# Patient Record
Sex: MALE | Race: WHITE | NOT HISPANIC OR LATINO | Employment: OTHER | URBAN - NONMETROPOLITAN AREA
[De-identification: names, ages, dates, MRNs, and addresses within clinical notes are randomized per-mention and may not be internally consistent; named-entity substitution may affect disease eponyms.]

---

## 2017-01-03 ENCOUNTER — DOCUMENTATION (OUTPATIENT)
Dept: GASTROENTEROLOGY | Facility: CLINIC | Age: 52
End: 2017-01-03

## 2017-01-03 NOTE — PROGRESS NOTES
01/03/2017, 1136 - Prior Authorization request received via facsimile per patient's pharmacy of choice, [place], [place], regarding Humira Pen Injector Kit 40 mg/0.8 ml, Inject one 40 mg pen subcutaneously every 14 days.  Patient's health insurance, [facility], contacted [phone].  Spoke with Representative, Veena.  Prior Authorization obtained with ending date of 08/01/2018.  Reference Number [id number].  Pharmacy notified.  Spoke with PharmacistEllie.

## 2017-02-16 ENCOUNTER — TELEPHONE (OUTPATIENT)
Dept: GASTROENTEROLOGY | Facility: CLINIC | Age: 52
End: 2017-02-16

## 2017-02-16 NOTE — TELEPHONE ENCOUNTER
02/16/2017, 1551 - Patient telephoned per this staff member with notification of need to complete [employer] Patient Assistance Application for Humira.  Paper application received via facsimile submitted to patient via mail at current address on file, [address] [place] [id number], with physician/clinical portion of application completed.  Patient states he had submitted application online Monday, February 13, 2017.

## 2017-02-20 ENCOUNTER — DOCUMENTATION (OUTPATIENT)
Dept: GASTROENTEROLOGY | Facility: CLINIC | Age: 52
End: 2017-02-20

## 2017-02-20 NOTE — PROGRESS NOTES
02/20/2017, Josef Veras with [employer] [facility] [employer] ([phone], extension [phone]) telephoned requesting a verbal order for patient, Jose Trent, 1965, prescription medication therapy regarding patient's diagnosis of Crohn's Disease with Complications Unspecified Gastrointestinal Tract Location (K50.919).  Spoke with Pharmacist, Romelia.  Verbal Order - Humira 40 mg / 0.8 ml, Inject one pen subcutaneously every 14 days, Dispense 3 kits (6 pens - 3 month supply), 3 refills.  Eda and Romelia notified that this staff member spoke with patient 02/16/2017 regarding need to complete assistance application received via facsimile.  Physician/clinical portion of assistance application completed per this staff member and submitted to patient via mail at current address on file - [address] [place] [address], [phone], with patient verbalizing understanding, and stating he had previously submitted assistance application on line Monday, February 13, 2017.

## 2017-02-22 ENCOUNTER — TELEPHONE (OUTPATIENT)
Dept: GASTROENTEROLOGY | Facility: CLINIC | Age: 52
End: 2017-02-22

## 2017-02-22 NOTE — TELEPHONE ENCOUNTER
02/22/2017, 1551 - Patient telephoned  [phone] with notification of approval of patient assistance application through 12/31/2017 per [employer] regarding Humira.  Patient verbalized understanding.

## 2017-03-08 ENCOUNTER — LAB (OUTPATIENT)
Dept: LAB | Facility: HOSPITAL | Age: 52
End: 2017-03-08

## 2017-03-08 ENCOUNTER — OFFICE VISIT (OUTPATIENT)
Dept: GASTROENTEROLOGY | Facility: CLINIC | Age: 52
End: 2017-03-08

## 2017-03-08 VITALS
HEART RATE: 71 BPM | DIASTOLIC BLOOD PRESSURE: 86 MMHG | BODY MASS INDEX: 26.81 KG/M2 | SYSTOLIC BLOOD PRESSURE: 135 MMHG | HEIGHT: 70 IN | WEIGHT: 187.3 LBS

## 2017-03-08 DIAGNOSIS — K52.9 IBD (INFLAMMATORY BOWEL DISEASE): Primary | ICD-10-CM

## 2017-03-08 DIAGNOSIS — E53.8 VITAMIN B12 DEFICIENCY: ICD-10-CM

## 2017-03-08 DIAGNOSIS — K50.819 CROHN'S DISEASE OF SMALL AND LARGE INTESTINES WITH COMPLICATION (HCC): ICD-10-CM

## 2017-03-08 DIAGNOSIS — K21.9 GASTROESOPHAGEAL REFLUX DISEASE WITHOUT ESOPHAGITIS: ICD-10-CM

## 2017-03-08 LAB
BASOPHILS # BLD AUTO: 0.05 10*3/MM3 (ref 0–0.2)
BASOPHILS NFR BLD AUTO: 0.5 % (ref 0–2)
DEPRECATED RDW RBC AUTO: 49.5 FL (ref 35.1–43.9)
EOSINOPHIL # BLD AUTO: 0.52 10*3/MM3 (ref 0–0.7)
EOSINOPHIL NFR BLD AUTO: 5.4 % (ref 0–7)
ERYTHROCYTE [DISTWIDTH] IN BLOOD BY AUTOMATED COUNT: 14.5 % (ref 11.5–14.5)
HCT VFR BLD AUTO: 43.1 % (ref 39–49)
HGB BLD-MCNC: 15.3 G/DL (ref 13.7–17.3)
IMM GRANULOCYTES # BLD: 0.01 10*3/MM3 (ref 0–0.02)
IMM GRANULOCYTES NFR BLD: 0.1 % (ref 0–0.5)
LYMPHOCYTES # BLD AUTO: 4.13 10*3/MM3 (ref 0.6–4.2)
LYMPHOCYTES NFR BLD AUTO: 42.6 % (ref 10–50)
MCH RBC QN AUTO: 33 PG (ref 26.5–34)
MCHC RBC AUTO-ENTMCNC: 35.5 G/DL (ref 31.5–36.3)
MCV RBC AUTO: 93.1 FL (ref 80–98)
MONOCYTES # BLD AUTO: 0.84 10*3/MM3 (ref 0–0.9)
MONOCYTES NFR BLD AUTO: 8.7 % (ref 0–12)
NEUTROPHILS # BLD AUTO: 4.14 10*3/MM3 (ref 2–8.6)
NEUTROPHILS NFR BLD AUTO: 42.7 % (ref 37–80)
PLATELET # BLD AUTO: 391 10*3/MM3 (ref 150–450)
PMV BLD AUTO: 10.4 FL (ref 8–12)
RBC # BLD AUTO: 4.63 10*6/MM3 (ref 4.37–5.74)
VIT B12 BLD-MCNC: 250 PG/ML (ref 239–931)
WBC NRBC COR # BLD: 9.69 10*3/MM3 (ref 3.2–9.8)

## 2017-03-08 PROCEDURE — 36415 COLL VENOUS BLD VENIPUNCTURE: CPT

## 2017-03-08 PROCEDURE — 82607 VITAMIN B-12: CPT

## 2017-03-08 PROCEDURE — 99213 OFFICE O/P EST LOW 20 MIN: CPT | Performed by: INTERNAL MEDICINE

## 2017-03-08 PROCEDURE — 85025 COMPLETE CBC W/AUTO DIFF WBC: CPT

## 2017-03-08 RX ORDER — PREDNISONE 1 MG/1
TABLET ORAL
Refills: 1 | COMMUNITY
Start: 2017-01-26 | End: 2017-06-21

## 2017-03-11 NOTE — PROGRESS NOTES
Chief Complaint   Patient presents with   • Crohn's Disease        Jose Trent is a  51 y.o. male here for a follow up visit for Crohn's disease    HPI :  The patient returns for follow-up.  Currently doing well on Humira.  Bowel movements are 5-6 times daily.  Rare nocturnal diarrhea.  Denies rectal bleeding.  Weight is stable  .  Reflux symptoms are controlled with omeprazole 20 mg 1-2 daily.  He continues to smoke in spite of advice to discontinue this.  Lab work is reviewed and appears satisfactory         Past Medical History   Diagnosis Date   • Backache      of indeterminate etiology   • Cobalamin deficiency    • Crohn's disease      With ileocolic stricture now resected   • Crohn's disease, small intestine    • Drug therapy      long term   • GERD (gastroesophageal reflux disease)    • Partial obstruction of small intestine    • Pharyngitis    • Portal hypertension    • Portal vein thrombosis      And splenic vein   • Rhinitis    • Tobacco dependence syndrome      encouraged to stop   • Upper respiratory infection        Current Outpatient Prescriptions   Medication Sig Dispense Refill   • adalimumab (HUMIRA) 40 MG/0.8ML Prefilled Syringe Kit injection Inject 40 mg under the skin every 14 (fourteen) days.     • aspirin 81 MG EC tablet Take 81 mg by mouth daily.     • omeprazole (PriLOSEC) 20 MG capsule Take 20 mg by mouth 2 (two) times a day.     • predniSONE (DELTASONE) 5 MG tablet   1     Current Facility-Administered Medications   Medication Dose Route Frequency Provider Last Rate Last Dose   • cyanocobalamin injection 500 mcg  500 mcg Intramuscular Q28 Days VEENA Starkey   500 mcg at 12/07/16 1046       PRN Meds:.    No Known Allergies    Social History     Social History   • Marital status:      Spouse name: N/A   • Number of children: N/A   • Years of education: N/A     Occupational History   • Not on file.     Social History Main Topics   • Smoking status: Current Every Day  Smoker     Packs/day: 1.00     Types: Cigarettes   • Smokeless tobacco: Never Used   • Alcohol use No   • Drug use: No   • Sexual activity: Defer     Other Topics Concern   • Not on file     Social History Narrative       Family History   Problem Relation Age of Onset   • Cancer Other        Review of Systems   Constitutional: Negative for activity change, chills, diaphoresis and unexpected weight change.   Cardiovascular: Negative for chest pain.   Gastrointestinal: Positive for diarrhea. Negative for abdominal distention, abdominal pain, anal bleeding, blood in stool, constipation, nausea, rectal pain and vomiting.   Musculoskeletal: Negative for arthralgias and myalgias.       Vitals:    03/08/17 1059   BP: 135/86   Pulse: 71       Physical Exam   Constitutional: He appears well-developed and well-nourished.   Cardiovascular: Normal rate, regular rhythm and normal heart sounds.  Exam reveals no gallop and no friction rub.    No murmur heard.  Pulmonary/Chest: Effort normal and breath sounds normal. No respiratory distress. He has no rales.   Abdominal: Soft. Normal appearance and bowel sounds are normal. He exhibits no distension and no ascites. There is no hepatosplenomegaly, splenomegaly or hepatomegaly. There is no tenderness. No hernia.   Nursing note and vitals reviewed.      ASSESSMENT AND PLAN      ICD-10-CM ICD-9-CM   1. IBD (inflammatory bowel disease) K63.89 558.9   2. Crohn's disease of small and large intestines with complication K50.819 555.2   3. Gastroesophageal reflux disease without esophagitis K21.9 530.81       Plan:  Continue current medical regimen  Return to clinic in 3 months with CBC chem 13 at that time          This document has been electronically signed by Ross Mejias MD on March 11, 2017 3:38 PM

## 2017-04-05 ENCOUNTER — DOCUMENTATION (OUTPATIENT)
Dept: GASTROENTEROLOGY | Facility: CLINIC | Age: 52
End: 2017-04-05

## 2017-04-05 DIAGNOSIS — K21.9 GASTROESOPHAGEAL REFLUX DISEASE WITHOUT ESOPHAGITIS: Primary | ICD-10-CM

## 2017-04-05 RX ORDER — OMEPRAZOLE 20 MG/1
CAPSULE, DELAYED RELEASE ORAL
Qty: 60 CAPSULE | Refills: 5 | Status: SHIPPED | OUTPATIENT
Start: 2017-04-05 | End: 2018-03-26 | Stop reason: SDUPTHER

## 2017-06-21 ENCOUNTER — LAB (OUTPATIENT)
Dept: LAB | Facility: HOSPITAL | Age: 52
End: 2017-06-21

## 2017-06-21 ENCOUNTER — OFFICE VISIT (OUTPATIENT)
Dept: GASTROENTEROLOGY | Facility: CLINIC | Age: 52
End: 2017-06-21

## 2017-06-21 VITALS
HEIGHT: 70 IN | WEIGHT: 181.1 LBS | BODY MASS INDEX: 25.93 KG/M2 | DIASTOLIC BLOOD PRESSURE: 77 MMHG | HEART RATE: 74 BPM | SYSTOLIC BLOOD PRESSURE: 116 MMHG

## 2017-06-21 DIAGNOSIS — K52.9 IBD (INFLAMMATORY BOWEL DISEASE): ICD-10-CM

## 2017-06-21 DIAGNOSIS — E53.8 VITAMIN B12 DEFICIENCY: ICD-10-CM

## 2017-06-21 DIAGNOSIS — K21.9 GASTROESOPHAGEAL REFLUX DISEASE WITHOUT ESOPHAGITIS: ICD-10-CM

## 2017-06-21 DIAGNOSIS — K50.019 CROHN'S DISEASE OF SMALL INTESTINE WITH COMPLICATION (HCC): Primary | ICD-10-CM

## 2017-06-21 LAB
ALBUMIN SERPL-MCNC: 4.1 G/DL (ref 3.4–4.8)
ALBUMIN/GLOB SERPL: 1.2 G/DL (ref 1.1–1.8)
ALP SERPL-CCNC: 87 U/L (ref 38–126)
ALT SERPL W P-5'-P-CCNC: 37 U/L (ref 21–72)
ANION GAP SERPL CALCULATED.3IONS-SCNC: 10 MMOL/L (ref 5–15)
AST SERPL-CCNC: 56 U/L (ref 17–59)
BASOPHILS # BLD AUTO: 0.03 10*3/MM3 (ref 0–0.2)
BASOPHILS NFR BLD AUTO: 0.3 % (ref 0–2)
BILIRUB SERPL-MCNC: 0.9 MG/DL (ref 0.2–1.3)
BUN BLD-MCNC: 9 MG/DL (ref 7–21)
BUN/CREAT SERPL: 9.3 (ref 7–25)
CALCIUM SPEC-SCNC: 9.1 MG/DL (ref 8.4–10.2)
CHLORIDE SERPL-SCNC: 103 MMOL/L (ref 95–110)
CO2 SERPL-SCNC: 27 MMOL/L (ref 22–31)
CREAT BLD-MCNC: 0.97 MG/DL (ref 0.7–1.3)
DEPRECATED RDW RBC AUTO: 49.2 FL (ref 35.1–43.9)
EOSINOPHIL # BLD AUTO: 0.45 10*3/MM3 (ref 0–0.7)
EOSINOPHIL NFR BLD AUTO: 4.8 % (ref 0–7)
ERYTHROCYTE [DISTWIDTH] IN BLOOD BY AUTOMATED COUNT: 14.6 % (ref 11.5–14.5)
GFR SERPL CREATININE-BSD FRML MDRD: 82 ML/MIN/1.73 (ref 56–130)
GLOBULIN UR ELPH-MCNC: 3.5 GM/DL (ref 2.3–3.5)
GLUCOSE BLD-MCNC: 106 MG/DL (ref 60–100)
HCT VFR BLD AUTO: 43 % (ref 39–49)
HGB BLD-MCNC: 15 G/DL (ref 13.7–17.3)
IMM GRANULOCYTES # BLD: 0.01 10*3/MM3 (ref 0–0.02)
IMM GRANULOCYTES NFR BLD: 0.1 % (ref 0–0.5)
LYMPHOCYTES # BLD AUTO: 3.33 10*3/MM3 (ref 0.6–4.2)
LYMPHOCYTES NFR BLD AUTO: 35.2 % (ref 10–50)
MCH RBC QN AUTO: 32.4 PG (ref 26.5–34)
MCHC RBC AUTO-ENTMCNC: 34.9 G/DL (ref 31.5–36.3)
MCV RBC AUTO: 92.9 FL (ref 80–98)
MONOCYTES # BLD AUTO: 1.01 10*3/MM3 (ref 0–0.9)
MONOCYTES NFR BLD AUTO: 10.7 % (ref 0–12)
NEUTROPHILS # BLD AUTO: 4.64 10*3/MM3 (ref 2–8.6)
NEUTROPHILS NFR BLD AUTO: 48.9 % (ref 37–80)
PLATELET # BLD AUTO: 401 10*3/MM3 (ref 150–450)
PMV BLD AUTO: 11.5 FL (ref 8–12)
POTASSIUM BLD-SCNC: 3.5 MMOL/L (ref 3.5–5.1)
PROT SERPL-MCNC: 7.6 G/DL (ref 6.3–8.6)
RBC # BLD AUTO: 4.63 10*6/MM3 (ref 4.37–5.74)
SODIUM BLD-SCNC: 140 MMOL/L (ref 137–145)
WBC NRBC COR # BLD: 9.47 10*3/MM3 (ref 3.2–9.8)

## 2017-06-21 PROCEDURE — 96372 THER/PROPH/DIAG INJ SC/IM: CPT | Performed by: INTERNAL MEDICINE

## 2017-06-21 PROCEDURE — 36415 COLL VENOUS BLD VENIPUNCTURE: CPT

## 2017-06-21 PROCEDURE — 99212 OFFICE O/P EST SF 10 MIN: CPT | Performed by: INTERNAL MEDICINE

## 2017-06-21 PROCEDURE — 80053 COMPREHEN METABOLIC PANEL: CPT | Performed by: INTERNAL MEDICINE

## 2017-06-21 PROCEDURE — 85025 COMPLETE CBC W/AUTO DIFF WBC: CPT | Performed by: INTERNAL MEDICINE

## 2017-06-21 RX ADMIN — CYANOCOBALAMIN 500 MCG: 1000 INJECTION, SOLUTION INTRAMUSCULAR; SUBCUTANEOUS at 09:35

## 2017-06-24 NOTE — PROGRESS NOTES
Chief Complaint   Patient presents with   • 3 Month Clinical Appointment   • Gastroesophageal Reflux Disease Without Esophagitis   • Irritable Bowel Disease   • Crohn's Disease Of Small And Large Intestine With Complicati        Jose Trent is a  51 y.o. male here for a follow up visit for Crohn's disease    HPI :  The patient returns for follow-up.  The patient was done rather well since his surgery in April of last year.  Bowel movements currently are 5-6 times daily.  Denies rectal bleeding.  Denies abdominal pain.    His weight is stable.  He continues to smoke in spite of advised to discontinue this.  He takes Prilosec 20 mg on a when necessary basis.  Presently denies heartburn.  No dysphagia.      Past Medical History:   Diagnosis Date   • Backache     of indeterminate etiology   • Cobalamin deficiency    • Crohn's disease     With ileocolic stricture now resected   • Crohn's disease, small intestine    • Drug therapy     long term   • GERD (gastroesophageal reflux disease)    • Partial obstruction of small intestine    • Pharyngitis    • Portal hypertension    • Portal vein thrombosis     And splenic vein   • Rhinitis    • Tobacco dependence syndrome     encouraged to stop   • Upper respiratory infection        Current Outpatient Prescriptions   Medication Sig Dispense Refill   • adalimumab (HUMIRA) 40 MG/0.8ML Prefilled Syringe Kit injection Inject 40 mg under the skin every 14 (fourteen) days.     • aspirin 81 MG EC tablet Take 81 mg by mouth daily.     • omeprazole (priLOSEC) 20 MG capsule 1 capsule by mouth 2 times per day 60 capsule 5     Current Facility-Administered Medications   Medication Dose Route Frequency Provider Last Rate Last Dose   • cyanocobalamin injection 500 mcg  500 mcg Intramuscular Q28 Days VEENA Starkey   500 mcg at 06/21/17 0935       PRN Meds:.    No Known Allergies    Social History     Social History   • Marital status:      Spouse name: N/A   • Number  of children: N/A   • Years of education: N/A     Occupational History   • Not on file.     Social History Main Topics   • Smoking status: Current Every Day Smoker     Packs/day: 1.00     Types: Cigarettes   • Smokeless tobacco: Never Used   • Alcohol use No   • Drug use: No   • Sexual activity: Defer      Comment:      Other Topics Concern   • Not on file     Social History Narrative       Family History   Problem Relation Age of Onset   • Cancer Other        Review of Systems   Constitutional: Negative for activity change, chills, diaphoresis and unexpected weight change.   Cardiovascular: Negative for chest pain.   Gastrointestinal: Positive for diarrhea. Negative for abdominal distention, abdominal pain, anal bleeding, blood in stool, constipation, nausea, rectal pain and vomiting.   Musculoskeletal: Negative for arthralgias and myalgias.       Vitals:    06/21/17 0851   BP: 116/77   Pulse: 74       Physical Exam    ASSESSMENT AND PLAN      ICD-10-CM ICD-9-CM   1. Crohn's disease of small intestine with complication K50.019 555.0   2. Vitamin B12 deficiency E53.8 266.2   3. Gastroesophageal reflux disease without esophagitis K21.9 530.81      Plan :  Vitamin B12 500 µg IM   Continue current medical regimen   Follow-up by gastroenterology in 6 months by Dr. Beavers          This document has been electronically signed by Ross Mejias MD on June 24, 2017 10:53 AM                                   “EMR Dragon/Transcription disclaimer:   Much of this encounter note is an electronic transcription/translation of spoken language to printed text. The electronic translation of spoken language may permit erroneous, or at times, nonsensical words or phrases to be inadvertently transcribed; Although I have reviewed the note for such errors, some may still exist.”

## 2017-09-18 DIAGNOSIS — E53.8 VITAMIN B12 DEFICIENCY: Primary | ICD-10-CM

## 2017-09-18 RX ORDER — CYANOCOBALAMIN 1000 UG/ML
500 INJECTION, SOLUTION INTRAMUSCULAR; SUBCUTANEOUS
Status: DISCONTINUED | OUTPATIENT
Start: 2017-09-18 | End: 2021-05-04

## 2017-12-19 ENCOUNTER — APPOINTMENT (OUTPATIENT)
Dept: LAB | Facility: HOSPITAL | Age: 52
End: 2017-12-19

## 2017-12-19 ENCOUNTER — OFFICE VISIT (OUTPATIENT)
Dept: GASTROENTEROLOGY | Facility: CLINIC | Age: 52
End: 2017-12-19

## 2017-12-19 VITALS
HEART RATE: 68 BPM | DIASTOLIC BLOOD PRESSURE: 72 MMHG | HEIGHT: 70 IN | SYSTOLIC BLOOD PRESSURE: 128 MMHG | WEIGHT: 186 LBS | BODY MASS INDEX: 26.63 KG/M2

## 2017-12-19 DIAGNOSIS — K50.819 CROHN'S DISEASE OF SMALL AND LARGE INTESTINES WITH COMPLICATION (HCC): Primary | ICD-10-CM

## 2017-12-19 LAB
ALBUMIN SERPL-MCNC: 4.3 G/DL (ref 3.4–4.8)
ALBUMIN/GLOB SERPL: 1.2 G/DL (ref 1.1–1.8)
ALP SERPL-CCNC: 70 U/L (ref 38–126)
ALT SERPL W P-5'-P-CCNC: 40 U/L (ref 21–72)
ANION GAP SERPL CALCULATED.3IONS-SCNC: 10 MMOL/L (ref 5–15)
AST SERPL-CCNC: 37 U/L (ref 17–59)
BASOPHILS # BLD AUTO: 0.05 10*3/MM3 (ref 0–0.2)
BASOPHILS NFR BLD AUTO: 0.5 % (ref 0–2)
BILIRUB SERPL-MCNC: 0.8 MG/DL (ref 0.2–1.3)
BUN BLD-MCNC: 7 MG/DL (ref 7–21)
BUN/CREAT SERPL: 7.3 (ref 7–25)
CALCIUM SPEC-SCNC: 9.1 MG/DL (ref 8.4–10.2)
CHLORIDE SERPL-SCNC: 102 MMOL/L (ref 95–110)
CO2 SERPL-SCNC: 26 MMOL/L (ref 22–31)
CREAT BLD-MCNC: 0.96 MG/DL (ref 0.7–1.3)
DEPRECATED RDW RBC AUTO: 49.2 FL (ref 35.1–43.9)
EOSINOPHIL # BLD AUTO: 0.48 10*3/MM3 (ref 0–0.7)
EOSINOPHIL NFR BLD AUTO: 4.7 % (ref 0–7)
ERYTHROCYTE [DISTWIDTH] IN BLOOD BY AUTOMATED COUNT: 14.6 % (ref 11.5–14.5)
GFR SERPL CREATININE-BSD FRML MDRD: 83 ML/MIN/1.73 (ref 56–130)
GLOBULIN UR ELPH-MCNC: 3.7 GM/DL (ref 2.3–3.5)
GLUCOSE BLD-MCNC: 98 MG/DL (ref 60–100)
HCT VFR BLD AUTO: 42.8 % (ref 39–49)
HGB BLD-MCNC: 14.9 G/DL (ref 13.7–17.3)
IMM GRANULOCYTES # BLD: 0.02 10*3/MM3 (ref 0–0.02)
IMM GRANULOCYTES NFR BLD: 0.2 % (ref 0–0.5)
LYMPHOCYTES # BLD AUTO: 3.76 10*3/MM3 (ref 0.6–4.2)
LYMPHOCYTES NFR BLD AUTO: 36.6 % (ref 10–50)
MCH RBC QN AUTO: 32 PG (ref 26.5–34)
MCHC RBC AUTO-ENTMCNC: 34.8 G/DL (ref 31.5–36.3)
MCV RBC AUTO: 92 FL (ref 80–98)
MONOCYTES # BLD AUTO: 1.13 10*3/MM3 (ref 0–0.9)
MONOCYTES NFR BLD AUTO: 11 % (ref 0–12)
NEUTROPHILS # BLD AUTO: 4.83 10*3/MM3 (ref 2–8.6)
NEUTROPHILS NFR BLD AUTO: 47 % (ref 37–80)
PLATELET # BLD AUTO: 373 10*3/MM3 (ref 150–450)
PMV BLD AUTO: 10.4 FL (ref 8–12)
POTASSIUM BLD-SCNC: 3.6 MMOL/L (ref 3.5–5.1)
PROT SERPL-MCNC: 8 G/DL (ref 6.3–8.6)
RBC # BLD AUTO: 4.65 10*6/MM3 (ref 4.37–5.74)
SODIUM BLD-SCNC: 138 MMOL/L (ref 137–145)
WBC NRBC COR # BLD: 10.27 10*3/MM3 (ref 3.2–9.8)

## 2017-12-19 PROCEDURE — 86480 TB TEST CELL IMMUN MEASURE: CPT | Performed by: NURSE PRACTITIONER

## 2017-12-19 PROCEDURE — 36415 COLL VENOUS BLD VENIPUNCTURE: CPT | Performed by: NURSE PRACTITIONER

## 2017-12-19 PROCEDURE — 99213 OFFICE O/P EST LOW 20 MIN: CPT | Performed by: NURSE PRACTITIONER

## 2017-12-19 PROCEDURE — 80053 COMPREHEN METABOLIC PANEL: CPT | Performed by: NURSE PRACTITIONER

## 2017-12-19 PROCEDURE — 85025 COMPLETE CBC W/AUTO DIFF WBC: CPT | Performed by: NURSE PRACTITIONER

## 2017-12-19 NOTE — PROGRESS NOTES
Chief Complaint   Patient presents with   • Crohn's Disease       Subjective    Jose Trent is a 52 y.o. male. he is here today for follow-up.    History of Present Illness  52-year-old male presents for follow-up regarding Crohn's disease.  He has been followed by Dr. Mejias since 1984.  He was diagnosed with Crohn's disease in 1982.  He has had multiple abdominal surgeries outlined below with most recent surgery 2016 he reports was due to stricturing due to scar tissue.  He has been on Humira for the last 2 years and states flares have been much less frequent with this.  His weight is improved today at 186 pounds from last visit in June of 181 pounds.  He reports a good appetite.  Currently on B12 injections per primary care provider in [place].  States his bowel movements are about the same 5-6 times per day described as loose with no melena or hematochezia.  His last colonoscopy 7/2/15  Also gastric esophageal reflux disease well controlled with omeprazole 20 mg per day.  He is smoker not interested in quitting at this time smoking cessation is recommended.  Plan; we'll recheck CBC and CMP, QuantiFERON Gold test.  Follow-up in 6 months and he'll return to office sooner if needed.       The following portions of the patient's history were reviewed and updated as appropriate:   Past Medical History:   Diagnosis Date   • Backache     of indeterminate etiology   • Cobalamin deficiency    • Crohn's disease     With ileocolic stricture now resected   • Crohn's disease, small intestine    • Drug therapy     long term   • GERD (gastroesophageal reflux disease)    • Partial obstruction of small intestine    • Pharyngitis    • Portal hypertension    • Portal vein thrombosis     And splenic vein   • Rhinitis    • Tobacco dependence syndrome     encouraged to stop   • Upper respiratory infection      Past Surgical History:   Procedure Laterality Date   • COLECTOMY PARTIAL / TOTAL      Resection of  segment of sigmoid colon, resection of 45 cm of ileum, right colon, with ileotransverse colon anastomosis; resection of segment of bladder, suprapubic cystostomy. 03/28/1984       • COLONOSCOPY  07/02/2015   • ENDOSCOPY      Prior resect of cecum & ascend colon found.[place] beyond strict has a normal appear.Stenosis of anastomosis, will not allow pass of scope.Linear ulcerat of anastomosis.Narrow strict & not long strict as reported by xray. Dilatation performed. 6/26/2014       • ENDOSCOPY      Normal hypopharynx, esophagus, dudoenum, symmetrical & patent pylorus. Hiatus hernia in GE junction. Mild non-erosive gastritis in antrum. Biopsy taken. 01/31/2013       • EXPLORATORY LAPAROTOMY      Exploratory laparotomy with adhesiolysis.Takedown of previous anastomosis.Resection of terminal ileum and proximal transverse colon w/a stapled CODY anastomosis between ileum and transverse colon. 03/07/2016       • SPLENECTOMY       Splenomegaly and hypersplenism 11/02/2007    • UPPER GASTROINTESTINAL ENDOSCOPY  01/31/2013     Family History   Problem Relation Age of Onset   • Cancer Other        Current Outpatient Prescriptions   Medication Sig Dispense Refill   • adalimumab (HUMIRA) 40 MG/0.8ML Prefilled Syringe Kit injection Inject 40 mg under the skin every 14 (fourteen) days.     • aspirin 81 MG EC tablet Take 81 mg by mouth daily.     • omeprazole (priLOSEC) 20 MG capsule 1 capsule by mouth 2 times per day 60 capsule 5     Current Facility-Administered Medications   Medication Dose Route Frequency Provider Last Rate Last Dose   • cyanocobalamin injection 500 mcg  500 mcg Intramuscular Q30 Days VEENA Starkey         No Known Allergies  Social History     Social History   • Marital status:      Spouse name: N/A   • Number of children: N/A   • Years of education: N/A     Social History Main Topics   • Smoking status: Current Every Day Smoker     Packs/day: 1.00     Types: Cigarettes   • Smokeless tobacco: Never  "Used   • Alcohol use No   • Drug use: No   • Sexual activity: Defer      Comment:      Other Topics Concern   • None     Social History Narrative       Review of Systems  Review of Systems   Constitutional: Negative for activity change, appetite change, chills, diaphoresis, fatigue, fever and unexpected weight change.   HENT: Negative for sore throat and trouble swallowing.    Respiratory: Negative for shortness of breath.    Gastrointestinal: Positive for diarrhea (5-6times per day ) and nausea (rare ). Negative for abdominal distention, abdominal pain, anal bleeding, blood in stool, constipation, rectal pain and vomiting.   Musculoskeletal: Negative for arthralgias.   Skin: Negative for pallor.   Neurological: Negative for light-headedness.        /72 (BP Location: Right arm, Patient Position: Sitting, Cuff Size: Adult)  Pulse 68  Ht 177.8 cm (70\")  Wt 84.4 kg (186 lb)  BMI 26.69 kg/m2    Objective    Physical Exam   Constitutional: He is oriented to person, place, and time. He appears well-developed and well-nourished. He is cooperative. No distress.   HENT:   Head: Normocephalic and atraumatic.   Neck: Normal range of motion. Neck supple. No thyromegaly present.   Cardiovascular: Normal rate, regular rhythm and normal heart sounds.    Pulmonary/Chest: Effort normal and breath sounds normal. He has no wheezes. He has no rhonchi. He has no rales.   Abdominal: Soft. Normal appearance and bowel sounds are normal. He exhibits no shifting dullness and no distension. There is no hepatosplenomegaly. There is tenderness in the right lower quadrant. There is no rigidity and no guarding. No hernia.   Lymphadenopathy:     He has no cervical adenopathy.   Neurological: He is alert and oriented to person, place, and time.   Skin: Skin is warm, dry and intact. No rash noted. No pallor.   Psychiatric: He has a normal mood and affect. His speech is normal.     Lab on 06/21/2017   Component Date Value Ref Range " Status   • Glucose 06/21/2017 106* 60 - 100 mg/dL Final   • BUN 06/21/2017 9  7 - 21 mg/dL Final   • Creatinine 06/21/2017 0.97  0.70 - 1.30 mg/dL Final   • Sodium 06/21/2017 140  137 - 145 mmol/L Final   • Potassium 06/21/2017 3.5  3.5 - 5.1 mmol/L Final   • Chloride 06/21/2017 103  95 - 110 mmol/L Final   • CO2 06/21/2017 27.0  22.0 - 31.0 mmol/L Final   • Calcium 06/21/2017 9.1  8.4 - 10.2 mg/dL Final   • Total Protein 06/21/2017 7.6  6.3 - 8.6 g/dL Final   • Albumin 06/21/2017 4.10  3.40 - 4.80 g/dL Final   • ALT (SGPT) 06/21/2017 37  21 - 72 U/L Final   • AST (SGOT) 06/21/2017 56  17 - 59 U/L Final   • Alkaline Phosphatase 06/21/2017 87  38 - 126 U/L Final   • Total Bilirubin 06/21/2017 0.9  0.2 - 1.3 mg/dL Final   • eGFR Non African Amer 06/21/2017 82  56 - 130 mL/min/1.73 Final   • Globulin 06/21/2017 3.5  2.3 - 3.5 gm/dL Final   • A/G Ratio 06/21/2017 1.2  1.1 - 1.8 g/dL Final   • BUN/Creatinine Ratio 06/21/2017 9.3  7.0 - 25.0 Final   • Anion Gap 06/21/2017 10.0  5.0 - 15.0 mmol/L Final   • WBC 06/21/2017 9.47  3.20 - 9.80 10*3/mm3 Final   • RBC 06/21/2017 4.63  4.37 - 5.74 10*6/mm3 Final   • Hemoglobin 06/21/2017 15.0  13.7 - 17.3 g/dL Final   • Hematocrit 06/21/2017 43.0  39.0 - 49.0 % Final   • MCV 06/21/2017 92.9  80.0 - 98.0 fL Final   • MCH 06/21/2017 32.4  26.5 - 34.0 pg Final   • MCHC 06/21/2017 34.9  31.5 - 36.3 g/dL Final   • RDW 06/21/2017 14.6* 11.5 - 14.5 % Final   • RDW-SD 06/21/2017 49.2* 35.1 - 43.9 fl Final   • MPV 06/21/2017 11.5  8.0 - 12.0 fL Final   • Platelets 06/21/2017 401  150 - 450 10*3/mm3 Final   • Neutrophil % 06/21/2017 48.9  37.0 - 80.0 % Final   • Lymphocyte % 06/21/2017 35.2  10.0 - 50.0 % Final   • Monocyte % 06/21/2017 10.7  0.0 - 12.0 % Final   • Eosinophil % 06/21/2017 4.8  0.0 - 7.0 % Final   • Basophil % 06/21/2017 0.3  0.0 - 2.0 % Final   • Immature Grans % 06/21/2017 0.1  0.0 - 0.5 % Final   • Neutrophils, Absolute 06/21/2017 4.64  2.00 - 8.60 10*3/mm3 Final   •  Lymphocytes, Absolute 06/21/2017 3.33  0.60 - 4.20 10*3/mm3 Final   • Monocytes, Absolute 06/21/2017 1.01* 0.00 - 0.90 10*3/mm3 Final   • Eosinophils, Absolute 06/21/2017 0.45  0.00 - 0.70 10*3/mm3 Final   • Basophils, Absolute 06/21/2017 0.03  0.00 - 0.20 10*3/mm3 Final   • Immature Grans, Absolute 06/21/2017 0.01  0.00 - 0.02 10*3/mm3 Final     Assessment/Plan      1. Crohn's disease of small and large intestines with complication    .       Orders placed during this encounter include:  Orders Placed This Encounter   Procedures   • Comprehensive Metabolic Panel   • QuantiFERON TB Gold   • CBC & Differential     Order Specific Question:   Manual Differential     Answer:   No       * Surgery not found *    Review and/or summary of lab tests, radiology, procedures, medications. Review and summary of old records and obtaining of history. The risks and benefits of my recommendations, as well as other treatment options were discussed with the patient today. Questions were answered.    No orders of the defined types were placed in this encounter.      Follow-up: Return in about 6 months (around 6/19/2018).          This document has been electronically signed by VEENA Cantu on December 19, 2017 10:44 AM             Results for orders placed or performed in visit on 06/21/17   CBC Auto Differential   Result Value Ref Range    WBC 9.47 3.20 - 9.80 10*3/mm3    RBC 4.63 4.37 - 5.74 10*6/mm3    Hemoglobin 15.0 13.7 - 17.3 g/dL    Hematocrit 43.0 39.0 - 49.0 %    MCV 92.9 80.0 - 98.0 fL    MCH 32.4 26.5 - 34.0 pg    MCHC 34.9 31.5 - 36.3 g/dL    RDW 14.6 (H) 11.5 - 14.5 %    RDW-SD 49.2 (H) 35.1 - 43.9 fl    MPV 11.5 8.0 - 12.0 fL    Platelets 401 150 - 450 10*3/mm3    Neutrophil % 48.9 37.0 - 80.0 %    Lymphocyte % 35.2 10.0 - 50.0 %    Monocyte % 10.7 0.0 - 12.0 %    Eosinophil % 4.8 0.0 - 7.0 %    Basophil % 0.3 0.0 - 2.0 %    Immature Grans % 0.1 0.0 - 0.5 %    Neutrophils, Absolute 4.64 2.00 - 8.60 10*3/mm3     Lymphocytes, Absolute 3.33 0.60 - 4.20 10*3/mm3    Monocytes, Absolute 1.01 (H) 0.00 - 0.90 10*3/mm3    Eosinophils, Absolute 0.45 0.00 - 0.70 10*3/mm3    Basophils, Absolute 0.03 0.00 - 0.20 10*3/mm3    Immature Grans, Absolute 0.01 0.00 - 0.02 10*3/mm3   Comprehensive Metabolic Panel   Result Value Ref Range    Glucose 106 (H) 60 - 100 mg/dL    BUN 9 7 - 21 mg/dL    Creatinine 0.97 0.70 - 1.30 mg/dL    Sodium 140 137 - 145 mmol/L    Potassium 3.5 3.5 - 5.1 mmol/L    Chloride 103 95 - 110 mmol/L    CO2 27.0 22.0 - 31.0 mmol/L    Calcium 9.1 8.4 - 10.2 mg/dL    Total Protein 7.6 6.3 - 8.6 g/dL    Albumin 4.10 3.40 - 4.80 g/dL    ALT (SGPT) 37 21 - 72 U/L    AST (SGOT) 56 17 - 59 U/L    Alkaline Phosphatase 87 38 - 126 U/L    Total Bilirubin 0.9 0.2 - 1.3 mg/dL    eGFR Non  Amer 82 56 - 130 mL/min/1.73    Globulin 3.5 2.3 - 3.5 gm/dL    A/G Ratio 1.2 1.1 - 1.8 g/dL    BUN/Creatinine Ratio 9.3 7.0 - 25.0    Anion Gap 10.0 5.0 - 15.0 mmol/L   Results for orders placed or performed in visit on 03/08/17   Vitamin B12   Result Value Ref Range    Vitamin B-12 250 239 - 931 pg/mL   Results for orders placed or performed in visit on 03/08/17   CBC Auto Differential   Result Value Ref Range    WBC 9.69 3.20 - 9.80 10*3/mm3    RBC 4.63 4.37 - 5.74 10*6/mm3    Hemoglobin 15.3 13.7 - 17.3 g/dL    Hematocrit 43.1 39.0 - 49.0 %    MCV 93.1 80.0 - 98.0 fL    MCH 33.0 26.5 - 34.0 pg    MCHC 35.5 31.5 - 36.3 g/dL    RDW 14.5 11.5 - 14.5 %    RDW-SD 49.5 (H) 35.1 - 43.9 fl    MPV 10.4 8.0 - 12.0 fL    Platelets 391 150 - 450 10*3/mm3    Neutrophil % 42.7 37.0 - 80.0 %    Lymphocyte % 42.6 10.0 - 50.0 %    Monocyte % 8.7 0.0 - 12.0 %    Eosinophil % 5.4 0.0 - 7.0 %    Basophil % 0.5 0.0 - 2.0 %    Immature Grans % 0.1 0.0 - 0.5 %    Neutrophils, Absolute 4.14 2.00 - 8.60 10*3/mm3    Lymphocytes, Absolute 4.13 0.60 - 4.20 10*3/mm3    Monocytes, Absolute 0.84 0.00 - 0.90 10*3/mm3    Eosinophils, Absolute 0.52 0.00 - 0.70 10*3/mm3     Basophils, Absolute 0.05 0.00 - 0.20 10*3/mm3    Immature Grans, Absolute 0.01 0.00 - 0.02 10*3/mm3   Results for orders placed or performed during the hospital encounter of 12/07/16   CBC & Differential   Result Value Ref Range    WBC 11.1 (H) 3.2 - 9.8 x1000/uL    RBC 4.58 4.37 - 5.74 hermelinda/mm3    Hemoglobin 14.8 13.7 - 17.3 gm/dl    Hematocrit 42.8 39.0 - 49.0 %    MCV 93.4 80.0 - 98.0 fl    MCH 32.3 26.0 - 34.0 pg    MCHC 34.6 31.5 - 36.3 gm/dl    RDW 15.5 (H) 11.5 - 14.5 %    Platelets 445 150 - 450 x1000/mm3    MPV 9.8 8.0 - 12.0 fl    Neutrophil Rel % 40.1 37.0 - 80.0 %    Lymphocyte Rel % 43.9 10.0 - 50.0 %    Monocyte Rel % 10.3 0.0 - 12.0 %    Eosinophil Rel % 4.9 0.0 - 7.0 %    Basophil Rel % 0.6 0.0 - 2.0 %    Immature Granulocyte Rel % 0.20 0.00 - 0.50 %    Neutrophils Absolute 4.45 2.00 - 8.60 x1000/uL    Lymphocytes Absolute 4.89 (H) 0.60 - 4.20 x1000/uL    Monocytes Absolute 1.15 (H) 0.00 - 0.90 x1000/uL    Eosinophils Absolute 0.55 0.00 - 0.70 x1000/uL    Basophils Absolute 0.07 0.00 - 0.20 x1000/uL    Immature Granulocytes Absolute 0.020 0.005 - 0.022 x1000/uL    nRBC 0.0 0.0 - 0.2 %    nRBC 0.000 x1000/uL   Results for orders placed or performed during the hospital encounter of 08/24/16   CBC and Differential   Result Value Ref Range    WBC 16.5 (H) 3.2 - 9.8 x1000/uL    RBC 4.58 4.37 - 5.74 hermelinda/mm3    Hemoglobin 14.5 13.7 - 17.3 gm/dl    Hematocrit 41.2 39.0 - 49.0 %    MCV 90.0 80.0 - 98.0 fl    MCH 31.7 26.0 - 34.0 pg    MCHC 35.2 31.5 - 36.3 gm/dl    RDW 17.2 (H) 11.5 - 14.5 %    Platelets 397 150 - 450 x1000/mm3    MPV 10.0 8.0 - 12.0 fl    Neutrophil Rel % 60.5 37.0 - 80.0 %    Lymphocyte Rel % 28.7 10.0 - 50.0 %    Monocyte Rel % 6.8 0.0 - 12.0 %    Eosinophil Rel % 3.5 0.0 - 7.0 %    Basophil Rel % 0.2 0.0 - 2.0 %    Immature Granulocyte Rel % 0.30 0.00 - 0.50 %    Neutrophils Absolute 9.96 (H) 2.00 - 8.60 x1000/uL    Lymphocytes Absolute 4.73 (H) 0.60 - 4.20 x1000/uL    Monocytes  Absolute 1.12 (H) 0.00 - 0.90 x1000/uL    Eosinophils Absolute 0.58 0.00 - 0.70 x1000/uL    Basophils Absolute 0.04 0.00 - 0.20 x1000/uL    Immature Granulocytes Absolute 0.050 (H) 0.005 - 0.022 x1000/uL     *Note: Due to a large number of results and/or encounters for the requested time period, some results have not been displayed. A complete set of results can be found in Results Review.

## 2017-12-21 LAB
INTERPRETATION: NORMAL
M TB TUBERC IFN-G BLD QL: NEGATIVE
QFT TB AG MINUS NIL VALUE: 0 IU/ML
QUANTIFERON CRITERIA: NORMAL
QUANTIFERON INCUBATION: NORMAL
QUANTIFERON MITOGEN VALUE: >10 IU/ML
QUANTIFERON NIL VALUE: 0.03 IU/ML
QUANTIFERON TB AG VALUE: 0.03 IU/ML

## 2018-03-26 DIAGNOSIS — K21.9 GASTROESOPHAGEAL REFLUX DISEASE WITHOUT ESOPHAGITIS: ICD-10-CM

## 2018-03-26 RX ORDER — OMEPRAZOLE 20 MG/1
CAPSULE, DELAYED RELEASE ORAL
Qty: 60 CAPSULE | Refills: 5 | Status: SHIPPED | OUTPATIENT
Start: 2018-03-26 | End: 2019-01-18 | Stop reason: SDUPTHER

## 2018-04-03 ENCOUNTER — TELEPHONE (OUTPATIENT)
Dept: GASTROENTEROLOGY | Facility: CLINIC | Age: 53
End: 2018-04-03

## 2018-04-03 NOTE — TELEPHONE ENCOUNTER
----- Message from Nella Hyman sent at 4/2/2018 12:50 PM CDT -----      ----- Message -----  From: VEENA Starkey  Sent: 4/2/2018  12:27 PM  To: Nella Hyman    We sent it for Pens. Pharmacy should contact us if something like that incorrect? Will you ask Margy to call and get form for resubmission?  ----- Message -----  From: Nella Hyman  Sent: 4/2/2018  10:10 AM  To: VEENA Starkey    Stated script sent for humira was for wrong thing, suppose to be for pens. Stated a fax had been sent last Friday requesting the change

## 2018-04-03 NOTE — TELEPHONE ENCOUNTER
04/03/2018, [facility] telephoned per this staff member [phone], Option 4, regarding patient request for correction to Humira 40 MG/0.8 ML prescription medication therapy for Crohn's Disease of Small and Large Intestine with Complications.  Verbal order submitted for Humira 40 MG/0.8 ML Prefilled Pens (NOT Syringes), Inject 40 MG subcutaneously every 14 days, dispense 2, with 11 renewals.

## 2018-04-03 NOTE — TELEPHONE ENCOUNTER
04/03/2018, 1321 - Patient telephoned per this staff member [phone].  Zero answer.  Voice message submitted with date, time, office contact information, and request to contact office at earliest convenience for clarification regarding patient's previous telephone message submitted 04/02/2018 regarding Humira.

## 2018-08-15 ENCOUNTER — TELEPHONE (OUTPATIENT)
Dept: GASTROENTEROLOGY | Facility: CLINIC | Age: 53
End: 2018-08-15

## 2018-08-15 NOTE — TELEPHONE ENCOUNTER
"08/15/2018, 1353 - Patient telephoned per this staff member [phone] with notification facsimile received per [employer] Patient Assistance Program regarding prescription medication Humira stating \"patient continues to meet program eligibility with confirmed enrollment in program through December 31, 2018\".  Patient verbalized understanding.   "

## 2018-08-28 ENCOUNTER — OFFICE VISIT (OUTPATIENT)
Dept: GASTROENTEROLOGY | Facility: CLINIC | Age: 53
End: 2018-08-28

## 2018-08-28 VITALS
HEIGHT: 70 IN | BODY MASS INDEX: 26.83 KG/M2 | DIASTOLIC BLOOD PRESSURE: 82 MMHG | HEART RATE: 68 BPM | SYSTOLIC BLOOD PRESSURE: 118 MMHG | WEIGHT: 187.4 LBS | OXYGEN SATURATION: 98 %

## 2018-08-28 DIAGNOSIS — K50.819 CROHN'S DISEASE OF SMALL AND LARGE INTESTINES WITH COMPLICATION (HCC): ICD-10-CM

## 2018-08-28 DIAGNOSIS — Z12.11 ENCOUNTER FOR SCREENING COLONOSCOPY: Primary | ICD-10-CM

## 2018-08-28 PROCEDURE — 99213 OFFICE O/P EST LOW 20 MIN: CPT | Performed by: NURSE PRACTITIONER

## 2018-08-28 RX ORDER — SODIUM, POTASSIUM,MAG SULFATES 17.5-3.13G
1 SOLUTION, RECONSTITUTED, ORAL ORAL EVERY 12 HOURS
Qty: 2 BOTTLE | Refills: 0 | Status: SHIPPED | OUTPATIENT
Start: 2018-08-28 | End: 2019-02-18

## 2018-08-28 RX ORDER — DEXTROSE AND SODIUM CHLORIDE 5; .45 G/100ML; G/100ML
30 INJECTION, SOLUTION INTRAVENOUS CONTINUOUS PRN
Status: CANCELLED | OUTPATIENT
Start: 2019-02-22

## 2018-08-28 NOTE — PROGRESS NOTES
Chief Complaint   Patient presents with   • Crohn's Disease       Subjective    Jose Trent is a 52 y.o. male. he is here today for follow-up.    History of Present Illness  52-year-old male presents for follow-up regarding Crohn's disease.  He is been on Humira for the last 2 years and done very well with this denies any flares in that time.  Lab work was checked at last office visit which was unremarkable.  His last colonoscopy was 7/2/15. 's weight has remained stable.  Bowel movements are about the same 5-6 times per day described as loose however no melena or hematochezia.  His most recent colon surgery was in 2016 due to stricturing secondary to scar tissue.  History of esophageal reflux disease well controlled with omeprazole daily does not need refills at this time.  Generally avoids gastric irritants denies any breakthrough symptoms.  Plan; continue patient on current regimen of Humira for Crohn's disease and omeprazole for GERD.  We'll schedule patient for screening colonoscopy as it is been 3 years since colonoscopy.  Follow-up after test return to office sooner if needed.     The following portions of the patient's history were reviewed and updated as appropriate:   Past Medical History:   Diagnosis Date   • Backache     of indeterminate etiology   • Cobalamin deficiency    • Crohn's disease (CMS/HCC)     With ileocolic stricture now resected   • Crohn's disease, small intestine (CMS/HCC)    • Drug therapy     long term   • GERD (gastroesophageal reflux disease)    • Partial obstruction of small intestine    • Pharyngitis    • Portal hypertension (CMS/HCC)    • Portal vein thrombosis     And splenic vein   • Rhinitis    • Tobacco dependence syndrome     encouraged to stop   • Upper respiratory infection      Past Surgical History:   Procedure Laterality Date   • COLECTOMY PARTIAL / TOTAL      Resection of segment of sigmoid colon, resection of 45 cm of ileum, right colon, with  ileotransverse colon anastomosis; resection of segment of bladder, suprapubic cystostomy. 03/28/1984       • COLONOSCOPY  07/02/2015   • ENDOSCOPY      Prior resect of cecum & ascend colon found.[place] beyond strict has a normal appear.Stenosis of anastomosis, will not allow pass of scope.Linear ulcerat of anastomosis.Narrow strict & not long strict as reported by xray. Dilatation performed. 6/26/2014       • ENDOSCOPY      Normal hypopharynx, esophagus, dudoenum, symmetrical & patent pylorus. Hiatus hernia in GE junction. Mild non-erosive gastritis in antrum. Biopsy taken. 01/31/2013       • EXPLORATORY LAPAROTOMY      Exploratory laparotomy with adhesiolysis.Takedown of previous anastomosis.Resection of terminal ileum and proximal transverse colon w/a stapled CODY anastomosis between ileum and transverse colon. 03/07/2016       • SPLENECTOMY       Splenomegaly and hypersplenism 11/02/2007    • UPPER GASTROINTESTINAL ENDOSCOPY  01/31/2013     Family History   Problem Relation Age of Onset   • Cancer Other        Current Outpatient Prescriptions   Medication Sig Dispense Refill   • adalimumab (HUMIRA) 40 MG/0.8ML Prefilled Syringe Kit injection Inject 40 mg under the skin every 14 (fourteen) days.     • aspirin 81 MG EC tablet Take 81 mg by mouth daily.     • omeprazole (priLOSEC) 20 MG capsule 1 capsule by mouth 2 times per day 60 capsule 5   • sodium-potassium-magnesium sulfates (SUPREP BOWEL PREP KIT) 17.5-3.13-1.6 GM/180ML solution oral solution Take 1 bottle by mouth Every 12 (Twelve) Hours. 2 bottle 0     Current Facility-Administered Medications   Medication Dose Route Frequency Provider Last Rate Last Dose   • cyanocobalamin injection 500 mcg  500 mcg Intramuscular Q30 Days Torrie Paul APRN         No Known Allergies  Social History     Social History   • Marital status:      Social History Main Topics   • Smoking status: Current Every Day Smoker     Packs/day: 1.00     Types: Cigarettes   •  "Smokeless tobacco: Never Used   • Alcohol use No   • Drug use: No   • Sexual activity: Defer      Comment:      Other Topics Concern   • Not on file       Review of Systems  Review of Systems   Constitutional: Negative for activity change, appetite change, chills, diaphoresis, fatigue, fever and unexpected weight change.   HENT: Negative for sore throat and trouble swallowing.    Respiratory: Negative for shortness of breath.    Gastrointestinal: Positive for diarrhea (5-6 per day ). Negative for abdominal distention, abdominal pain, anal bleeding, blood in stool, constipation, nausea, rectal pain and vomiting.   Musculoskeletal: Negative for arthralgias.   Skin: Negative for pallor.   Neurological: Negative for light-headedness.        /82   Pulse 68   Ht 177.8 cm (70\")   Wt 85 kg (187 lb 6.4 oz)   SpO2 98%   BMI 26.89 kg/m²     Objective    Physical Exam   Constitutional: He is oriented to person, place, and time. He appears well-developed and well-nourished. He is cooperative. No distress.   HENT:   Head: Normocephalic and atraumatic.   Neck: Normal range of motion. Neck supple. No thyromegaly present.   Cardiovascular: Normal rate, regular rhythm and normal heart sounds.    Pulmonary/Chest: Effort normal and breath sounds normal. He has no wheezes. He has no rhonchi. He has no rales.   Abdominal: Soft. Normal appearance and bowel sounds are normal. He exhibits no distension. There is no hepatosplenomegaly. There is tenderness (states his normal ). There is no rigidity and no guarding. No hernia.   Lymphadenopathy:     He has no cervical adenopathy.   Neurological: He is alert and oriented to person, place, and time.   Skin: Skin is warm, dry and intact. No rash noted. No pallor.   Psychiatric: He has a normal mood and affect. His speech is normal.     Office Visit on 12/19/2017   Component Date Value Ref Range Status   • Glucose 12/19/2017 98  60 - 100 mg/dL Final   • BUN 12/19/2017 7  7 - 21 " mg/dL Final   • Creatinine 12/19/2017 0.96  0.70 - 1.30 mg/dL Final   • Sodium 12/19/2017 138  137 - 145 mmol/L Final   • Potassium 12/19/2017 3.6  3.5 - 5.1 mmol/L Final   • Chloride 12/19/2017 102  95 - 110 mmol/L Final   • CO2 12/19/2017 26.0  22.0 - 31.0 mmol/L Final   • Calcium 12/19/2017 9.1  8.4 - 10.2 mg/dL Final   • Total Protein 12/19/2017 8.0  6.3 - 8.6 g/dL Final   • Albumin 12/19/2017 4.30  3.40 - 4.80 g/dL Final   • ALT (SGPT) 12/19/2017 40  21 - 72 U/L Final   • AST (SGOT) 12/19/2017 37  17 - 59 U/L Final   • Alkaline Phosphatase 12/19/2017 70  38 - 126 U/L Final   • Total Bilirubin 12/19/2017 0.8  0.2 - 1.3 mg/dL Final   • eGFR Non African Amer 12/19/2017 83  56 - 130 mL/min/1.73 Final   • Globulin 12/19/2017 3.7* 2.3 - 3.5 gm/dL Final   • A/G Ratio 12/19/2017 1.2  1.1 - 1.8 g/dL Final   • BUN/Creatinine Ratio 12/19/2017 7.3  7.0 - 25.0 Final   • Anion Gap 12/19/2017 10.0  5.0 - 15.0 mmol/L Final   • QuantiFERON Incubation 12/19/2017 Comment   Final    Incubated, testing to follow.   • WBC 12/19/2017 10.27* 3.20 - 9.80 10*3/mm3 Final   • RBC 12/19/2017 4.65  4.37 - 5.74 10*6/mm3 Final   • Hemoglobin 12/19/2017 14.9  13.7 - 17.3 g/dL Final   • Hematocrit 12/19/2017 42.8  39.0 - 49.0 % Final   • MCV 12/19/2017 92.0  80.0 - 98.0 fL Final   • MCH 12/19/2017 32.0  26.5 - 34.0 pg Final   • MCHC 12/19/2017 34.8  31.5 - 36.3 g/dL Final   • RDW 12/19/2017 14.6* 11.5 - 14.5 % Final   • RDW-SD 12/19/2017 49.2* 35.1 - 43.9 fl Final   • MPV 12/19/2017 10.4  8.0 - 12.0 fL Final   • Platelets 12/19/2017 373  150 - 450 10*3/mm3 Final   • Neutrophil % 12/19/2017 47.0  37.0 - 80.0 % Final   • Lymphocyte % 12/19/2017 36.6  10.0 - 50.0 % Final   • Monocyte % 12/19/2017 11.0  0.0 - 12.0 % Final   • Eosinophil % 12/19/2017 4.7  0.0 - 7.0 % Final   • Basophil % 12/19/2017 0.5  0.0 - 2.0 % Final   • Immature Grans % 12/19/2017 0.2  0.0 - 0.5 % Final   • Neutrophils, Absolute 12/19/2017 4.83  2.00 - 8.60 10*3/mm3 Final   •  Lymphocytes, Absolute 12/19/2017 3.76  0.60 - 4.20 10*3/mm3 Final   • Monocytes, Absolute 12/19/2017 1.13* 0.00 - 0.90 10*3/mm3 Final   • Eosinophils, Absolute 12/19/2017 0.48  0.00 - 0.70 10*3/mm3 Final   • Basophils, Absolute 12/19/2017 0.05  0.00 - 0.20 10*3/mm3 Final   • Immature Grans, Absolute 12/19/2017 0.02  0.00 - 0.02 10*3/mm3 Final   • QuantiFERON-TB Gold In Tube 12/19/2017 Negative  Negative Final   • QuantiFERON Criteria 12/19/2017 Comment   Final    To be considered positive a specimen should have a TB Ag minus Nil  value greater than or equal to 0.35 IU/mL and in addition the TB Ag  minus Nil value must be greater than or equal to 25% of the Nil  value. There may be insufficient information in these values to  differentiate between some negative and some indeterminate test  values.   • QuantiFERON TB Ag Value 12/19/2017 0.03  IU/mL Final   • QuantiFERON Nil Value 12/19/2017 0.03  IU/mL Final   • QuantiFERON Mitogen Value 12/19/2017 >10.00  IU/mL Final   • QFT TB Ag minus Nil Value 12/19/2017 0.00  IU/mL Final   • Interpretation 12/19/2017 Comment   Final    The QuantiFERON TB Gold (in Tube) assay is intended for use as an aid  in the diagnosis of TB infection. Negative results suggest that there  is no TB infection. In patients with high suspicion of exposure, a  negative test should be repeated. A positive test indicates infection  with Mycobacterium tuberculosis. Among individuals without  tuberculosis infection, a positive test may be due to exposure to  M. kansasii, M. szulgai or M. marinum. On the Internet, go to  cdc.gov/tb for further details.     Assessment/Plan      1. Encounter for screening colonoscopy    2. Crohn's disease of small and large intestines with complication (CMS/HCC)    .       Orders placed during this encounter include:  Orders Placed This Encounter   Procedures   • Follow Anesthesia Guidelines / Standing Orders     Standing Status:   Future       COLONOSCOPY (N/A)    Review  and/or summary of lab tests, radiology, procedures, medications. Review and summary of old records and obtaining of history. The risks and benefits of my recommendations, as well as other treatment options were discussed with the patient today. Questions were answered.    New Medications Ordered This Visit   Medications   • sodium-potassium-magnesium sulfates (SUPREP BOWEL PREP KIT) 17.5-3.13-1.6 GM/180ML solution oral solution     Sig: Take 1 bottle by mouth Every 12 (Twelve) Hours.     Dispense:  2 bottle     Refill:  0       Follow-up: Return in about 3 months (around 11/28/2018).          This document has been electronically signed by VEENA Cantu on August 28, 2018 9:57 AM             Results for orders placed or performed in visit on 12/19/17   Reflexed QuantiFERON In   Result Value Ref Range    QuantiFERON-TB Gold In Tube Negative Negative    QuantiFERON Criteria Comment     QuantiFERON TB Ag Value 0.03 IU/mL    QuantiFERON Nil Value 0.03 IU/mL    QuantiFERON Mitogen Value >10.00 IU/mL    QFT TB Ag minus Nil Value 0.00 IU/mL    Interpretation Comment    QuantiFERON TB Gold   Result Value Ref Range    QuantiFERON Incubation Comment    CBC Auto Differential   Result Value Ref Range    WBC 10.27 (H) 3.20 - 9.80 10*3/mm3    RBC 4.65 4.37 - 5.74 10*6/mm3    Hemoglobin 14.9 13.7 - 17.3 g/dL    Hematocrit 42.8 39.0 - 49.0 %    MCV 92.0 80.0 - 98.0 fL    MCH 32.0 26.5 - 34.0 pg    MCHC 34.8 31.5 - 36.3 g/dL    RDW 14.6 (H) 11.5 - 14.5 %    RDW-SD 49.2 (H) 35.1 - 43.9 fl    MPV 10.4 8.0 - 12.0 fL    Platelets 373 150 - 450 10*3/mm3    Neutrophil % 47.0 37.0 - 80.0 %    Lymphocyte % 36.6 10.0 - 50.0 %    Monocyte % 11.0 0.0 - 12.0 %    Eosinophil % 4.7 0.0 - 7.0 %    Basophil % 0.5 0.0 - 2.0 %    Immature Grans % 0.2 0.0 - 0.5 %    Neutrophils, Absolute 4.83 2.00 - 8.60 10*3/mm3    Lymphocytes, Absolute 3.76 0.60 - 4.20 10*3/mm3    Monocytes, Absolute 1.13 (H) 0.00 - 0.90 10*3/mm3    Eosinophils, Absolute 0.48  0.00 - 0.70 10*3/mm3    Basophils, Absolute 0.05 0.00 - 0.20 10*3/mm3    Immature Grans, Absolute 0.02 0.00 - 0.02 10*3/mm3   Comprehensive Metabolic Panel   Result Value Ref Range    Glucose 98 60 - 100 mg/dL    BUN 7 7 - 21 mg/dL    Creatinine 0.96 0.70 - 1.30 mg/dL    Sodium 138 137 - 145 mmol/L    Potassium 3.6 3.5 - 5.1 mmol/L    Chloride 102 95 - 110 mmol/L    CO2 26.0 22.0 - 31.0 mmol/L    Calcium 9.1 8.4 - 10.2 mg/dL    Total Protein 8.0 6.3 - 8.6 g/dL    Albumin 4.30 3.40 - 4.80 g/dL    ALT (SGPT) 40 21 - 72 U/L    AST (SGOT) 37 17 - 59 U/L    Alkaline Phosphatase 70 38 - 126 U/L    Total Bilirubin 0.8 0.2 - 1.3 mg/dL    eGFR Non  Amer 83 56 - 130 mL/min/1.73    Globulin 3.7 (H) 2.3 - 3.5 gm/dL    A/G Ratio 1.2 1.1 - 1.8 g/dL    BUN/Creatinine Ratio 7.3 7.0 - 25.0    Anion Gap 10.0 5.0 - 15.0 mmol/L   Results for orders placed or performed in visit on 06/21/17   CBC Auto Differential   Result Value Ref Range    WBC 9.47 3.20 - 9.80 10*3/mm3    RBC 4.63 4.37 - 5.74 10*6/mm3    Hemoglobin 15.0 13.7 - 17.3 g/dL    Hematocrit 43.0 39.0 - 49.0 %    MCV 92.9 80.0 - 98.0 fL    MCH 32.4 26.5 - 34.0 pg    MCHC 34.9 31.5 - 36.3 g/dL    RDW 14.6 (H) 11.5 - 14.5 %    RDW-SD 49.2 (H) 35.1 - 43.9 fl    MPV 11.5 8.0 - 12.0 fL    Platelets 401 150 - 450 10*3/mm3    Neutrophil % 48.9 37.0 - 80.0 %    Lymphocyte % 35.2 10.0 - 50.0 %    Monocyte % 10.7 0.0 - 12.0 %    Eosinophil % 4.8 0.0 - 7.0 %    Basophil % 0.3 0.0 - 2.0 %    Immature Grans % 0.1 0.0 - 0.5 %    Neutrophils, Absolute 4.64 2.00 - 8.60 10*3/mm3    Lymphocytes, Absolute 3.33 0.60 - 4.20 10*3/mm3    Monocytes, Absolute 1.01 (H) 0.00 - 0.90 10*3/mm3    Eosinophils, Absolute 0.45 0.00 - 0.70 10*3/mm3    Basophils, Absolute 0.03 0.00 - 0.20 10*3/mm3    Immature Grans, Absolute 0.01 0.00 - 0.02 10*3/mm3   Comprehensive Metabolic Panel   Result Value Ref Range    Glucose 106 (H) 60 - 100 mg/dL    BUN 9 7 - 21 mg/dL    Creatinine 0.97 0.70 - 1.30 mg/dL     Sodium 140 137 - 145 mmol/L    Potassium 3.5 3.5 - 5.1 mmol/L    Chloride 103 95 - 110 mmol/L    CO2 27.0 22.0 - 31.0 mmol/L    Calcium 9.1 8.4 - 10.2 mg/dL    Total Protein 7.6 6.3 - 8.6 g/dL    Albumin 4.10 3.40 - 4.80 g/dL    ALT (SGPT) 37 21 - 72 U/L    AST (SGOT) 56 17 - 59 U/L    Alkaline Phosphatase 87 38 - 126 U/L    Total Bilirubin 0.9 0.2 - 1.3 mg/dL    eGFR Non  Amer 82 56 - 130 mL/min/1.73    Globulin 3.5 2.3 - 3.5 gm/dL    A/G Ratio 1.2 1.1 - 1.8 g/dL    BUN/Creatinine Ratio 9.3 7.0 - 25.0    Anion Gap 10.0 5.0 - 15.0 mmol/L   Results for orders placed or performed in visit on 03/08/17   Vitamin B12   Result Value Ref Range    Vitamin B-12 250 239 - 931 pg/mL   Results for orders placed or performed in visit on 03/08/17   CBC Auto Differential   Result Value Ref Range    WBC 9.69 3.20 - 9.80 10*3/mm3    RBC 4.63 4.37 - 5.74 10*6/mm3    Hemoglobin 15.3 13.7 - 17.3 g/dL    Hematocrit 43.1 39.0 - 49.0 %    MCV 93.1 80.0 - 98.0 fL    MCH 33.0 26.5 - 34.0 pg    MCHC 35.5 31.5 - 36.3 g/dL    RDW 14.5 11.5 - 14.5 %    RDW-SD 49.5 (H) 35.1 - 43.9 fl    MPV 10.4 8.0 - 12.0 fL    Platelets 391 150 - 450 10*3/mm3    Neutrophil % 42.7 37.0 - 80.0 %    Lymphocyte % 42.6 10.0 - 50.0 %    Monocyte % 8.7 0.0 - 12.0 %    Eosinophil % 5.4 0.0 - 7.0 %    Basophil % 0.5 0.0 - 2.0 %    Immature Grans % 0.1 0.0 - 0.5 %    Neutrophils, Absolute 4.14 2.00 - 8.60 10*3/mm3    Lymphocytes, Absolute 4.13 0.60 - 4.20 10*3/mm3    Monocytes, Absolute 0.84 0.00 - 0.90 10*3/mm3    Eosinophils, Absolute 0.52 0.00 - 0.70 10*3/mm3    Basophils, Absolute 0.05 0.00 - 0.20 10*3/mm3    Immature Grans, Absolute 0.01 0.00 - 0.02 10*3/mm3     *Note: Due to a large number of results and/or encounters for the requested time period, some results have not been displayed. A complete set of results can be found in Results Review.

## 2018-08-28 NOTE — PATIENT INSTRUCTIONS
Colonoscopy, Adult  A colonoscopy is an exam to look at the entire large intestine. During the exam, a lubricated, bendable tube is inserted into the anus and then passed into the rectum, colon, and other parts of the large intestine.  A colonoscopy is often done as a part of normal colorectal screening or in response to certain symptoms, such as anemia, persistent diarrhea, abdominal pain, and blood in the stool. The exam can help screen for and diagnose medical problems, including:  · Tumors.  · Polyps.  · Inflammation.  · Areas of bleeding.    Tell a health care provider about:  · Any allergies you have.  · All medicines you are taking, including vitamins, herbs, eye drops, creams, and over-the-counter medicines.  · Any problems you or family members have had with anesthetic medicines.  · Any blood disorders you have.  · Any surgeries you have had.  · Any medical conditions you have.  · Any problems you have had passing stool.  What are the risks?  Generally, this is a safe procedure. However, problems may occur, including:  · Bleeding.  · A tear in the intestine.  · A reaction to medicines given during the exam.  · Infection (rare).    What happens before the procedure?  Eating and drinking restrictions  Follow instructions from your health care provider about eating and drinking, which may include:  · A few days before the procedure - follow a low-fiber diet. Avoid nuts, seeds, dried fruit, raw fruits, and vegetables.  · 1-3 days before the procedure - follow a clear liquid diet. Drink only clear liquids, such as clear broth or bouillon, black coffee or tea, clear juice, clear soft drinks or sports drinks, gelatin dessert, and popsicles. Avoid any liquids that contain red or purple dye.  · On the day of the procedure - do not eat or drink anything during the 2 hours before the procedure, or within the time period that your health care provider recommends.    Bowel prep  If you were prescribed an oral bowel prep  to clean out your colon:  · Take it as told by your health care provider. Starting the day before your procedure, you will need to drink a large amount of medicated liquid. The liquid will cause you to have multiple loose stools until your stool is almost clear or light green.  · If your skin or anus gets irritated from diarrhea, you may use these to relieve the irritation:  ? Medicated wipes, such as adult wet wipes with aloe and vitamin E.  ? A skin soothing-product like petroleum jelly.  · If you vomit while drinking the bowel prep, take a break for up to 60 minutes and then begin the bowel prep again. If vomiting continues and you cannot take the bowel prep without vomiting, call your health care provider.    General instructions  · Ask your health care provider about changing or stopping your regular medicines. This is especially important if you are taking diabetes medicines or blood thinners.  · Plan to have someone take you home from the hospital or clinic.  What happens during the procedure?  · An IV tube may be inserted into one of your veins.  · You will be given medicine to help you relax (sedative).  · To reduce your risk of infection:  ? Your health care team will wash or sanitize their hands.  ? Your anal area will be washed with soap.  · You will be asked to lie on your side with your knees bent.  · Your health care provider will lubricate a long, thin, flexible tube. The tube will have a camera and a light on the end.  · The tube will be inserted into your anus.  · The tube will be gently eased through your rectum and colon.  · Air will be delivered into your colon to keep it open. You may feel some pressure or cramping.  · The camera will be used to take images during the procedure.  · A small tissue sample may be removed from your body to be examined under a microscope (biopsy). If any potential problems are found, the tissue will be sent to a lab for testing.  · If small polyps are found, your  health care provider may remove them and have them checked for cancer cells.  · The tube that was inserted into your anus will be slowly removed.  The procedure may vary among health care providers and hospitals.  What happens after the procedure?  · Your blood pressure, heart rate, breathing rate, and blood oxygen level will be monitored until the medicines you were given have worn off.  · Do not drive for 24 hours after the exam.  · You may have a small amount of blood in your stool.  · You may pass gas and have mild abdominal cramping or bloating due to the air that was used to inflate your colon during the exam.  · It is up to you to get the results of your procedure. Ask your health care provider, or the department performing the procedure, when your results will be ready.  This information is not intended to replace advice given to you by your health care provider. Make sure you discuss any questions you have with your health care provider.  Document Released: 12/15/2001 Document Revised: 10/18/2017 Document Reviewed: 02/28/2017  [email or web address] Interactive Patient Education © 2018 [employer]

## 2019-01-18 DIAGNOSIS — K21.9 GASTROESOPHAGEAL REFLUX DISEASE WITHOUT ESOPHAGITIS: ICD-10-CM

## 2019-01-18 RX ORDER — OMEPRAZOLE 20 MG/1
CAPSULE, DELAYED RELEASE ORAL
Qty: 60 CAPSULE | Refills: 5 | Status: SHIPPED | OUTPATIENT
Start: 2019-01-18 | End: 2020-01-27

## 2019-02-22 ENCOUNTER — ANESTHESIA EVENT (OUTPATIENT)
Dept: GASTROENTEROLOGY | Facility: HOSPITAL | Age: 54
End: 2019-02-22

## 2019-02-22 ENCOUNTER — HOSPITAL ENCOUNTER (OUTPATIENT)
Facility: HOSPITAL | Age: 54
Setting detail: HOSPITAL OUTPATIENT SURGERY
Discharge: HOME | End: 2019-02-22
Attending: INTERNAL MEDICINE | Admitting: INTERNAL MEDICINE

## 2019-02-22 ENCOUNTER — ANESTHESIA (OUTPATIENT)
Dept: GASTROENTEROLOGY | Facility: HOSPITAL | Age: 54
End: 2019-02-22

## 2019-02-22 VITALS
TEMPERATURE: 97.7 F | WEIGHT: 188.71 LBS | RESPIRATION RATE: 16 BRPM | HEART RATE: 71 BPM | DIASTOLIC BLOOD PRESSURE: 69 MMHG | OXYGEN SATURATION: 95 % | HEIGHT: 72 IN | BODY MASS INDEX: 25.56 KG/M2 | SYSTOLIC BLOOD PRESSURE: 105 MMHG

## 2019-02-22 DIAGNOSIS — Z12.11 ENCOUNTER FOR SCREENING COLONOSCOPY: ICD-10-CM

## 2019-02-22 PROCEDURE — 88305 TISSUE EXAM BY PATHOLOGIST: CPT | Performed by: PATHOLOGY

## 2019-02-22 PROCEDURE — 45385 COLONOSCOPY W/LESION REMOVAL: CPT | Performed by: INTERNAL MEDICINE

## 2019-02-22 PROCEDURE — 88305 TISSUE EXAM BY PATHOLOGIST: CPT | Performed by: INTERNAL MEDICINE

## 2019-02-22 PROCEDURE — 25010000002 PROPOFOL 10 MG/ML EMULSION: Performed by: NURSE ANESTHETIST, CERTIFIED REGISTERED

## 2019-02-22 PROCEDURE — 25010000002 FENTANYL CITRATE (PF) 100 MCG/2ML SOLUTION: Performed by: NURSE ANESTHETIST, CERTIFIED REGISTERED

## 2019-02-22 PROCEDURE — 45380 COLONOSCOPY AND BIOPSY: CPT | Performed by: INTERNAL MEDICINE

## 2019-02-22 RX ORDER — DEXTROSE AND SODIUM CHLORIDE 5; .45 G/100ML; G/100ML
30 INJECTION, SOLUTION INTRAVENOUS CONTINUOUS PRN
Status: DISCONTINUED | OUTPATIENT
Start: 2019-02-22 | End: 2019-02-22 | Stop reason: HOSPADM

## 2019-02-22 RX ORDER — PROPOFOL 10 MG/ML
VIAL (ML) INTRAVENOUS AS NEEDED
Status: DISCONTINUED | OUTPATIENT
Start: 2019-02-22 | End: 2019-02-22 | Stop reason: SURG

## 2019-02-22 RX ORDER — FENTANYL CITRATE 50 UG/ML
INJECTION, SOLUTION INTRAMUSCULAR; INTRAVENOUS AS NEEDED
Status: DISCONTINUED | OUTPATIENT
Start: 2019-02-22 | End: 2019-02-22 | Stop reason: SURG

## 2019-02-22 RX ADMIN — PROPOFOL 70 MG: 10 INJECTION, EMULSION INTRAVENOUS at 11:18

## 2019-02-22 RX ADMIN — PROPOFOL 30 MG: 10 INJECTION, EMULSION INTRAVENOUS at 11:27

## 2019-02-22 RX ADMIN — PROPOFOL 30 MG: 10 INJECTION, EMULSION INTRAVENOUS at 11:25

## 2019-02-22 RX ADMIN — FENTANYL CITRATE 50 MCG: 50 INJECTION, SOLUTION INTRAMUSCULAR; INTRAVENOUS at 11:16

## 2019-02-22 RX ADMIN — PROPOFOL 40 MG: 10 INJECTION, EMULSION INTRAVENOUS at 11:22

## 2019-02-22 RX ADMIN — PROPOFOL 40 MG: 10 INJECTION, EMULSION INTRAVENOUS at 11:30

## 2019-02-22 RX ADMIN — PROPOFOL 20 MG: 10 INJECTION, EMULSION INTRAVENOUS at 11:19

## 2019-02-22 RX ADMIN — FENTANYL CITRATE 50 MCG: 50 INJECTION, SOLUTION INTRAMUSCULAR; INTRAVENOUS at 11:22

## 2019-02-22 RX ADMIN — DEXTROSE AND SODIUM CHLORIDE 30 ML/HR: 5; 450 INJECTION, SOLUTION INTRAVENOUS at 10:39

## 2019-02-22 NOTE — ANESTHESIA POSTPROCEDURE EVALUATION
Patient: Jose Trent    Procedure Summary     Date:  02/22/19 Room / Location:  [facility] ENDOSCOPY 1 / [facility] ENDOSCOPY    Anesthesia Start:  1114 Anesthesia Stop:  1134    Procedure:  COLONOSCOPY (N/A ) Diagnosis:       Encounter for screening colonoscopy      (Encounter for screening colonoscopy [Z12.11])    Surgeon:  Dewayne Beavers MD Provider:  Ariel Amaya CRNA    Anesthesia Type:  MAC ASA Status:  3          Anesthesia Type: MAC  Last vitals  BP   129/85 (02/22/19 1028)   Temp   97.8 °F (36.6 °C) (02/22/19 1028)   Pulse   69 (02/22/19 1028)   Resp   18 (02/22/19 1028)     SpO2   95 % (02/22/19 1028)     Post Anesthesia Care and Evaluation    Patient location during evaluation: bedside  Patient participation: complete - patient participated  Level of consciousness: awake and awake and alert  Pain score: 0  Pain management: satisfactory to patient  Airway patency: patent  Anesthetic complications: No anesthetic complications  PONV Status: none  Cardiovascular status: acceptable and stable  Respiratory status: acceptable, room air and spontaneous ventilation  Hydration status: acceptable

## 2019-02-22 NOTE — H&P
No chief complaint on file.      Subjective    Jose Trent is a 53 y.o. male. he is here today for follow-up.    History of Present Illness  52-year-old male presents for follow-up regarding Crohn's disease.  He is been on Humira for the last 2 years and done very well with this denies any flares in that time.  Lab work was checked at last office visit which was unremarkable.  His last colonoscopy was 7/2/15. 's weight has remained stable.  Bowel movements are about the same 5-6 times per day described as loose however no melena or hematochezia.  His most recent colon surgery was in 2016 due to stricturing secondary to scar tissue.  History of esophageal reflux disease well controlled with omeprazole daily does not need refills at this time.  Generally avoids gastric irritants denies any breakthrough symptoms.  Plan; continue patient on current regimen of Humira for Crohn's disease and omeprazole for GERD.  We'll schedule patient for screening colonoscopy as it is been 3 years since colonoscopy.  Follow-up after test return to office sooner if needed.     The following portions of the patient's history were reviewed and updated as appropriate:   Past Medical History:   Diagnosis Date   • Backache     of indeterminate etiology   • Cobalamin deficiency    • Crohn's disease (CMS/HCC)     With ileocolic stricture now resected   • Crohn's disease, small intestine (CMS/HCC)    • Drug therapy     long term   • GERD (gastroesophageal reflux disease)    • Partial obstruction of small intestine (CMS/HCC)    • Pharyngitis    • Portal hypertension (CMS/HCC)    • Portal vein thrombosis     And splenic vein   • Rhinitis    • Tobacco dependence syndrome     encouraged to stop   • Upper respiratory infection      Past Surgical History:   Procedure Laterality Date   • COLECTOMY PARTIAL / TOTAL      Resection of segment of sigmoid colon, resection of 45 cm of ileum, right colon, with ileotransverse colon anastomosis;  resection of segment of bladder, suprapubic cystostomy. 03/28/1984       • COLONOSCOPY  07/02/2015   • ENDOSCOPY      Prior resect of cecum & ascend colon found.[place] beyond strict has a normal appear.Stenosis of anastomosis, will not allow pass of scope.Linear ulcerat of anastomosis.Narrow strict & not long strict as reported by xray. Dilatation performed. 6/26/2014       • ENDOSCOPY      Normal hypopharynx, esophagus, dudoenum, symmetrical & patent pylorus. Hiatus hernia in GE junction. Mild non-erosive gastritis in antrum. Biopsy taken. 01/31/2013       • EXPLORATORY LAPAROTOMY      Exploratory laparotomy with adhesiolysis.Takedown of previous anastomosis.Resection of terminal ileum and proximal transverse colon w/a stapled CODY anastomosis between ileum and transverse colon. 03/07/2016       • SPLENECTOMY       Splenomegaly and hypersplenism 11/02/2007    • UPPER GASTROINTESTINAL ENDOSCOPY  01/31/2013     Family History   Problem Relation Age of Onset   • Cancer Other        Current Facility-Administered Medications   Medication Dose Route Frequency Provider Last Rate Last Dose   • dextrose 5 % and sodium chloride 0.45 % infusion  30 mL/hr Intravenous Continuous PRN Torrie Paul APRN         No Known Allergies  Social History     Socioeconomic History   • Marital status:      Spouse name: Not on file   • Number of children: Not on file   • Years of education: Not on file   • Highest education level: Not on file   Tobacco Use   • Smoking status: Current Every Day Smoker     Packs/day: 1.00     Types: Cigarettes   • Smokeless tobacco: Never Used   Substance and Sexual Activity   • Alcohol use: No   • Drug use: No   • Sexual activity: Defer     Comment:        Review of Systems  Review of Systems   Constitutional: Negative for activity change, appetite change, chills, diaphoresis, fatigue, fever and unexpected weight change.   HENT: Negative for sore throat and trouble swallowing.    Respiratory:  "Negative for shortness of breath.    Gastrointestinal: Positive for diarrhea (5-6 per day ). Negative for abdominal distention, abdominal pain, anal bleeding, blood in stool, constipation, nausea, rectal pain and vomiting.   Musculoskeletal: Negative for arthralgias.   Skin: Negative for pallor.   Neurological: Negative for light-headedness.        Ht 182.9 cm (72\")   Wt 83.9 kg (185 lb)   BMI 25.09 kg/m²     Objective    Physical Exam   Constitutional: He is oriented to person, place, and time. He appears well-developed and well-nourished. He is cooperative. No distress.   HENT:   Head: Normocephalic and atraumatic.   Neck: Normal range of motion. Neck supple. No thyromegaly present.   Cardiovascular: Normal rate, regular rhythm and normal heart sounds.   Pulmonary/Chest: Effort normal and breath sounds normal. He has no wheezes. He has no rhonchi. He has no rales.   Abdominal: Soft. Normal appearance and bowel sounds are normal. He exhibits no distension. There is no hepatosplenomegaly. There is tenderness (states his normal ). There is no rigidity and no guarding. No hernia.   Lymphadenopathy:     He has no cervical adenopathy.   Neurological: He is alert and oriented to person, place, and time.   Skin: Skin is warm, dry and intact. No rash noted. No pallor.   Psychiatric: He has a normal mood and affect. His speech is normal.     Office Visit on 12/19/2017   Component Date Value Ref Range Status   • Glucose 12/19/2017 98  60 - 100 mg/dL Final   • BUN 12/19/2017 7  7 - 21 mg/dL Final   • Creatinine 12/19/2017 0.96  0.70 - 1.30 mg/dL Final   • Sodium 12/19/2017 138  137 - 145 mmol/L Final   • Potassium 12/19/2017 3.6  3.5 - 5.1 mmol/L Final   • Chloride 12/19/2017 102  95 - 110 mmol/L Final   • CO2 12/19/2017 26.0  22.0 - 31.0 mmol/L Final   • Calcium 12/19/2017 9.1  8.4 - 10.2 mg/dL Final   • Total Protein 12/19/2017 8.0  6.3 - 8.6 g/dL Final   • Albumin 12/19/2017 4.30  3.40 - 4.80 g/dL Final   • ALT (SGPT) " 12/19/2017 40  21 - 72 U/L Final   • AST (SGOT) 12/19/2017 37  17 - 59 U/L Final   • Alkaline Phosphatase 12/19/2017 70  38 - 126 U/L Final   • Total Bilirubin 12/19/2017 0.8  0.2 - 1.3 mg/dL Final   • eGFR Non African Amer 12/19/2017 83  56 - 130 mL/min/1.73 Final   • Globulin 12/19/2017 3.7* 2.3 - 3.5 gm/dL Final   • A/G Ratio 12/19/2017 1.2  1.1 - 1.8 g/dL Final   • BUN/Creatinine Ratio 12/19/2017 7.3  7.0 - 25.0 Final   • Anion Gap 12/19/2017 10.0  5.0 - 15.0 mmol/L Final   • QuantiFERON Incubation 12/19/2017 Comment   Final    Incubated, testing to follow.   • WBC 12/19/2017 10.27* 3.20 - 9.80 10*3/mm3 Final   • RBC 12/19/2017 4.65  4.37 - 5.74 10*6/mm3 Final   • Hemoglobin 12/19/2017 14.9  13.7 - 17.3 g/dL Final   • Hematocrit 12/19/2017 42.8  39.0 - 49.0 % Final   • MCV 12/19/2017 92.0  80.0 - 98.0 fL Final   • MCH 12/19/2017 32.0  26.5 - 34.0 pg Final   • MCHC 12/19/2017 34.8  31.5 - 36.3 g/dL Final   • RDW 12/19/2017 14.6* 11.5 - 14.5 % Final   • RDW-SD 12/19/2017 49.2* 35.1 - 43.9 fl Final   • MPV 12/19/2017 10.4  8.0 - 12.0 fL Final   • Platelets 12/19/2017 373  150 - 450 10*3/mm3 Final   • Neutrophil % 12/19/2017 47.0  37.0 - 80.0 % Final   • Lymphocyte % 12/19/2017 36.6  10.0 - 50.0 % Final   • Monocyte % 12/19/2017 11.0  0.0 - 12.0 % Final   • Eosinophil % 12/19/2017 4.7  0.0 - 7.0 % Final   • Basophil % 12/19/2017 0.5  0.0 - 2.0 % Final   • Immature Grans % 12/19/2017 0.2  0.0 - 0.5 % Final   • Neutrophils, Absolute 12/19/2017 4.83  2.00 - 8.60 10*3/mm3 Final   • Lymphocytes, Absolute 12/19/2017 3.76  0.60 - 4.20 10*3/mm3 Final   • Monocytes, Absolute 12/19/2017 1.13* 0.00 - 0.90 10*3/mm3 Final   • Eosinophils, Absolute 12/19/2017 0.48  0.00 - 0.70 10*3/mm3 Final   • Basophils, Absolute 12/19/2017 0.05  0.00 - 0.20 10*3/mm3 Final   • Immature Grans, Absolute 12/19/2017 0.02  0.00 - 0.02 10*3/mm3 Final   • QuantiFERON-TB Gold In Tube 12/19/2017 Negative  Negative Final   • QuantiFERON Criteria  12/19/2017 Comment   Final    To be considered positive a specimen should have a TB Ag minus Nil  value greater than or equal to 0.35 IU/mL and in addition the TB Ag  minus Nil value must be greater than or equal to 25% of the Nil  value. There may be insufficient information in these values to  differentiate between some negative and some indeterminate test  values.   • QuantiFERON TB Ag Value 12/19/2017 0.03  IU/mL Final   • QuantiFERON Nil Value 12/19/2017 0.03  IU/mL Final   • QuantiFERON Mitogen Value 12/19/2017 >10.00  IU/mL Final   • QFT TB Ag minus Nil Value 12/19/2017 0.00  IU/mL Final   • Interpretation 12/19/2017 Comment   Final    The QuantiFERON TB Gold (in Tube) assay is intended for use as an aid  in the diagnosis of TB infection. Negative results suggest that there  is no TB infection. In patients with high suspicion of exposure, a  negative test should be repeated. A positive test indicates infection  with Mycobacterium tuberculosis. Among individuals without  tuberculosis infection, a positive test may be due to exposure to  M. kansasii, M. szulgai or M. marinum. On the Internet, go to  cdc.gov/tb for further details.     Assessment/Plan      1. Encounter for screening colonoscopy    .       Orders placed during this encounter include:  Orders Placed This Encounter   Procedures   • Obtain Informed Consent     Standing Status:   Standing     Number of Occurrences:   1     Order Specific Question:   Informed Consent Given For     Answer:   Colonoscopy   • Insert Peripheral IV     Standing Status:   Standing     Number of Occurrences:   1       COLONOSCOPY (N/A)    Review and/or summary of lab tests, radiology, procedures, medications. Review and summary of old records and obtaining of history. The risks and benefits of my recommendations, as well as other treatment options were discussed with the patient today. Questions were answered.    New Medications Ordered This Visit   Medications   • dextrose  5 % and sodium chloride 0.45 % infusion       Follow-up: No Follow-up on file.          This document has been electronically signed by Dewayne Beavers MD on February 22, 2019 10:29 AM             Results for orders placed or performed in visit on 12/19/17   Reflexed QuantiFERON In   Result Value Ref Range    QuantiFERON-TB Gold In Tube Negative Negative    QuantiFERON Criteria Comment     QuantiFERON TB Ag Value 0.03 IU/mL    QuantiFERON Nil Value 0.03 IU/mL    QuantiFERON Mitogen Value >10.00 IU/mL    QFT TB Ag minus Nil Value 0.00 IU/mL    Interpretation Comment    QuantiFERON TB Gold   Result Value Ref Range    QuantiFERON Incubation Comment    CBC Auto Differential   Result Value Ref Range    WBC 10.27 (H) 3.20 - 9.80 10*3/mm3    RBC 4.65 4.37 - 5.74 10*6/mm3    Hemoglobin 14.9 13.7 - 17.3 g/dL    Hematocrit 42.8 39.0 - 49.0 %    MCV 92.0 80.0 - 98.0 fL    MCH 32.0 26.5 - 34.0 pg    MCHC 34.8 31.5 - 36.3 g/dL    RDW 14.6 (H) 11.5 - 14.5 %    RDW-SD 49.2 (H) 35.1 - 43.9 fl    MPV 10.4 8.0 - 12.0 fL    Platelets 373 150 - 450 10*3/mm3    Neutrophil % 47.0 37.0 - 80.0 %    Lymphocyte % 36.6 10.0 - 50.0 %    Monocyte % 11.0 0.0 - 12.0 %    Eosinophil % 4.7 0.0 - 7.0 %    Basophil % 0.5 0.0 - 2.0 %    Immature Grans % 0.2 0.0 - 0.5 %    Neutrophils, Absolute 4.83 2.00 - 8.60 10*3/mm3    Lymphocytes, Absolute 3.76 0.60 - 4.20 10*3/mm3    Monocytes, Absolute 1.13 (H) 0.00 - 0.90 10*3/mm3    Eosinophils, Absolute 0.48 0.00 - 0.70 10*3/mm3    Basophils, Absolute 0.05 0.00 - 0.20 10*3/mm3    Immature Grans, Absolute 0.02 0.00 - 0.02 10*3/mm3   Comprehensive Metabolic Panel   Result Value Ref Range    Glucose 98 60 - 100 mg/dL    BUN 7 7 - 21 mg/dL    Creatinine 0.96 0.70 - 1.30 mg/dL    Sodium 138 137 - 145 mmol/L    Potassium 3.6 3.5 - 5.1 mmol/L    Chloride 102 95 - 110 mmol/L    CO2 26.0 22.0 - 31.0 mmol/L    Calcium 9.1 8.4 - 10.2 mg/dL    Total Protein 8.0 6.3 - 8.6 g/dL    Albumin 4.30 3.40 - 4.80 g/dL    ALT  (SGPT) 40 21 - 72 U/L    AST (SGOT) 37 17 - 59 U/L    Alkaline Phosphatase 70 38 - 126 U/L    Total Bilirubin 0.8 0.2 - 1.3 mg/dL    eGFR Non  Amer 83 56 - 130 mL/min/1.73    Globulin 3.7 (H) 2.3 - 3.5 gm/dL    A/G Ratio 1.2 1.1 - 1.8 g/dL    BUN/Creatinine Ratio 7.3 7.0 - 25.0    Anion Gap 10.0 5.0 - 15.0 mmol/L   Results for orders placed or performed in visit on 06/21/17   CBC Auto Differential   Result Value Ref Range    WBC 9.47 3.20 - 9.80 10*3/mm3    RBC 4.63 4.37 - 5.74 10*6/mm3    Hemoglobin 15.0 13.7 - 17.3 g/dL    Hematocrit 43.0 39.0 - 49.0 %    MCV 92.9 80.0 - 98.0 fL    MCH 32.4 26.5 - 34.0 pg    MCHC 34.9 31.5 - 36.3 g/dL    RDW 14.6 (H) 11.5 - 14.5 %    RDW-SD 49.2 (H) 35.1 - 43.9 fl    MPV 11.5 8.0 - 12.0 fL    Platelets 401 150 - 450 10*3/mm3    Neutrophil % 48.9 37.0 - 80.0 %    Lymphocyte % 35.2 10.0 - 50.0 %    Monocyte % 10.7 0.0 - 12.0 %    Eosinophil % 4.8 0.0 - 7.0 %    Basophil % 0.3 0.0 - 2.0 %    Immature Grans % 0.1 0.0 - 0.5 %    Neutrophils, Absolute 4.64 2.00 - 8.60 10*3/mm3    Lymphocytes, Absolute 3.33 0.60 - 4.20 10*3/mm3    Monocytes, Absolute 1.01 (H) 0.00 - 0.90 10*3/mm3    Eosinophils, Absolute 0.45 0.00 - 0.70 10*3/mm3    Basophils, Absolute 0.03 0.00 - 0.20 10*3/mm3    Immature Grans, Absolute 0.01 0.00 - 0.02 10*3/mm3   Comprehensive Metabolic Panel   Result Value Ref Range    Glucose 106 (H) 60 - 100 mg/dL    BUN 9 7 - 21 mg/dL    Creatinine 0.97 0.70 - 1.30 mg/dL    Sodium 140 137 - 145 mmol/L    Potassium 3.5 3.5 - 5.1 mmol/L    Chloride 103 95 - 110 mmol/L    CO2 27.0 22.0 - 31.0 mmol/L    Calcium 9.1 8.4 - 10.2 mg/dL    Total Protein 7.6 6.3 - 8.6 g/dL    Albumin 4.10 3.40 - 4.80 g/dL    ALT (SGPT) 37 21 - 72 U/L    AST (SGOT) 56 17 - 59 U/L    Alkaline Phosphatase 87 38 - 126 U/L    Total Bilirubin 0.9 0.2 - 1.3 mg/dL    eGFR Non  Amer 82 56 - 130 mL/min/1.73    Globulin 3.5 2.3 - 3.5 gm/dL    A/G Ratio 1.2 1.1 - 1.8 g/dL    BUN/Creatinine Ratio 9.3 7.0 -  25.0    Anion Gap 10.0 5.0 - 15.0 mmol/L   Results for orders placed or performed in visit on 03/08/17   Vitamin B12   Result Value Ref Range    Vitamin B-12 250 239 - 931 pg/mL   Results for orders placed or performed in visit on 03/08/17   CBC Auto Differential   Result Value Ref Range    WBC 9.69 3.20 - 9.80 10*3/mm3    RBC 4.63 4.37 - 5.74 10*6/mm3    Hemoglobin 15.3 13.7 - 17.3 g/dL    Hematocrit 43.1 39.0 - 49.0 %    MCV 93.1 80.0 - 98.0 fL    MCH 33.0 26.5 - 34.0 pg    MCHC 35.5 31.5 - 36.3 g/dL    RDW 14.5 11.5 - 14.5 %    RDW-SD 49.5 (H) 35.1 - 43.9 fl    MPV 10.4 8.0 - 12.0 fL    Platelets 391 150 - 450 10*3/mm3    Neutrophil % 42.7 37.0 - 80.0 %    Lymphocyte % 42.6 10.0 - 50.0 %    Monocyte % 8.7 0.0 - 12.0 %    Eosinophil % 5.4 0.0 - 7.0 %    Basophil % 0.5 0.0 - 2.0 %    Immature Grans % 0.1 0.0 - 0.5 %    Neutrophils, Absolute 4.14 2.00 - 8.60 10*3/mm3    Lymphocytes, Absolute 4.13 0.60 - 4.20 10*3/mm3    Monocytes, Absolute 0.84 0.00 - 0.90 10*3/mm3    Eosinophils, Absolute 0.52 0.00 - 0.70 10*3/mm3    Basophils, Absolute 0.05 0.00 - 0.20 10*3/mm3    Immature Grans, Absolute 0.01 0.00 - 0.02 10*3/mm3     *Note: Due to a large number of results and/or encounters for the requested time period, some results have not been displayed. A complete set of results can be found in Results Review.

## 2019-02-22 NOTE — ANESTHESIA PREPROCEDURE EVALUATION
Anesthesia Evaluation     Patient summary reviewed and Nursing notes reviewed   NPO Solid Status: > 8 hours  NPO Liquid Status: > 2 hours           Airway   Mallampati: II  TM distance: >3 FB  Neck ROM: full  No difficulty expected  Dental - normal exam     Pulmonary - normal exam   (+) a smoker Current Smoked day of surgery, recent URI,   Cardiovascular - normal exam    (+) DVT,       Neuro/Psych- negative ROS  GI/Hepatic/Renal/Endo    (+)  GERD,  liver disease,     ROS Comment: Crohn's disease    Musculoskeletal     (+) back pain,   Abdominal  - normal exam   Substance History - negative use     OB/GYN negative ob/gyn ROS         Other - negative ROS                       Anesthesia Plan    ASA 3     MAC     intravenous induction   Anesthetic plan, all risks, benefits, and alternatives have been provided, discussed and informed consent has been obtained with: patient.

## 2019-02-25 LAB
LAB AP CASE REPORT: NORMAL
PATH REPORT.FINAL DX SPEC: NORMAL
PATH REPORT.GROSS SPEC: NORMAL

## 2019-03-05 ENCOUNTER — LAB (OUTPATIENT)
Dept: LAB | Facility: HOSPITAL | Age: 54
End: 2019-03-05

## 2019-03-05 ENCOUNTER — OFFICE VISIT (OUTPATIENT)
Dept: GASTROENTEROLOGY | Facility: CLINIC | Age: 54
End: 2019-03-05

## 2019-03-05 VITALS
DIASTOLIC BLOOD PRESSURE: 88 MMHG | HEIGHT: 72 IN | SYSTOLIC BLOOD PRESSURE: 132 MMHG | BODY MASS INDEX: 26.28 KG/M2 | HEART RATE: 70 BPM | WEIGHT: 194 LBS

## 2019-03-05 DIAGNOSIS — K50.819 CROHN'S DISEASE OF SMALL AND LARGE INTESTINES WITH COMPLICATION (HCC): Primary | ICD-10-CM

## 2019-03-05 DIAGNOSIS — K50.819 CROHN'S DISEASE OF SMALL AND LARGE INTESTINES WITH COMPLICATION (HCC): ICD-10-CM

## 2019-03-05 LAB
ALBUMIN SERPL-MCNC: 4.1 G/DL (ref 3.4–4.8)
ALBUMIN/GLOB SERPL: 1.2 G/DL (ref 1.1–1.8)
ALP SERPL-CCNC: 78 U/L (ref 38–126)
ALT SERPL W P-5'-P-CCNC: 29 U/L (ref 21–72)
ANION GAP SERPL CALCULATED.3IONS-SCNC: 5 MMOL/L (ref 5–15)
AST SERPL-CCNC: 31 U/L (ref 17–59)
BASOPHILS # BLD AUTO: 0.11 10*3/MM3 (ref 0–0.2)
BASOPHILS NFR BLD AUTO: 0.8 % (ref 0–1.5)
BILIRUB SERPL-MCNC: 0.9 MG/DL (ref 0.2–1.3)
BUN BLD-MCNC: 7 MG/DL (ref 7–21)
BUN/CREAT SERPL: 8.6 (ref 7–25)
CALCIUM SPEC-SCNC: 9.1 MG/DL (ref 8.4–10.2)
CHLORIDE SERPL-SCNC: 103 MMOL/L (ref 95–110)
CO2 SERPL-SCNC: 29 MMOL/L (ref 22–31)
CREAT BLD-MCNC: 0.81 MG/DL (ref 0.7–1.3)
DEPRECATED RDW RBC AUTO: 50.3 FL (ref 37–54)
EOSINOPHIL # BLD AUTO: 0.64 10*3/MM3 (ref 0–0.4)
EOSINOPHIL NFR BLD AUTO: 4.7 % (ref 0.3–6.2)
ERYTHROCYTE [DISTWIDTH] IN BLOOD BY AUTOMATED COUNT: 14.9 % (ref 12.3–15.4)
GFR SERPL CREATININE-BSD FRML MDRD: 100 ML/MIN/1.73 (ref 56–130)
GLOBULIN UR ELPH-MCNC: 3.4 GM/DL (ref 2.3–3.5)
GLUCOSE BLD-MCNC: 92 MG/DL (ref 60–100)
HCT VFR BLD AUTO: 45 % (ref 37.5–51)
HGB BLD-MCNC: 15.2 G/DL (ref 13–17.7)
IMM GRANULOCYTES # BLD AUTO: 0.05 10*3/MM3 (ref 0–0.05)
IMM GRANULOCYTES NFR BLD AUTO: 0.4 % (ref 0–0.5)
IRON 24H UR-MRATE: 121 MCG/DL (ref 49–181)
IRON SATN MFR SERPL: 34 % (ref 20–55)
LYMPHOCYTES # BLD AUTO: 4.62 10*3/MM3 (ref 0.7–3.1)
LYMPHOCYTES NFR BLD AUTO: 33.6 % (ref 19.6–45.3)
MCH RBC QN AUTO: 30.9 PG (ref 26.6–33)
MCHC RBC AUTO-ENTMCNC: 33.8 G/DL (ref 31.5–35.7)
MCV RBC AUTO: 91.5 FL (ref 79–97)
MONOCYTES # BLD AUTO: 0.84 10*3/MM3 (ref 0.1–0.9)
MONOCYTES NFR BLD AUTO: 6.1 % (ref 5–12)
NEUTROPHILS # BLD AUTO: 7.49 10*3/MM3 (ref 1.4–7)
NEUTROPHILS NFR BLD AUTO: 54.4 % (ref 42.7–76)
NRBC BLD AUTO-RTO: 0 /100 WBC (ref 0–0)
PLATELET # BLD AUTO: 416 10*3/MM3 (ref 140–450)
PMV BLD AUTO: 10.9 FL (ref 6–12)
POTASSIUM BLD-SCNC: 3.7 MMOL/L (ref 3.5–5.1)
PROT SERPL-MCNC: 7.5 G/DL (ref 6.3–8.6)
RBC # BLD AUTO: 4.92 10*6/MM3 (ref 4.14–5.8)
SODIUM BLD-SCNC: 137 MMOL/L (ref 137–145)
TIBC SERPL-MCNC: 358 MCG/DL (ref 261–462)
VIT B12 BLD-MCNC: 169 PG/ML (ref 239–931)
WBC NRBC COR # BLD: 13.75 10*3/MM3 (ref 3.4–10.8)

## 2019-03-05 PROCEDURE — 83540 ASSAY OF IRON: CPT | Performed by: NURSE PRACTITIONER

## 2019-03-05 PROCEDURE — 86480 TB TEST CELL IMMUN MEASURE: CPT | Performed by: NURSE PRACTITIONER

## 2019-03-05 PROCEDURE — 99213 OFFICE O/P EST LOW 20 MIN: CPT | Performed by: NURSE PRACTITIONER

## 2019-03-05 PROCEDURE — 36415 COLL VENOUS BLD VENIPUNCTURE: CPT

## 2019-03-05 PROCEDURE — 80053 COMPREHEN METABOLIC PANEL: CPT

## 2019-03-05 PROCEDURE — 82607 VITAMIN B-12: CPT | Performed by: NURSE PRACTITIONER

## 2019-03-05 PROCEDURE — 83550 IRON BINDING TEST: CPT | Performed by: NURSE PRACTITIONER

## 2019-03-05 PROCEDURE — 85025 COMPLETE CBC W/AUTO DIFF WBC: CPT

## 2019-03-05 NOTE — PATIENT INSTRUCTIONS
Crohn Disease  Crohn disease is a long-lasting (chronic) disease that affects your gastrointestinal (GI) tract. It often causes irritation and swelling (inflammation) in your small intestine and the beginning of your large intestine. However, it can affect any part of your GI tract. Crohn disease is part of a group of illnesses that are known as inflammatory bowel disease (IBD).  Crohn disease may start slowly and get worse over time. Symptoms may come and go. They may also disappear for months or even years at a time (remission).  What are the causes?  The exact cause of Crohn disease is not known. It may be a response that causes your body's defense system (immune system) to mistakenly attack healthy cells and tissues (autoimmune response). Your genes and your environment may also play a role.  What increases the risk?  You may be at greater risk for Crohn disease if you:  · Have other family members with Crohn disease or another IBD.  · Use any tobacco products, including cigarettes, chewing tobacco, or electronic cigarettes.  · Are in your 20s.  · Have Eastern  ancestry.    What are the signs or symptoms?  The main signs and symptoms of Crohn disease involve your GI tract. These include:  · Diarrhea.  · Rectal bleeding.  · An urgent need to move your bowels.  · The feeling that you are not finished having a bowel movement.  · Abdominal pain or cramping.  · Constipation.    General signs and symptoms of Crohn disease may also include:  · Unexplained weight loss.  · Fatigue.  · Fever.  · Nausea.  · Loss of appetite.  · Joint pain  · Changes in vision.  · Red bumps on your skin.    How is this diagnosed?  Your health care provider may suspect Crohn disease based on your symptoms and your medical history. Your health care provider will do a physical exam. You may need to see a health care provider who specializes in diseases of the digestive tract (gastroenterologist). You may also have tests to help your  health care providers make a diagnosis. These may include:  · Blood tests.  · Stool sample tests.  · Imaging tests, such as X-rays and CT scans.  · Tests to examine the inside of your intestines using a long, flexible tube that has a light and a camera on the end (endoscopy or colonoscopy).  · A procedure to take tissue samples from inside your bowel (biopsy) to be examined under a microscope.    How is this treated?  There is no cure for Crohn disease. Treatment will focus on managing your symptoms. Crohn disease affects each person differently. Your treatment may include:  · Resting your bowels. Drinking only clear liquids or getting nutrition through an IV for a period of time gives your bowels a chance to heal because they are not passing stools.  · Medicines. These may be used alone or in combination (combination therapy). These may include antibiotic medicines. You may be given medicines that help to:  ? Reduce inflammation.  ? Control your immune system activity.  ? Fight infections.  ? Relieve cramps and prevent diarrhea.  ? Control your pain.  · Surgery. You may need surgery if:  ? Medicines and other treatments are no longer working.  ? You develop complications from severe Crohn disease.  ? A section of your intestine becomes so damaged that it needs to be removed.    Follow these instructions at home:  · Take medicines only as directed by your health care provider.  · If you were prescribed an antibiotic medicine, finish it all even if you start to feel better.  · Keep all follow-up visits as directed by your health care provider. This is important.  · Talk with your health care provider about changing your diet. This may help your symptoms. Your health care provide may recommend changes, such as:  ? Drinking more fluids.  ? Avoiding milk and other foods that contain lactose.  ? Eating a low-fat diet.  ? Avoiding high-fiber foods, such as popcorn and nuts.  ? Avoiding carbonated beverages, such as  soda.  ? Eating smaller meals more often rather than eating large meals.  ? Keeping a food diary to identify foods that make your symptoms better or worse.  · Do not use any tobacco products, including cigarettes, chewing tobacco, or electronic cigarettes. If you need help quitting, ask your health care provider.  · Limit alcohol intake to no more than 1 drink per day for nonpregnant women and 2 drinks per day for men. One drink equals 12 ounces of beer, 5 ounces of wine, or 1½ ounces of hard liquor.  · Exercise daily or as directed by your health care provider.  Contact a health care provider if:  · You have diarrhea, abdominal cramps, and other gastrointestinal problems that are present almost all of the time.  · Your symptoms do not improve with treatment.  · You continue to lose weight.  · You develop a rash or sores on your skin.  · You develop eye problems.  · You have a fever.  · Your symptoms get worse.  · You develop new symptoms.  Get help right away if:  · You have bloody diarrhea.  · You develop severe abdominal pain.  · You cannot pass stools.  This information is not intended to replace advice given to you by your health care provider. Make sure you discuss any questions you have with your health care provider.  Document Released: 09/27/2006 Document Revised: 04/27/2017 Document Reviewed: 08/05/2015  [employer] Interactive Patient Education © 2018 [employer]

## 2019-03-05 NOTE — PROGRESS NOTES
Chief Complaint   Patient presents with   • Crohn's Disease       Subjective    Jose Trent is a 53 y.o. male. he is here today for follow-up.    History of Present Illness  53-year-old male presents for follow-up regarding Crohn's disease.  Has been on Humira and symptoms have been stable denies any flares or problems recently.  He underwent colonoscopy 2/22/19 and is here to discuss results.  He denies any appetite changes or changes in weight without trying.  Reports his bowel movements are about 5 times per day with no melena or hematochezia.  In 2016 he had exploratory laparotomy with lysis of adhesions takedown of a previous anastomosis and partial colon resection.  Splenectomy in 2007.  Colonoscopy noted normal perianal digital rectal exam.  Evidence of prior end-to-end ileocolonic anastomosis in the ascending colon patent and characterized by healthy-appearing mucosa.  Localized area in the terminal ileum was mildly erythematous.  Biopsies were obtained.  A small polyp was found and removed from descending colon.  Several biopsies were obtained throughout the colon.  Terminal ileum biopsy no no significant diagnosis.  Transverse colon biopsy no no significant pathological diagnosis.  Descending colon polyp was adenomatous.  And random colonic biopsy in the sigmoid colon showed no significant pathologic diagnosis.  Plan; continue patient on Humira, cornerstones of IBD health maintenance checklist.  lab work is ordered for recheck recommend he continue vitamin B12 and multivitamin daily.  Follow-up in 6 months for recheck return office sooner if needed.     The following portions of the patient's history were reviewed and updated as appropriate:   Past Medical History:   Diagnosis Date   • Backache     of indeterminate etiology   • Cobalamin deficiency    • Crohn's disease (CMS/HCC)     With ileocolic stricture now resected   • Crohn's disease, small intestine (CMS/HCC)    • Drug therapy      long term   • GERD (gastroesophageal reflux disease)    • Partial obstruction of small intestine (CMS/HCC)    • Pharyngitis    • Portal hypertension (CMS/HCC)    • Portal vein thrombosis     And splenic vein   • Rhinitis    • Tobacco dependence syndrome     encouraged to stop   • Upper respiratory infection      Past Surgical History:   Procedure Laterality Date   • COLECTOMY PARTIAL / TOTAL      Resection of segment of sigmoid colon, resection of 45 cm of ileum, right colon, with ileotransverse colon anastomosis; resection of segment of bladder, suprapubic cystostomy. 03/28/1984       • COLONOSCOPY  07/02/2015   • COLONOSCOPY N/A 2/22/2019    Procedure: COLONOSCOPY;  Surgeon: Dewayne Baevers MD;  Location: [facility] ENDOSCOPY;  Service: Gastroenterology   • ENDOSCOPY      Prior resect of cecum & ascend colon found.[place] beyond strict has a normal appear.Stenosis of anastomosis, will not allow pass of scope.Linear ulcerat of anastomosis.Narrow strict & not long strict as reported by xray. Dilatation performed. 6/26/2014       • ENDOSCOPY      Normal hypopharynx, esophagus, dudoenum, symmetrical & patent pylorus. Hiatus hernia in GE junction. Mild non-erosive gastritis in antrum. Biopsy taken. 01/31/2013       • EXPLORATORY LAPAROTOMY      Exploratory laparotomy with adhesiolysis.Takedown of previous anastomosis.Resection of terminal ileum and proximal transverse colon w/a stapled CODY anastomosis between ileum and transverse colon. 03/07/2016       • SPLENECTOMY       Splenomegaly and hypersplenism 11/02/2007    • UPPER GASTROINTESTINAL ENDOSCOPY  01/31/2013     Family History   Problem Relation Age of Onset   • Cancer Other        Prior to Admission medications    Medication Sig Start Date End Date Taking? Authorizing Provider   adalimumab (HUMIRA) 40 MG/0.8ML Prefilled Syringe Kit injection Inject 40 mg under the skin every 14 (fourteen) days.   Yes Provider, MD Chandrika   aspirin 81 MG EC tablet Take 81 mg by  "mouth daily.   Yes Provider, MD Chandrika   omeprazole (priLOSEC) 20 MG capsule TAKE ONE CAPSULE BY MOUTH TWICE DAILY  Patient taking differently: Take 20 mg by mouth Daily. TAKE ONE CAPSULE BY MOUTH TWICE DAILY 1/18/19  Yes Torrie Paul APRN     No Known Allergies  Social History     Socioeconomic History   • Marital status:      Spouse name: Not on file   • Number of children: Not on file   • Years of education: Not on file   • Highest education level: Not on file   Tobacco Use   • Smoking status: Current Every Day Smoker     Packs/day: 1.00     Types: Cigarettes   • Smokeless tobacco: Never Used   Substance and Sexual Activity   • Alcohol use: No   • Drug use: No   • Sexual activity: Defer     Comment:        Review of Systems  Review of Systems   Constitutional: Negative for activity change, appetite change, chills, diaphoresis, fatigue, fever and unexpected weight change.   HENT: Negative for sore throat and trouble swallowing.    Respiratory: Negative for shortness of breath.    Gastrointestinal: Positive for abdominal pain (occasional cramping ) and diarrhea (5-6 per day ). Negative for abdominal distention, anal bleeding, blood in stool, constipation, nausea, rectal pain and vomiting.   Musculoskeletal: Negative for arthralgias.   Skin: Negative for pallor.   Neurological: Negative for light-headedness.        /88 (BP Location: Left arm)   Pulse 70   Ht 182.9 cm (72\")   Wt 88 kg (194 lb)   BMI 26.31 kg/m²     Objective    Physical Exam   Constitutional: He is oriented to person, place, and time. He appears well-developed and well-nourished. He is cooperative. No distress.   HENT:   Head: Normocephalic and atraumatic.   Neck: Normal range of motion. Neck supple. No thyromegaly present.   Cardiovascular: Normal rate, regular rhythm and normal heart sounds.   Pulmonary/Chest: Effort normal and breath sounds normal. He has no wheezes. He has no rhonchi. He has no rales.   Abdominal: " Soft. Normal appearance and bowel sounds are normal. He exhibits no distension, no fluid wave and no ascites. There is no hepatosplenomegaly. There is tenderness (reports about his normal tenderness ) in the right lower quadrant. There is no rigidity and no guarding. No hernia.   Lymphadenopathy:     He has no cervical adenopathy.   Neurological: He is alert and oriented to person, place, and time.   Skin: Skin is warm, dry and intact. No rash noted. No pallor.   Psychiatric: He has a normal mood and affect. His speech is normal.     Admission on 02/22/2019, Discharged on 02/22/2019   Component Date Value Ref Range Status   • Case Report 02/22/2019    Final                    Value:Surgical Pathology Report                         Case: [id number]                                  Authorizing Provider:  Dewayne Beavers MD        Collected:           02/22/2019 11:35 AM          Ordering Location:     [facility]             Received:            02/22/2019 12:01 PM                                 [facility]                                                     Pathologist:           Adarsh Shi MD                                                        Specimens:   1) - Small Intestine, Ileum, TI                                                                     2) - Large Intestine, Transverse Colon                                                              3) - Large Intestine, Left / Descending Colon, POLYP                                                4) - Large Intestine, Sigmoid Colon                                                       • Final Diagnosis 02/22/2019    Final                    Value:This result contains rich text formatting which cannot be displayed here.   • Gross Description 02/22/2019    Final                    Value:This result contains rich text formatting which cannot be displayed here.     Assessment/Plan      1. Crohn's disease of small and large intestines  with complication (CMS/HCC)    .       Orders placed during this encounter include:  Orders Placed This Encounter   Procedures   • Comprehensive Metabolic Panel     Standing Status:   Standing     Number of Occurrences:   1     Standing Expiration Date:   3/5/2020   • Vitamin B12   • QuantiFERON TB Gold   • Iron Profile   • CBC & Differential     Standing Status:   Standing     Number of Occurrences:   1     Standing Expiration Date:   3/5/2020     Order Specific Question:   Manual Differential     Answer:   No       * Surgery not found *    Review and/or summary of lab tests, radiology, procedures, medications. Review and summary of old records and obtaining of history. The risks and benefits of my recommendations, as well as other treatment options were discussed with the patient today. Questions were answered.    No orders of the defined types were placed in this encounter.      Follow-up: Return in about 6 months (around 9/5/2019).          This document has been electronically signed by VEENA Cantu on March 8, 2019 1:50 PM             Results for orders placed or performed in visit on 03/05/19   CBC Auto Differential   Result Value Ref Range    WBC 13.75 (H) 3.40 - 10.80 10*3/mm3    RBC 4.92 4.14 - 5.80 10*6/mm3    Hemoglobin 15.2 13.0 - 17.7 g/dL    Hematocrit 45.0 37.5 - 51.0 %    MCV 91.5 79.0 - 97.0 fL    MCH 30.9 26.6 - 33.0 pg    MCHC 33.8 31.5 - 35.7 g/dL    RDW 14.9 12.3 - 15.4 %    RDW-SD 50.3 37.0 - 54.0 fl    MPV 10.9 6.0 - 12.0 fL    Platelets 416 140 - 450 10*3/mm3    Neutrophil % 54.4 42.7 - 76.0 %    Lymphocyte % 33.6 19.6 - 45.3 %    Monocyte % 6.1 5.0 - 12.0 %    Eosinophil % 4.7 0.3 - 6.2 %    Basophil % 0.8 0.0 - 1.5 %    Immature Grans % 0.4 0.0 - 0.5 %    Neutrophils, Absolute 7.49 (H) 1.40 - 7.00 10*3/mm3    Lymphocytes, Absolute 4.62 (H) 0.70 - 3.10 10*3/mm3    Monocytes, Absolute 0.84 0.10 - 0.90 10*3/mm3    Eosinophils, Absolute 0.64 (H) 0.00 - 0.40 10*3/mm3    Basophils,  Absolute 0.11 0.00 - 0.20 10*3/mm3    Immature Grans, Absolute 0.05 0.00 - 0.05 10*3/mm3    nRBC 0.0 0.0 - 0.0 /100 WBC   Comprehensive Metabolic Panel   Result Value Ref Range    Glucose 92 60 - 100 mg/dL    BUN 7 7 - 21 mg/dL    Creatinine 0.81 0.70 - 1.30 mg/dL    Sodium 137 137 - 145 mmol/L    Potassium 3.7 3.5 - 5.1 mmol/L    Chloride 103 95 - 110 mmol/L    CO2 29.0 22.0 - 31.0 mmol/L    Calcium 9.1 8.4 - 10.2 mg/dL    Total Protein 7.5 6.3 - 8.6 g/dL    Albumin 4.10 3.40 - 4.80 g/dL    ALT (SGPT) 29 21 - 72 U/L    AST (SGOT) 31 17 - 59 U/L    Alkaline Phosphatase 78 38 - 126 U/L    Total Bilirubin 0.9 0.2 - 1.3 mg/dL    eGFR Non  Amer 100 56 - 130 mL/min/1.73    Globulin 3.4 2.3 - 3.5 gm/dL    A/G Ratio 1.2 1.1 - 1.8 g/dL    BUN/Creatinine Ratio 8.6 7.0 - 25.0    Anion Gap 5.0 5.0 - 15.0 mmol/L   Results for orders placed or performed in visit on 03/05/19   QuantiFERON TB Gold   Result Value Ref Range    QuantiFERON Incubation Incubation performed.     QuantiFERON Criteria Comment     QUANTIFERON TB1 AG VALUE 0.04 IU/mL    QUANTIFERON TB2 AG VALUE 0.03 IU/mL    QuantiFERON Nil Value 0.04 IU/mL    QuantiFERON Mitogen Value >10.00 IU/mL    QUANTIFERON-TB GOLD PLUS Negative Negative   Iron Profile   Result Value Ref Range    Iron 121 49 - 181 mcg/dL    TIBC 358 261 - 462 mcg/dL    Iron Saturation 34 20 - 55 %   Vitamin B12   Result Value Ref Range    Vitamin B-12 169 (L) 239 - 931 pg/mL   Results for orders placed or performed during the hospital encounter of 02/22/19   Tissue Pathology Exam   Result Value Ref Range    Case Report       Surgical Pathology Report                         Case: [id number]                                  Authorizing Provider:  Dewayne Beavers MD        Collected:           02/22/2019 11:35 AM          Ordering Location:     [facility]:            02/22/2019 12:01 PM                                 [facility]                                                      Pathologist:           Adarsh Shi MD                                                        Specimens:   1) - Small Intestine, Ileum, TI                                                                     2) - Large Intestine, Transverse Colon                                                              3) - Large Intestine, Left / Descending Colon, POLYP                                                4) - Large Intestine, Sigmoid Colon                                                        Final Diagnosis       1.  TERMINAL ILEUM, MUCOSAL BIOPSY:  NO SIGNIFICANT PATHOLOGIC DIAGNOSIS.    2.  TRANSVERSE COLON, MUCOSAL BIOPSY:  NO SIGNIFICANT PATHOLOGIC DIAGNOSIS.    3.  TUBULAR ADENOMA, DESCENDING COLON.    4.  SIGMOID COLON, MUCOSAL BIOPSY:  NO SIGNIFICANT PATHOLOGIC DIAGNOSIS.      Gross Description       In 4 containers, each of these show mucosal biopsies measuring up to 0.3 cm in greatest dimension.  Embedded accordingly.  1A terminal ileum; 2A transverse colon; 3A polyp, descending colon; 4A sigmoid colon.     Results for orders placed or performed in visit on 12/19/17   Reflexed QuantiFERON In   Result Value Ref Range    QuantiFERON-TB Gold In Tube Negative Negative    QuantiFERON Criteria Comment     QuantiFERON TB Ag Value 0.03 IU/mL    QuantiFERON Nil Value 0.03 IU/mL    QuantiFERON Mitogen Value >10.00 IU/mL    QFT TB Ag minus Nil Value 0.00 IU/mL    Interpretation Comment    QuantiFERON TB Gold   Result Value Ref Range    QuantiFERON Incubation Comment    CBC Auto Differential   Result Value Ref Range    WBC 10.27 (H) 3.20 - 9.80 10*3/mm3    RBC 4.65 4.37 - 5.74 10*6/mm3    Hemoglobin 14.9 13.7 - 17.3 g/dL    Hematocrit 42.8 39.0 - 49.0 %    MCV 92.0 80.0 - 98.0 fL    MCH 32.0 26.5 - 34.0 pg    MCHC 34.8 31.5 - 36.3 g/dL    RDW 14.6 (H) 11.5 - 14.5 %    RDW-SD 49.2 (H) 35.1 - 43.9 fl    MPV 10.4 8.0 - 12.0 fL    Platelets 373 150 - 450 10*3/mm3    Neutrophil % 47.0 37.0 -  80.0 %    Lymphocyte % 36.6 10.0 - 50.0 %    Monocyte % 11.0 0.0 - 12.0 %    Eosinophil % 4.7 0.0 - 7.0 %    Basophil % 0.5 0.0 - 2.0 %    Immature Grans % 0.2 0.0 - 0.5 %    Neutrophils, Absolute 4.83 2.00 - 8.60 10*3/mm3    Lymphocytes, Absolute 3.76 0.60 - 4.20 10*3/mm3    Monocytes, Absolute 1.13 (H) 0.00 - 0.90 10*3/mm3    Eosinophils, Absolute 0.48 0.00 - 0.70 10*3/mm3    Basophils, Absolute 0.05 0.00 - 0.20 10*3/mm3    Immature Grans, Absolute 0.02 0.00 - 0.02 10*3/mm3   Comprehensive Metabolic Panel   Result Value Ref Range    Glucose 98 60 - 100 mg/dL    BUN 7 7 - 21 mg/dL    Creatinine 0.96 0.70 - 1.30 mg/dL    Sodium 138 137 - 145 mmol/L    Potassium 3.6 3.5 - 5.1 mmol/L    Chloride 102 95 - 110 mmol/L    CO2 26.0 22.0 - 31.0 mmol/L    Calcium 9.1 8.4 - 10.2 mg/dL    Total Protein 8.0 6.3 - 8.6 g/dL    Albumin 4.30 3.40 - 4.80 g/dL    ALT (SGPT) 40 21 - 72 U/L    AST (SGOT) 37 17 - 59 U/L    Alkaline Phosphatase 70 38 - 126 U/L    Total Bilirubin 0.8 0.2 - 1.3 mg/dL    eGFR Non  Amer 83 56 - 130 mL/min/1.73    Globulin 3.7 (H) 2.3 - 3.5 gm/dL    A/G Ratio 1.2 1.1 - 1.8 g/dL    BUN/Creatinine Ratio 7.3 7.0 - 25.0    Anion Gap 10.0 5.0 - 15.0 mmol/L     *Note: Due to a large number of results and/or encounters for the requested time period, some results have not been displayed. A complete set of results can be found in Results Review.

## 2019-03-06 ENCOUNTER — TELEPHONE (OUTPATIENT)
Dept: GASTROENTEROLOGY | Facility: CLINIC | Age: 54
End: 2019-03-06

## 2019-03-08 LAB
QUANTIFERON CRITERIA: NORMAL
QUANTIFERON INCUBATION: NORMAL
QUANTIFERON MITOGEN VALUE: >10 IU/ML
QUANTIFERON NIL VALUE: 0.04 IU/ML
QUANTIFERON TB1 AG VALUE: 0.04 IU/ML
QUANTIFERON TB2 AG VALUE: 0.03 IU/ML
QUANTIFERON-TB GOLD PLUS: NEGATIVE

## 2019-09-05 ENCOUNTER — OFFICE VISIT (OUTPATIENT)
Dept: GASTROENTEROLOGY | Facility: CLINIC | Age: 54
End: 2019-09-05

## 2019-09-05 VITALS
SYSTOLIC BLOOD PRESSURE: 142 MMHG | BODY MASS INDEX: 25.95 KG/M2 | HEART RATE: 72 BPM | DIASTOLIC BLOOD PRESSURE: 82 MMHG | HEIGHT: 72 IN | WEIGHT: 191.6 LBS

## 2019-09-05 DIAGNOSIS — K50.819 CROHN'S DISEASE OF SMALL AND LARGE INTESTINES WITH COMPLICATION (HCC): Primary | ICD-10-CM

## 2019-09-05 PROCEDURE — 99213 OFFICE O/P EST LOW 20 MIN: CPT | Performed by: NURSE PRACTITIONER

## 2019-09-05 NOTE — PATIENT INSTRUCTIONS

## 2019-09-05 NOTE — PROGRESS NOTES
Chief Complaint   Patient presents with   • Crohn's Disease       Subjective    Jose Trent is a 53 y.o. male. he is here today for follow-up.    Crohn's Disease   This is a chronic problem. The current episode started more than 1 year ago. The problem occurs daily (stool about 5x per day ). The problem has been waxing and waning. Associated symptoms include abdominal pain and fatigue. Pertinent negatives include no anorexia, arthralgias, change in bowel habit, chest pain, chills, congestion, coughing, diaphoresis, fever, headaches, joint swelling, myalgias, nausea, neck pain or numbness.   Reports no flares since starting Humira overall has done very well Humira is provided per [place] support  Previous history copied forward- In 2016 he had exploratory laparotomy with lysis of adhesions takedown of a previous anastomosis and partial colon resection.  Splenectomy in 2007.  Colonoscopy Feb 2/2019 noted normal perianal digital rectal exam.  Evidence of prior end-to-end ileocolonic anastomosis in the ascending colon patent and characterized by healthy-appearing mucosa.  Localized area in the terminal ileum was mildly erythematous.   A small polyp was found and removed from descending colon.   Terminal ileum biopsy- no significant diagnosis.  Transverse colon biopsy- no significant pathological diagnosis.  Descending colon polyp was adenomatous. Random colonic biopsy in the sigmoid colon showed no significant pathologic diagnosis.  Continue with current regimen Humira every other week, Prilosec for gastritis and B12 once a month.  Follow-up in 6 months for recheck we will repeat labs at that time return office sooner if needed     The following portions of the patient's history were reviewed and updated as appropriate:   Past Medical History:   Diagnosis Date   • Backache     of indeterminate etiology   • Cobalamin deficiency    • Crohn's disease (CMS/HCC)     With ileocolic stricture now resected   •  Crohn's disease, small intestine (CMS/HCC)    • Drug therapy     long term   • GERD (gastroesophageal reflux disease)    • Partial obstruction of small intestine (CMS/HCC)    • Pharyngitis    • Portal hypertension (CMS/HCC)    • Portal vein thrombosis     And splenic vein   • Rhinitis    • Tobacco dependence syndrome     encouraged to stop   • Upper respiratory infection      Past Surgical History:   Procedure Laterality Date   • COLECTOMY PARTIAL / TOTAL      Resection of segment of sigmoid colon, resection of 45 cm of ileum, right colon, with ileotransverse colon anastomosis; resection of segment of bladder, suprapubic cystostomy. 03/28/1984       • COLONOSCOPY  07/02/2015   • COLONOSCOPY N/A 2/22/2019    Procedure: COLONOSCOPY;  Surgeon: Dewayne Beavers MD;  Location: [facility] ENDOSCOPY;  Service: Gastroenterology   • ENDOSCOPY      Prior resect of cecum & ascend colon found.[place] beyond strict has a normal appear.Stenosis of anastomosis, will not allow pass of scope.Linear ulcerat of anastomosis.Narrow strict & not long strict as reported by xray. Dilatation performed. 6/26/2014       • ENDOSCOPY      Normal hypopharynx, esophagus, dudoenum, symmetrical & patent pylorus. Hiatus hernia in GE junction. Mild non-erosive gastritis in antrum. Biopsy taken. 01/31/2013       • EXPLORATORY LAPAROTOMY      Exploratory laparotomy with adhesiolysis.Takedown of previous anastomosis.Resection of terminal ileum and proximal transverse colon w/a stapled CODY anastomosis between ileum and transverse colon. 03/07/2016       • SPLENECTOMY       Splenomegaly and hypersplenism 11/02/2007    • UPPER GASTROINTESTINAL ENDOSCOPY  01/31/2013     Family History   Problem Relation Age of Onset   • Cancer Other        Prior to Admission medications    Medication Sig Start Date End Date Taking? Authorizing Provider   adalimumab (HUMIRA) 40 MG/0.8ML Prefilled Syringe Kit injection Inject 40 mg under the skin every 14 (fourteen) days.   Yes  "Provider, MD Chandrika   aspirin 81 MG EC tablet Take 81 mg by mouth daily.   Yes ProviderChandrika MD   omeprazole (priLOSEC) 20 MG capsule TAKE ONE CAPSULE BY MOUTH TWICE DAILY  Patient taking differently: Take 20 mg by mouth Daily. TAKE ONE CAPSULE BY MOUTH TWICE DAILY 1/18/19  Yes Torrie Paul APRN     No Known Allergies  Social History     Socioeconomic History   • Marital status:      Spouse name: Not on file   • Number of children: Not on file   • Years of education: Not on file   • Highest education level: Not on file   Tobacco Use   • Smoking status: Current Every Day Smoker     Packs/day: 1.00     Types: Cigarettes   • Smokeless tobacco: Never Used   Substance and Sexual Activity   • Alcohol use: No   • Drug use: No   • Sexual activity: Defer     Comment:        Review of Systems  Review of Systems   Constitutional: Positive for fatigue. Negative for chills, diaphoresis and fever.   HENT: Negative for congestion.    Respiratory: Negative for cough.    Cardiovascular: Negative for chest pain.   Gastrointestinal: Positive for abdominal pain. Negative for anorexia, change in bowel habit and nausea.   Musculoskeletal: Negative for arthralgias, joint swelling, myalgias and neck pain.   Neurological: Negative for numbness and headaches.        /82 (BP Location: Left arm)   Pulse 72   Ht 182.9 cm (72\")   Wt 86.9 kg (191 lb 9.6 oz)   BMI 25.99 kg/m²     Objective    Physical Exam   Constitutional: He is oriented to person, place, and time. He appears well-developed and well-nourished. He is cooperative. No distress.   HENT:   Head: Normocephalic and atraumatic.   Neck: Normal range of motion. Neck supple. No thyromegaly present.   Cardiovascular: Normal rate, regular rhythm and normal heart sounds.   Pulmonary/Chest: Effort normal and breath sounds normal. He has no wheezes. He has no rhonchi. He has no rales.   Abdominal: Soft. Normal appearance and bowel sounds are normal. He " exhibits no distension. There is no hepatosplenomegaly. There is tenderness in the right lower quadrant, left upper quadrant and left lower quadrant. There is no rigidity and no guarding. No hernia.   Lymphadenopathy:     He has no cervical adenopathy.   Neurological: He is alert and oriented to person, place, and time.   Skin: Skin is warm, dry and intact. No rash noted. No pallor.   Psychiatric: He has a normal mood and affect. His speech is normal.     Lab on 03/05/2019   Component Date Value Ref Range Status   • Glucose 03/05/2019 92  60 - 100 mg/dL Final   • BUN 03/05/2019 7  7 - 21 mg/dL Final   • Creatinine 03/05/2019 0.81  0.70 - 1.30 mg/dL Final   • Sodium 03/05/2019 137  137 - 145 mmol/L Final   • Potassium 03/05/2019 3.7  3.5 - 5.1 mmol/L Final   • Chloride 03/05/2019 103  95 - 110 mmol/L Final   • CO2 03/05/2019 29.0  22.0 - 31.0 mmol/L Final   • Calcium 03/05/2019 9.1  8.4 - 10.2 mg/dL Final   • Total Protein 03/05/2019 7.5  6.3 - 8.6 g/dL Final   • Albumin 03/05/2019 4.10  3.40 - 4.80 g/dL Final   • ALT (SGPT) 03/05/2019 29  21 - 72 U/L Final   • AST (SGOT) 03/05/2019 31  17 - 59 U/L Final   • Alkaline Phosphatase 03/05/2019 78  38 - 126 U/L Final   • Total Bilirubin 03/05/2019 0.9  0.2 - 1.3 mg/dL Final   • eGFR Non African Amer 03/05/2019 100  56 - 130 mL/min/1.73 Final   • Globulin 03/05/2019 3.4  2.3 - 3.5 gm/dL Final   • A/G Ratio 03/05/2019 1.2  1.1 - 1.8 g/dL Final   • BUN/Creatinine Ratio 03/05/2019 8.6  7.0 - 25.0 Final   • Anion Gap 03/05/2019 5.0  5.0 - 15.0 mmol/L Final   • WBC 03/05/2019 13.75* 3.40 - 10.80 10*3/mm3 Final   • RBC 03/05/2019 4.92  4.14 - 5.80 10*6/mm3 Final   • Hemoglobin 03/05/2019 15.2  13.0 - 17.7 g/dL Final   • Hematocrit 03/05/2019 45.0  37.5 - 51.0 % Final   • MCV 03/05/2019 91.5  79.0 - 97.0 fL Final   • MCH 03/05/2019 30.9  26.6 - 33.0 pg Final   • MCHC 03/05/2019 33.8  31.5 - 35.7 g/dL Final   • RDW 03/05/2019 14.9  12.3 - 15.4 % Final   • RDW-SD 03/05/2019 50.3   37.0 - 54.0 fl Final   • MPV 03/05/2019 10.9  6.0 - 12.0 fL Final   • Platelets 03/05/2019 416  140 - 450 10*3/mm3 Final   • Neutrophil % 03/05/2019 54.4  42.7 - 76.0 % Final   • Lymphocyte % 03/05/2019 33.6  19.6 - 45.3 % Final   • Monocyte % 03/05/2019 6.1  5.0 - 12.0 % Final   • Eosinophil % 03/05/2019 4.7  0.3 - 6.2 % Final   • Basophil % 03/05/2019 0.8  0.0 - 1.5 % Final   • Immature Grans % 03/05/2019 0.4  0.0 - 0.5 % Final   • Neutrophils, Absolute 03/05/2019 7.49* 1.40 - 7.00 10*3/mm3 Final   • Lymphocytes, Absolute 03/05/2019 4.62* 0.70 - 3.10 10*3/mm3 Final   • Monocytes, Absolute 03/05/2019 0.84  0.10 - 0.90 10*3/mm3 Final   • Eosinophils, Absolute 03/05/2019 0.64* 0.00 - 0.40 10*3/mm3 Final   • Basophils, Absolute 03/05/2019 0.11  0.00 - 0.20 10*3/mm3 Final   • Immature Grans, Absolute 03/05/2019 0.05  0.00 - 0.05 10*3/mm3 Final   • nRBC 03/05/2019 0.0  0.0 - 0.0 /100 WBC Final     Assessment/Plan      1. Crohn's disease of small and large intestines with complication (CMS/HCC)    .       Orders placed during this encounter include:  No orders of the defined types were placed in this encounter.      * Surgery not found *    Review and/or summary of lab tests, radiology, procedures, medications. Review and summary of old records and obtaining of history. The risks and benefits of my recommendations, as well as other treatment options were discussed with the patient today. Questions were answered.    No orders of the defined types were placed in this encounter.      Follow-up: Return in about 6 months (around 3/5/2020).          This document has been electronically signed by VEENA Cantu on September 5, 2019 9:48 AM             Results for orders placed or performed in visit on 03/05/19   CBC Auto Differential   Result Value Ref Range    WBC 13.75 (H) 3.40 - 10.80 10*3/mm3    RBC 4.92 4.14 - 5.80 10*6/mm3    Hemoglobin 15.2 13.0 - 17.7 g/dL    Hematocrit 45.0 37.5 - 51.0 %    MCV 91.5 79.0 - 97.0 fL     MCH 30.9 26.6 - 33.0 pg    MCHC 33.8 31.5 - 35.7 g/dL    RDW 14.9 12.3 - 15.4 %    RDW-SD 50.3 37.0 - 54.0 fl    MPV 10.9 6.0 - 12.0 fL    Platelets 416 140 - 450 10*3/mm3    Neutrophil % 54.4 42.7 - 76.0 %    Lymphocyte % 33.6 19.6 - 45.3 %    Monocyte % 6.1 5.0 - 12.0 %    Eosinophil % 4.7 0.3 - 6.2 %    Basophil % 0.8 0.0 - 1.5 %    Immature Grans % 0.4 0.0 - 0.5 %    Neutrophils, Absolute 7.49 (H) 1.40 - 7.00 10*3/mm3    Lymphocytes, Absolute 4.62 (H) 0.70 - 3.10 10*3/mm3    Monocytes, Absolute 0.84 0.10 - 0.90 10*3/mm3    Eosinophils, Absolute 0.64 (H) 0.00 - 0.40 10*3/mm3    Basophils, Absolute 0.11 0.00 - 0.20 10*3/mm3    Immature Grans, Absolute 0.05 0.00 - 0.05 10*3/mm3    nRBC 0.0 0.0 - 0.0 /100 WBC   Comprehensive Metabolic Panel   Result Value Ref Range    Glucose 92 60 - 100 mg/dL    BUN 7 7 - 21 mg/dL    Creatinine 0.81 0.70 - 1.30 mg/dL    Sodium 137 137 - 145 mmol/L    Potassium 3.7 3.5 - 5.1 mmol/L    Chloride 103 95 - 110 mmol/L    CO2 29.0 22.0 - 31.0 mmol/L    Calcium 9.1 8.4 - 10.2 mg/dL    Total Protein 7.5 6.3 - 8.6 g/dL    Albumin 4.10 3.40 - 4.80 g/dL    ALT (SGPT) 29 21 - 72 U/L    AST (SGOT) 31 17 - 59 U/L    Alkaline Phosphatase 78 38 - 126 U/L    Total Bilirubin 0.9 0.2 - 1.3 mg/dL    eGFR Non  Amer 100 56 - 130 mL/min/1.73    Globulin 3.4 2.3 - 3.5 gm/dL    A/G Ratio 1.2 1.1 - 1.8 g/dL    BUN/Creatinine Ratio 8.6 7.0 - 25.0    Anion Gap 5.0 5.0 - 15.0 mmol/L   Results for orders placed or performed in visit on 03/05/19   QuantiFERON TB Gold   Result Value Ref Range    QuantiFERON Incubation Incubation performed.     QuantiFERON Criteria Comment     QUANTIFERON TB1 AG VALUE 0.04 IU/mL    QUANTIFERON TB2 AG VALUE 0.03 IU/mL    QuantiFERON Nil Value 0.04 IU/mL    QuantiFERON Mitogen Value >10.00 IU/mL    QUANTIFERON-TB GOLD PLUS Negative Negative   Iron Profile   Result Value Ref Range    Iron 121 49 - 181 mcg/dL    TIBC 358 261 - 462 mcg/dL    Iron Saturation 34 20 - 55 %    Vitamin B12   Result Value Ref Range    Vitamin B-12 169 (L) 239 - 931 pg/mL   Results for orders placed or performed during the hospital encounter of 02/22/19   Tissue Pathology Exam   Result Value Ref Range    Case Report       Surgical Pathology Report                         Case: [id number]                                  Authorizing Provider:  Dewayne Beavers MD        Collected:           02/22/2019 11:35 AM          Ordering Location:     [facility]             Received:            02/22/2019 12:01 PM                                 [facility]                                                     Pathologist:           Adarsh Shi MD                                                        Specimens:   1) - Small Intestine, Ileum, TI                                                                     2) - Large Intestine, Transverse Colon                                                              3) - Large Intestine, Left / Descending Colon, POLYP                                                4) - Large Intestine, Sigmoid Colon                                                        Final Diagnosis       1.  TERMINAL ILEUM, MUCOSAL BIOPSY:  NO SIGNIFICANT PATHOLOGIC DIAGNOSIS.    2.  TRANSVERSE COLON, MUCOSAL BIOPSY:  NO SIGNIFICANT PATHOLOGIC DIAGNOSIS.    3.  TUBULAR ADENOMA, DESCENDING COLON.    4.  SIGMOID COLON, MUCOSAL BIOPSY:  NO SIGNIFICANT PATHOLOGIC DIAGNOSIS.      Gross Description       In 4 containers, each of these show mucosal biopsies measuring up to 0.3 cm in greatest dimension.  Embedded accordingly.  1A terminal ileum; 2A transverse colon; 3A polyp, descending colon; 4A sigmoid colon.     Results for orders placed or performed in visit on 12/19/17   Reflexed QuantiFERON In   Result Value Ref Range    QuantiFERON-TB Gold In Tube Negative Negative    QuantiFERON Criteria Comment     QuantiFERON TB Ag Value 0.03 IU/mL    QuantiFERON Nil Value 0.03 IU/mL     QuantiFERON Mitogen Value >10.00 IU/mL    QFT TB Ag minus Nil Value 0.00 IU/mL    Interpretation Comment    QuantiFERON TB Gold   Result Value Ref Range    QuantiFERON Incubation Comment    CBC Auto Differential   Result Value Ref Range    WBC 10.27 (H) 3.20 - 9.80 10*3/mm3    RBC 4.65 4.37 - 5.74 10*6/mm3    Hemoglobin 14.9 13.7 - 17.3 g/dL    Hematocrit 42.8 39.0 - 49.0 %    MCV 92.0 80.0 - 98.0 fL    MCH 32.0 26.5 - 34.0 pg    MCHC 34.8 31.5 - 36.3 g/dL    RDW 14.6 (H) 11.5 - 14.5 %    RDW-SD 49.2 (H) 35.1 - 43.9 fl    MPV 10.4 8.0 - 12.0 fL    Platelets 373 150 - 450 10*3/mm3    Neutrophil % 47.0 37.0 - 80.0 %    Lymphocyte % 36.6 10.0 - 50.0 %    Monocyte % 11.0 0.0 - 12.0 %    Eosinophil % 4.7 0.0 - 7.0 %    Basophil % 0.5 0.0 - 2.0 %    Immature Grans % 0.2 0.0 - 0.5 %    Neutrophils, Absolute 4.83 2.00 - 8.60 10*3/mm3    Lymphocytes, Absolute 3.76 0.60 - 4.20 10*3/mm3    Monocytes, Absolute 1.13 (H) 0.00 - 0.90 10*3/mm3    Eosinophils, Absolute 0.48 0.00 - 0.70 10*3/mm3    Basophils, Absolute 0.05 0.00 - 0.20 10*3/mm3    Immature Grans, Absolute 0.02 0.00 - 0.02 10*3/mm3   Comprehensive Metabolic Panel   Result Value Ref Range    Glucose 98 60 - 100 mg/dL    BUN 7 7 - 21 mg/dL    Creatinine 0.96 0.70 - 1.30 mg/dL    Sodium 138 137 - 145 mmol/L    Potassium 3.6 3.5 - 5.1 mmol/L    Chloride 102 95 - 110 mmol/L    CO2 26.0 22.0 - 31.0 mmol/L    Calcium 9.1 8.4 - 10.2 mg/dL    Total Protein 8.0 6.3 - 8.6 g/dL    Albumin 4.30 3.40 - 4.80 g/dL    ALT (SGPT) 40 21 - 72 U/L    AST (SGOT) 37 17 - 59 U/L    Alkaline Phosphatase 70 38 - 126 U/L    Total Bilirubin 0.8 0.2 - 1.3 mg/dL    eGFR Non  Amer 83 56 - 130 mL/min/1.73    Globulin 3.7 (H) 2.3 - 3.5 gm/dL    A/G Ratio 1.2 1.1 - 1.8 g/dL    BUN/Creatinine Ratio 7.3 7.0 - 25.0    Anion Gap 10.0 5.0 - 15.0 mmol/L     *Note: Due to a large number of results and/or encounters for the requested time period, some results have not been displayed. A complete set of  results can be found in Results Review.

## 2020-01-22 DIAGNOSIS — K21.9 GASTROESOPHAGEAL REFLUX DISEASE WITHOUT ESOPHAGITIS: ICD-10-CM

## 2020-01-27 RX ORDER — OMEPRAZOLE 20 MG/1
CAPSULE, DELAYED RELEASE ORAL
Qty: 60 CAPSULE | Refills: 5 | Status: SHIPPED | OUTPATIENT
Start: 2020-01-27 | End: 2020-10-16 | Stop reason: SDUPTHER

## 2020-03-05 ENCOUNTER — OFFICE VISIT (OUTPATIENT)
Dept: GASTROENTEROLOGY | Facility: CLINIC | Age: 55
End: 2020-03-05

## 2020-03-05 ENCOUNTER — APPOINTMENT (OUTPATIENT)
Dept: LAB | Facility: HOSPITAL | Age: 55
End: 2020-03-05

## 2020-03-05 VITALS
HEIGHT: 72 IN | WEIGHT: 187.2 LBS | BODY MASS INDEX: 25.35 KG/M2 | DIASTOLIC BLOOD PRESSURE: 85 MMHG | HEART RATE: 82 BPM | SYSTOLIC BLOOD PRESSURE: 134 MMHG

## 2020-03-05 DIAGNOSIS — K21.9 GASTROESOPHAGEAL REFLUX DISEASE, ESOPHAGITIS PRESENCE NOT SPECIFIED: ICD-10-CM

## 2020-03-05 DIAGNOSIS — K50.819 CROHN'S DISEASE OF SMALL AND LARGE INTESTINES WITH COMPLICATION (HCC): Primary | ICD-10-CM

## 2020-03-05 PROCEDURE — 36415 COLL VENOUS BLD VENIPUNCTURE: CPT | Performed by: NURSE PRACTITIONER

## 2020-03-05 PROCEDURE — 83540 ASSAY OF IRON: CPT | Performed by: NURSE PRACTITIONER

## 2020-03-05 PROCEDURE — 99213 OFFICE O/P EST LOW 20 MIN: CPT | Performed by: NURSE PRACTITIONER

## 2020-03-05 PROCEDURE — 82607 VITAMIN B-12: CPT | Performed by: NURSE PRACTITIONER

## 2020-03-05 PROCEDURE — 85027 COMPLETE CBC AUTOMATED: CPT | Performed by: NURSE PRACTITIONER

## 2020-03-05 PROCEDURE — 84466 ASSAY OF TRANSFERRIN: CPT | Performed by: NURSE PRACTITIONER

## 2020-03-05 PROCEDURE — 86480 TB TEST CELL IMMUN MEASURE: CPT

## 2020-03-05 RX ORDER — SODIUM, POTASSIUM,MAG SULFATES 17.5-3.13G
1 SOLUTION, RECONSTITUTED, ORAL ORAL EVERY 12 HOURS
Qty: 2 BOTTLE | Refills: 0 | Status: SHIPPED | OUTPATIENT
Start: 2020-03-05 | End: 2020-09-08

## 2020-03-05 RX ORDER — DEXTROSE AND SODIUM CHLORIDE 5; .45 G/100ML; G/100ML
30 INJECTION, SOLUTION INTRAVENOUS CONTINUOUS PRN
Status: CANCELLED | OUTPATIENT
Start: 2020-09-23

## 2020-03-05 RX ORDER — SODIUM CHLORIDE 0.9 % (FLUSH) 0.9 %
10 SYRINGE (ML) INJECTION AS NEEDED
Status: CANCELLED | OUTPATIENT
Start: 2020-09-23

## 2020-03-05 RX ORDER — SODIUM CHLORIDE 0.9 % (FLUSH) 0.9 %
3 SYRINGE (ML) INJECTION EVERY 12 HOURS SCHEDULED
Status: CANCELLED | OUTPATIENT
Start: 2020-09-23

## 2020-03-05 NOTE — PROGRESS NOTES
Chief Complaint   Patient presents with   • Crohn's Disease       Subjective    Jose Trent is a 54 y.o. male. he is here today for follow-up.    History of Present Illness  54-year-old male with Crohn disease presents for follow-up.  He has done well in the last 6 months denies any flare of symptoms denies any abdominal pain, changes in bowel habits or blood within his stool.  States he still has diarrhea about 5-10 times per day and denies any nocturnal symptoms.  Denies any mucus within the stool most recent colonoscopy 2/22/2019 noted no active disease with negative biopsies.  There was localized area in the terminal ileum that was mildly erythematous.  A small polyp was found and removed from the descending colon which was adenomatous.  He has chronic reflux controlled with omeprazole daily.  He remains on B12 due to vitamin B12 deficiency he gets it once a month at the clinic agreeable.  Schedule patient for colonoscopy for surveillance of Crohn disease and history of colonic polyp of descending colon.     The following portions of the patient's history were reviewed and updated as appropriate:   Past Medical History:   Diagnosis Date   • Backache     of indeterminate etiology   • Cobalamin deficiency    • Crohn's disease (CMS/HCC)     With ileocolic stricture now resected   • Crohn's disease, small intestine (CMS/HCC)    • Drug therapy     long term   • GERD (gastroesophageal reflux disease)    • Partial obstruction of small intestine (CMS/HCC)    • Pharyngitis    • Portal hypertension (CMS/HCC)    • Portal vein thrombosis     And splenic vein   • Rhinitis    • Tobacco dependence syndrome     encouraged to stop   • Upper respiratory infection      Past Surgical History:   Procedure Laterality Date   • COLECTOMY PARTIAL / TOTAL      Resection of segment of sigmoid colon, resection of 45 cm of ileum, right colon, with ileotransverse colon anastomosis; resection of segment of bladder, suprapubic  cystostomy. 03/28/1984       • COLONOSCOPY  07/02/2015   • COLONOSCOPY N/A 2/22/2019    Procedure: COLONOSCOPY;  Surgeon: Dewayne Beavers MD;  Location: [facility] ENDOSCOPY;  Service: Gastroenterology   • ENDOSCOPY      Prior resect of cecum & ascend colon found.[place] beyond strict has a normal appear.Stenosis of anastomosis, will not allow pass of scope.Linear ulcerat of anastomosis.Narrow strict & not long strict as reported by xray. Dilatation performed. 6/26/2014       • ENDOSCOPY      Normal hypopharynx, esophagus, dudoenum, symmetrical & patent pylorus. Hiatus hernia in GE junction. Mild non-erosive gastritis in antrum. Biopsy taken. 01/31/2013       • EXPLORATORY LAPAROTOMY      Exploratory laparotomy with adhesiolysis.Takedown of previous anastomosis.Resection of terminal ileum and proximal transverse colon w/a stapled CODY anastomosis between ileum and transverse colon. 03/07/2016       • SPLENECTOMY       Splenomegaly and hypersplenism 11/02/2007    • UPPER GASTROINTESTINAL ENDOSCOPY  01/31/2013     Family History   Problem Relation Age of Onset   • Cancer Other        Prior to Admission medications    Medication Sig Start Date End Date Taking? Authorizing Provider   adalimumab (HUMIRA) 40 MG/0.8ML Prefilled Syringe Kit injection Inject 40 mg under the skin every 14 (fourteen) days.   Yes ProviderChandrika MD   aspirin 81 MG EC tablet Take 81 mg by mouth daily.   Yes ProviderChandrika MD   omeprazole (priLOSEC) 20 MG capsule TAKE ONE CAPSULE BY MOUTH TWICE DAILY 1/27/20  Yes Torrie Paul APRN     No Known Allergies  Social History     Socioeconomic History   • Marital status:      Spouse name: Not on file   • Number of children: Not on file   • Years of education: Not on file   • Highest education level: Not on file   Tobacco Use   • Smoking status: Current Every Day Smoker     Packs/day: 1.00     Types: Cigarettes   • Smokeless tobacco: Never Used   Substance and Sexual Activity   • Alcohol  "use: No   • Drug use: No   • Sexual activity: Defer     Comment:        Review of Systems  Review of Systems   Constitutional: Positive for fatigue. Negative for activity change, appetite change, chills, diaphoresis, fever and unexpected weight change.   HENT: Negative for sore throat and trouble swallowing.    Respiratory: Negative for shortness of breath.    Gastrointestinal: Positive for diarrhea (5-10per day no nocturnal ). Negative for abdominal distention, abdominal pain, anal bleeding, blood in stool, constipation, nausea, rectal pain and vomiting.   Musculoskeletal: Negative for arthralgias.   Skin: Negative for pallor.   Neurological: Negative for light-headedness.        /85 (BP Location: Left arm)   Pulse 82   Ht 182.9 cm (72\")   Wt 84.9 kg (187 lb 3.2 oz)   BMI 25.39 kg/m²     Objective    Physical Exam   Abdominal: There is tenderness in the right lower quadrant and left lower quadrant.     Lab on 03/05/2019   Component Date Value Ref Range Status   • Glucose 03/05/2019 92  60 - 100 mg/dL Final   • BUN 03/05/2019 7  7 - 21 mg/dL Final   • Creatinine 03/05/2019 0.81  0.70 - 1.30 mg/dL Final   • Sodium 03/05/2019 137  137 - 145 mmol/L Final   • Potassium 03/05/2019 3.7  3.5 - 5.1 mmol/L Final   • Chloride 03/05/2019 103  95 - 110 mmol/L Final   • CO2 03/05/2019 29.0  22.0 - 31.0 mmol/L Final   • Calcium 03/05/2019 9.1  8.4 - 10.2 mg/dL Final   • Total Protein 03/05/2019 7.5  6.3 - 8.6 g/dL Final   • Albumin 03/05/2019 4.10  3.40 - 4.80 g/dL Final   • ALT (SGPT) 03/05/2019 29  21 - 72 U/L Final   • AST (SGOT) 03/05/2019 31  17 - 59 U/L Final   • Alkaline Phosphatase 03/05/2019 78  38 - 126 U/L Final   • Total Bilirubin 03/05/2019 0.9  0.2 - 1.3 mg/dL Final   • eGFR Non African Amer 03/05/2019 100  56 - 130 mL/min/1.73 Final   • Globulin 03/05/2019 3.4  2.3 - 3.5 gm/dL Final   • A/G Ratio 03/05/2019 1.2  1.1 - 1.8 g/dL Final   • BUN/Creatinine Ratio 03/05/2019 8.6  7.0 - 25.0 Final   • " Anion Gap 03/05/2019 5.0  5.0 - 15.0 mmol/L Final   • WBC 03/05/2019 13.75* 3.40 - 10.80 10*3/mm3 Final   • RBC 03/05/2019 4.92  4.14 - 5.80 10*6/mm3 Final   • Hemoglobin 03/05/2019 15.2  13.0 - 17.7 g/dL Final   • Hematocrit 03/05/2019 45.0  37.5 - 51.0 % Final   • MCV 03/05/2019 91.5  79.0 - 97.0 fL Final   • MCH 03/05/2019 30.9  26.6 - 33.0 pg Final   • MCHC 03/05/2019 33.8  31.5 - 35.7 g/dL Final   • RDW 03/05/2019 14.9  12.3 - 15.4 % Final   • RDW-SD 03/05/2019 50.3  37.0 - 54.0 fl Final   • MPV 03/05/2019 10.9  6.0 - 12.0 fL Final   • Platelets 03/05/2019 416  140 - 450 10*3/mm3 Final   • Neutrophil % 03/05/2019 54.4  42.7 - 76.0 % Final   • Lymphocyte % 03/05/2019 33.6  19.6 - 45.3 % Final   • Monocyte % 03/05/2019 6.1  5.0 - 12.0 % Final   • Eosinophil % 03/05/2019 4.7  0.3 - 6.2 % Final   • Basophil % 03/05/2019 0.8  0.0 - 1.5 % Final   • Immature Grans % 03/05/2019 0.4  0.0 - 0.5 % Final   • Neutrophils, Absolute 03/05/2019 7.49* 1.40 - 7.00 10*3/mm3 Final   • Lymphocytes, Absolute 03/05/2019 4.62* 0.70 - 3.10 10*3/mm3 Final   • Monocytes, Absolute 03/05/2019 0.84  0.10 - 0.90 10*3/mm3 Final   • Eosinophils, Absolute 03/05/2019 0.64* 0.00 - 0.40 10*3/mm3 Final   • Basophils, Absolute 03/05/2019 0.11  0.00 - 0.20 10*3/mm3 Final   • Immature Grans, Absolute 03/05/2019 0.05  0.00 - 0.05 10*3/mm3 Final   • nRBC 03/05/2019 0.0  0.0 - 0.0 /100 WBC Final     Assessment/Plan      1. Crohn's disease of small and large intestines with complication (CMS/HCC)    2. Gastroesophageal reflux disease, esophagitis presence not specified    .   Colonoscopy for surveillance of Crohn disease will check lab work today continue on current therapy Humira for Crohn's disease omeprazole for reflux vitamin B12 injection monthly for B12 deficiency.    Orders placed during this encounter include:  Orders Placed This Encounter   Procedures   • CBC (No Diff)   • Iron Profile   • Vitamin B12   • Follow Anesthesia Guidelines / Standing  Orders     Standing Status:   Future   • Obtain Informed Consent     Standing Status:   Future     Order Specific Question:   Informed Consent Given For     Answer:   COLONOSCOPY       COLONOSCOPY WED (N/A)    Review and/or summary of lab tests, radiology, procedures, medications. Review and summary of old records and obtaining of history. The risks and benefits of my recommendations, as well as other treatment options were discussed with the patient today. Questions were answered.    New Medications Ordered This Visit   Medications   • sodium-potassium-magnesium sulfates (SUPREP BOWEL PREP KIT) 17.5-3.13-1.6 GM/177ML solution oral solution     Sig: Take 1 bottle by mouth Every 12 (Twelve) Hours.     Dispense:  2 bottle     Refill:  0       Follow-up: Return in about 6 months (around 9/5/2020).          This document has been electronically signed by VEENA Cantu on March 5, 2020 4:46 PM             Results for orders placed or performed in visit on 03/05/19   CBC Auto Differential   Result Value Ref Range    WBC 13.75 (H) 3.40 - 10.80 10*3/mm3    RBC 4.92 4.14 - 5.80 10*6/mm3    Hemoglobin 15.2 13.0 - 17.7 g/dL    Hematocrit 45.0 37.5 - 51.0 %    MCV 91.5 79.0 - 97.0 fL    MCH 30.9 26.6 - 33.0 pg    MCHC 33.8 31.5 - 35.7 g/dL    RDW 14.9 12.3 - 15.4 %    RDW-SD 50.3 37.0 - 54.0 fl    MPV 10.9 6.0 - 12.0 fL    Platelets 416 140 - 450 10*3/mm3    Neutrophil % 54.4 42.7 - 76.0 %    Lymphocyte % 33.6 19.6 - 45.3 %    Monocyte % 6.1 5.0 - 12.0 %    Eosinophil % 4.7 0.3 - 6.2 %    Basophil % 0.8 0.0 - 1.5 %    Immature Grans % 0.4 0.0 - 0.5 %    Neutrophils, Absolute 7.49 (H) 1.40 - 7.00 10*3/mm3    Lymphocytes, Absolute 4.62 (H) 0.70 - 3.10 10*3/mm3    Monocytes, Absolute 0.84 0.10 - 0.90 10*3/mm3    Eosinophils, Absolute 0.64 (H) 0.00 - 0.40 10*3/mm3    Basophils, Absolute 0.11 0.00 - 0.20 10*3/mm3    Immature Grans, Absolute 0.05 0.00 - 0.05 10*3/mm3    nRBC 0.0 0.0 - 0.0 /100 WBC   Comprehensive Metabolic Panel    Result Value Ref Range    Glucose 92 60 - 100 mg/dL    BUN 7 7 - 21 mg/dL    Creatinine 0.81 0.70 - 1.30 mg/dL    Sodium 137 137 - 145 mmol/L    Potassium 3.7 3.5 - 5.1 mmol/L    Chloride 103 95 - 110 mmol/L    CO2 29.0 22.0 - 31.0 mmol/L    Calcium 9.1 8.4 - 10.2 mg/dL    Total Protein 7.5 6.3 - 8.6 g/dL    Albumin 4.10 3.40 - 4.80 g/dL    ALT (SGPT) 29 21 - 72 U/L    AST (SGOT) 31 17 - 59 U/L    Alkaline Phosphatase 78 38 - 126 U/L    Total Bilirubin 0.9 0.2 - 1.3 mg/dL    eGFR Non  Amer 100 56 - 130 mL/min/1.73    Globulin 3.4 2.3 - 3.5 gm/dL    A/G Ratio 1.2 1.1 - 1.8 g/dL    BUN/Creatinine Ratio 8.6 7.0 - 25.0    Anion Gap 5.0 5.0 - 15.0 mmol/L   Results for orders placed or performed in visit on 03/05/19   QuantiFERON TB Gold   Result Value Ref Range    QuantiFERON Incubation Incubation performed.     QuantiFERON Criteria Comment     QUANTIFERON TB1 AG VALUE 0.04 IU/mL    QUANTIFERON TB2 AG VALUE 0.03 IU/mL    QuantiFERON Nil Value 0.04 IU/mL    QuantiFERON Mitogen Value >10.00 IU/mL    QUANTIFERON-TB GOLD PLUS Negative Negative   Iron Profile   Result Value Ref Range    Iron 121 49 - 181 mcg/dL    TIBC 358 261 - 462 mcg/dL    Iron Saturation 34 20 - 55 %   Vitamin B12   Result Value Ref Range    Vitamin B-12 169 (L) 239 - 931 pg/mL   Results for orders placed or performed during the hospital encounter of 02/22/19   Tissue Pathology Exam   Result Value Ref Range    Case Report       Surgical Pathology Report                         Case: [id number]                                  Authorizing Provider:  Dewayne Beavers MD        Collected:           02/22/2019 11:35 AM          Ordering Location:     [facility]             Received:            02/22/2019 12:01 PM                                 [facility]                                                     Pathologist:           Adarsh Shi MD                                                        Specimens:   1) - Small  Intestine, Ileum, TI                                                                     2) - Large Intestine, Transverse Colon                                                              3) - Large Intestine, Left / Descending Colon, POLYP                                                4) - Large Intestine, Sigmoid Colon                                                        Final Diagnosis       1.  TERMINAL ILEUM, MUCOSAL BIOPSY:  NO SIGNIFICANT PATHOLOGIC DIAGNOSIS.    2.  TRANSVERSE COLON, MUCOSAL BIOPSY:  NO SIGNIFICANT PATHOLOGIC DIAGNOSIS.    3.  TUBULAR ADENOMA, DESCENDING COLON.    4.  SIGMOID COLON, MUCOSAL BIOPSY:  NO SIGNIFICANT PATHOLOGIC DIAGNOSIS.      Gross Description       In 4 containers, each of these show mucosal biopsies measuring up to 0.3 cm in greatest dimension.  Embedded accordingly.  1A terminal ileum; 2A transverse colon; 3A polyp, descending colon; 4A sigmoid colon.     Results for orders placed or performed in visit on 12/19/17   Reflexed QuantiFERON In   Result Value Ref Range    QuantiFERON-TB Gold In Tube Negative Negative    QuantiFERON Criteria Comment     QuantiFERON TB Ag Value 0.03 IU/mL    QuantiFERON Nil Value 0.03 IU/mL    QuantiFERON Mitogen Value >10.00 IU/mL    QFT TB Ag minus Nil Value 0.00 IU/mL    Interpretation Comment    QuantiFERON TB Gold   Result Value Ref Range    QuantiFERON Incubation Comment    CBC Auto Differential   Result Value Ref Range    WBC 10.27 (H) 3.20 - 9.80 10*3/mm3    RBC 4.65 4.37 - 5.74 10*6/mm3    Hemoglobin 14.9 13.7 - 17.3 g/dL    Hematocrit 42.8 39.0 - 49.0 %    MCV 92.0 80.0 - 98.0 fL    MCH 32.0 26.5 - 34.0 pg    MCHC 34.8 31.5 - 36.3 g/dL    RDW 14.6 (H) 11.5 - 14.5 %    RDW-SD 49.2 (H) 35.1 - 43.9 fl    MPV 10.4 8.0 - 12.0 fL    Platelets 373 150 - 450 10*3/mm3    Neutrophil % 47.0 37.0 - 80.0 %    Lymphocyte % 36.6 10.0 - 50.0 %    Monocyte % 11.0 0.0 - 12.0 %    Eosinophil % 4.7 0.0 - 7.0 %    Basophil % 0.5 0.0 - 2.0 %    Immature  Grans % 0.2 0.0 - 0.5 %    Neutrophils, Absolute 4.83 2.00 - 8.60 10*3/mm3    Lymphocytes, Absolute 3.76 0.60 - 4.20 10*3/mm3    Monocytes, Absolute 1.13 (H) 0.00 - 0.90 10*3/mm3    Eosinophils, Absolute 0.48 0.00 - 0.70 10*3/mm3    Basophils, Absolute 0.05 0.00 - 0.20 10*3/mm3    Immature Grans, Absolute 0.02 0.00 - 0.02 10*3/mm3   Comprehensive Metabolic Panel   Result Value Ref Range    Glucose 98 60 - 100 mg/dL    BUN 7 7 - 21 mg/dL    Creatinine 0.96 0.70 - 1.30 mg/dL    Sodium 138 137 - 145 mmol/L    Potassium 3.6 3.5 - 5.1 mmol/L    Chloride 102 95 - 110 mmol/L    CO2 26.0 22.0 - 31.0 mmol/L    Calcium 9.1 8.4 - 10.2 mg/dL    Total Protein 8.0 6.3 - 8.6 g/dL    Albumin 4.30 3.40 - 4.80 g/dL    ALT (SGPT) 40 21 - 72 U/L    AST (SGOT) 37 17 - 59 U/L    Alkaline Phosphatase 70 38 - 126 U/L    Total Bilirubin 0.8 0.2 - 1.3 mg/dL    eGFR Non  Amer 83 56 - 130 mL/min/1.73    Globulin 3.7 (H) 2.3 - 3.5 gm/dL    A/G Ratio 1.2 1.1 - 1.8 g/dL    BUN/Creatinine Ratio 7.3 7.0 - 25.0    Anion Gap 10.0 5.0 - 15.0 mmol/L     *Note: Due to a large number of results and/or encounters for the requested time period, some results have not been displayed. A complete set of results can be found in Results Review.

## 2020-03-05 NOTE — PATIENT INSTRUCTIONS

## 2020-03-06 LAB
DEPRECATED RDW RBC AUTO: 43.9 FL (ref 37–54)
ERYTHROCYTE [DISTWIDTH] IN BLOOD BY AUTOMATED COUNT: 13.1 % (ref 12.3–15.4)
HCT VFR BLD AUTO: 42.3 % (ref 37.5–51)
HGB BLD-MCNC: 14.7 G/DL (ref 13–17.7)
IRON 24H UR-MRATE: 76 MCG/DL (ref 59–158)
IRON SATN MFR SERPL: 24 % (ref 20–50)
MCH RBC QN AUTO: 32 PG (ref 26.6–33)
MCHC RBC AUTO-ENTMCNC: 34.8 G/DL (ref 31.5–35.7)
MCV RBC AUTO: 92.2 FL (ref 79–97)
PLATELET # BLD AUTO: 341 10*3/MM3 (ref 140–450)
PMV BLD AUTO: 11.7 FL (ref 6–12)
RBC # BLD AUTO: 4.59 10*6/MM3 (ref 4.14–5.8)
TIBC SERPL-MCNC: 311 MCG/DL (ref 298–536)
TRANSFERRIN SERPL-MCNC: 209 MG/DL (ref 200–360)
VIT B12 BLD-MCNC: 242 PG/ML (ref 211–946)
WBC NRBC COR # BLD: 11.81 10*3/MM3 (ref 3.4–10.8)

## 2020-03-08 LAB
QUANTIFERON CRITERIA: NORMAL
QUANTIFERON MITOGEN VALUE: >10 IU/ML
QUANTIFERON NIL VALUE: 0.02 IU/ML
QUANTIFERON TB1 AG VALUE: 0.02 IU/ML
QUANTIFERON TB2 AG VALUE: 0.02 IU/ML
QUANTIFERON-TB GOLD PLUS: NEGATIVE

## 2020-09-08 ENCOUNTER — OFFICE VISIT (OUTPATIENT)
Dept: GASTROENTEROLOGY | Facility: CLINIC | Age: 55
End: 2020-09-08

## 2020-09-08 VITALS
WEIGHT: 190.6 LBS | HEART RATE: 62 BPM | DIASTOLIC BLOOD PRESSURE: 90 MMHG | BODY MASS INDEX: 25.81 KG/M2 | SYSTOLIC BLOOD PRESSURE: 129 MMHG | HEIGHT: 72 IN

## 2020-09-08 DIAGNOSIS — K50.00 CROHN'S DISEASE OF SMALL INTESTINE WITHOUT COMPLICATION (HCC): Primary | ICD-10-CM

## 2020-09-08 PROCEDURE — 99214 OFFICE O/P EST MOD 30 MIN: CPT | Performed by: NURSE PRACTITIONER

## 2020-09-08 NOTE — PATIENT INSTRUCTIONS

## 2020-09-08 NOTE — H&P (VIEW-ONLY)
Chief Complaint   Patient presents with   • Crohn's Disease       Subjective    Jose Trent is a 54 y.o. male. he is here today for follow-up.    History of Present Illness  54-year-old male with Crohn disease presents for 6-month recheck.  At last visit we schedule colonoscopy however he canceled due to COVID pandemic and is rescheduled for September 23.  States he is done very well with Humira he still has diarrhea 5-10 times per day depending on intake but denies any nocturnal stooling.  Denies any mucus or blood within the stool recently.  Colonoscopy February 2019 noted no active disease but there was localized area of mild erythema in the terminal ileum.  He also reports reflux is been controlled with omeprazole daily would like to continue that.       The following portions of the patient's history were reviewed and updated as appropriate:   Past Medical History:   Diagnosis Date   • Backache     of indeterminate etiology   • Cobalamin deficiency    • Crohn's disease (CMS/HCC)     With ileocolic stricture now resected   • Crohn's disease, small intestine (CMS/HCC)    • Drug therapy     long term   • GERD (gastroesophageal reflux disease)    • Partial obstruction of small intestine (CMS/HCC)    • Pharyngitis    • Portal hypertension (CMS/HCC)    • Portal vein thrombosis     And splenic vein   • Rhinitis    • Tobacco dependence syndrome     encouraged to stop   • Upper respiratory infection      Past Surgical History:   Procedure Laterality Date   • COLECTOMY PARTIAL / TOTAL      Resection of segment of sigmoid colon, resection of 45 cm of ileum, right colon, with ileotransverse colon anastomosis; resection of segment of bladder, suprapubic cystostomy. 03/28/1984       • COLONOSCOPY  07/02/2015   • COLONOSCOPY N/A 2/22/2019    Procedure: COLONOSCOPY;  Surgeon: Dewayne Beavers MD;  Location: [facility] ENDOSCOPY;  Service: Gastroenterology   • ENDOSCOPY      Prior resect of cecum & ascend colon  found.[place] beyond strict has a normal appear.Stenosis of anastomosis, will not allow pass of scope.Linear ulcerat of anastomosis.Narrow strict & not long strict as reported by xray. Dilatation performed. 6/26/2014       • ENDOSCOPY      Normal hypopharynx, esophagus, dudoenum, symmetrical & patent pylorus. Hiatus hernia in GE junction. Mild non-erosive gastritis in antrum. Biopsy taken. 01/31/2013       • EXPLORATORY LAPAROTOMY      Exploratory laparotomy with adhesiolysis.Takedown of previous anastomosis.Resection of terminal ileum and proximal transverse colon w/a stapled CODY anastomosis between ileum and transverse colon. 03/07/2016       • SPLENECTOMY       Splenomegaly and hypersplenism 11/02/2007    • UPPER GASTROINTESTINAL ENDOSCOPY  01/31/2013     Family History   Problem Relation Age of Onset   • Cancer Other        Prior to Admission medications    Medication Sig Start Date End Date Taking? Authorizing Provider   adalimumab (HUMIRA) 40 MG/0.8ML Prefilled Syringe Kit injection Inject 40 mg under the skin every 14 (fourteen) days.   Yes Provider, MD Chandrika   aspirin 81 MG EC tablet Take 81 mg by mouth daily.   Yes Provider, MD Chandrika   omeprazole (priLOSEC) 20 MG capsule TAKE ONE CAPSULE BY MOUTH TWICE DAILY 1/27/20  Yes Torrie Paul APRN   sodium-potassium-magnesium sulfates (SUPREP BOWEL PREP KIT) 17.5-3.13-1.6 GM/177ML solution oral solution Take 1 bottle by mouth Every 12 (Twelve) Hours. 3/5/20 9/8/20  Torrie Paul APRN     No Known Allergies  Social History     Socioeconomic History   • Marital status:      Spouse name: Not on file   • Number of children: Not on file   • Years of education: Not on file   • Highest education level: Not on file   Tobacco Use   • Smoking status: Current Every Day Smoker     Packs/day: 1.00     Types: Cigarettes   • Smokeless tobacco: Never Used   Substance and Sexual Activity   • Alcohol use: No   • Drug use: No   • Sexual activity: Defer     Comment:  "       Review of Systems  Review of Systems   Constitutional: Positive for fatigue. Negative for activity change, appetite change, chills, diaphoresis, fever and unexpected weight change.   HENT: Negative for sore throat and trouble swallowing.    Respiratory: Negative for shortness of breath.    Gastrointestinal: Positive for abdominal pain (rare ) and diarrhea. Negative for abdominal distention, anal bleeding, blood in stool, constipation, nausea, rectal pain and vomiting.   Musculoskeletal: Negative for arthralgias.   Skin: Negative for pallor.   Neurological: Negative for light-headedness.        /90 (BP Location: Left arm)   Pulse 62   Ht 182.9 cm (72\")   Wt 86.5 kg (190 lb 9.6 oz)   BMI 25.85 kg/m²     Objective    Physical Exam   Constitutional: He is oriented to person, place, and time. He appears well-developed and well-nourished. He is cooperative. No distress.   HENT:   Head: Normocephalic and atraumatic.   Neck: Normal range of motion. Neck supple. No thyromegaly present.   Cardiovascular: Normal rate, regular rhythm and normal heart sounds.   Pulmonary/Chest: Effort normal and breath sounds normal. He has no wheezes. He has no rhonchi. He has no rales.   Abdominal: Soft. Normal appearance and bowel sounds are normal. He exhibits no distension. There is no hepatosplenomegaly. There is generalized tenderness. There is no rigidity and no guarding. No hernia.   Lymphadenopathy:     He has no cervical adenopathy.   Neurological: He is alert and oriented to person, place, and time.   Skin: Skin is warm, dry and intact. No rash noted. No pallor.   Psychiatric: He has a normal mood and affect. His speech is normal.     Office Visit on 03/05/2020   Component Date Value Ref Range Status   • WBC 03/05/2020 11.81* 3.40 - 10.80 10*3/mm3 Final   • RBC 03/05/2020 4.59  4.14 - 5.80 10*6/mm3 Final   • Hemoglobin 03/05/2020 14.7  13.0 - 17.7 g/dL Final   • Hematocrit 03/05/2020 42.3  37.5 - 51.0 % Final  "   • MCV 03/05/2020 92.2  79.0 - 97.0 fL Final   • MCH 03/05/2020 32.0  26.6 - 33.0 pg Final   • MCHC 03/05/2020 34.8  31.5 - 35.7 g/dL Final   • RDW 03/05/2020 13.1  12.3 - 15.4 % Final   • RDW-SD 03/05/2020 43.9  37.0 - 54.0 fl Final   • MPV 03/05/2020 11.7  6.0 - 12.0 fL Final   • Platelets 03/05/2020 341  140 - 450 10*3/mm3 Final   • Iron 03/05/2020 76  59 - 158 mcg/dL Final   • Iron Saturation 03/05/2020 24  20 - 50 % Final   • Transferrin 03/05/2020 209  200 - 360 mg/dL Final   • TIBC 03/05/2020 311  298 - 536 mcg/dL Final   • Vitamin B-12 03/05/2020 242  211 - 946 pg/mL Final     Assessment/Plan      1. Crohn's disease of small intestine without complication (CMS/HCC)    .   Continue current regimen of Humira for Crohn disease and omeprazole for reflux.  Will call patient with colonoscopy results when available follow-up in 6 months with lab work.  Return office sooner if needed    Orders placed during this encounter include:  No orders of the defined types were placed in this encounter.      * Surgery not found *    Review and/or summary of lab tests, radiology, procedures, medications. Review and summary of old records and obtaining of history. The risks and benefits of my recommendations, as well as other treatment options were discussed with the patient today. Questions were answered.    No orders of the defined types were placed in this encounter.      Follow-up: Return in about 6 months (around 3/8/2021) for Recheck.          This document has been electronically signed by VEENA Cantu on September 8, 2020 15:58             Results for orders placed or performed in visit on 03/05/20   Iron Profile   Result Value Ref Range    Iron 76 59 - 158 mcg/dL    Iron Saturation 24 20 - 50 %    Transferrin 209 200 - 360 mg/dL    TIBC 311 298 - 536 mcg/dL   CBC (No Diff)   Result Value Ref Range    WBC 11.81 (H) 3.40 - 10.80 10*3/mm3    RBC 4.59 4.14 - 5.80 10*6/mm3    Hemoglobin 14.7 13.0 - 17.7 g/dL     Hematocrit 42.3 37.5 - 51.0 %    MCV 92.2 79.0 - 97.0 fL    MCH 32.0 26.6 - 33.0 pg    MCHC 34.8 31.5 - 35.7 g/dL    RDW 13.1 12.3 - 15.4 %    RDW-SD 43.9 37.0 - 54.0 fl    MPV 11.7 6.0 - 12.0 fL    Platelets 341 140 - 450 10*3/mm3   Vitamin B12   Result Value Ref Range    Vitamin B-12 242 211 - 946 pg/mL   Results for orders placed or performed in visit on 03/05/20   QuantiFERON TB Plus Client Incubated   Result Value Ref Range    QuantiFERON Criteria Comment     QUANTIFERON TB1 AG VALUE 0.02 IU/mL    QUANTIFERON TB2 AG VALUE 0.02 IU/mL    QuantiFERON Nil Value 0.02 IU/mL    QuantiFERON Mitogen Value >10.00 IU/mL    QUANTIFERON-TB GOLD PLUS Negative Negative   Results for orders placed or performed in visit on 03/05/19   CBC Auto Differential   Result Value Ref Range    WBC 13.75 (H) 3.40 - 10.80 10*3/mm3    RBC 4.92 4.14 - 5.80 10*6/mm3    Hemoglobin 15.2 13.0 - 17.7 g/dL    Hematocrit 45.0 37.5 - 51.0 %    MCV 91.5 79.0 - 97.0 fL    MCH 30.9 26.6 - 33.0 pg    MCHC 33.8 31.5 - 35.7 g/dL    RDW 14.9 12.3 - 15.4 %    RDW-SD 50.3 37.0 - 54.0 fl    MPV 10.9 6.0 - 12.0 fL    Platelets 416 140 - 450 10*3/mm3    Neutrophil % 54.4 42.7 - 76.0 %    Lymphocyte % 33.6 19.6 - 45.3 %    Monocyte % 6.1 5.0 - 12.0 %    Eosinophil % 4.7 0.3 - 6.2 %    Basophil % 0.8 0.0 - 1.5 %    Immature Grans % 0.4 0.0 - 0.5 %    Neutrophils, Absolute 7.49 (H) 1.40 - 7.00 10*3/mm3    Lymphocytes, Absolute 4.62 (H) 0.70 - 3.10 10*3/mm3    Monocytes, Absolute 0.84 0.10 - 0.90 10*3/mm3    Eosinophils, Absolute 0.64 (H) 0.00 - 0.40 10*3/mm3    Basophils, Absolute 0.11 0.00 - 0.20 10*3/mm3    Immature Grans, Absolute 0.05 0.00 - 0.05 10*3/mm3    nRBC 0.0 0.0 - 0.0 /100 WBC   Comprehensive Metabolic Panel   Result Value Ref Range    Glucose 92 60 - 100 mg/dL    BUN 7 7 - 21 mg/dL    Creatinine 0.81 0.70 - 1.30 mg/dL    Sodium 137 137 - 145 mmol/L    Potassium 3.7 3.5 - 5.1 mmol/L    Chloride 103 95 - 110 mmol/L    CO2 29.0 22.0 - 31.0 mmol/L     Calcium 9.1 8.4 - 10.2 mg/dL    Total Protein 7.5 6.3 - 8.6 g/dL    Albumin 4.10 3.40 - 4.80 g/dL    ALT (SGPT) 29 21 - 72 U/L    AST (SGOT) 31 17 - 59 U/L    Alkaline Phosphatase 78 38 - 126 U/L    Total Bilirubin 0.9 0.2 - 1.3 mg/dL    eGFR Non  Amer 100 56 - 130 mL/min/1.73    Globulin 3.4 2.3 - 3.5 gm/dL    A/G Ratio 1.2 1.1 - 1.8 g/dL    BUN/Creatinine Ratio 8.6 7.0 - 25.0    Anion Gap 5.0 5.0 - 15.0 mmol/L   Results for orders placed or performed in visit on 03/05/19   QuantiFERON TB Gold   Result Value Ref Range    QuantiFERON Incubation Incubation performed.     QuantiFERON Criteria Comment     QUANTIFERON TB1 AG VALUE 0.04 IU/mL    QUANTIFERON TB2 AG VALUE 0.03 IU/mL    QuantiFERON Nil Value 0.04 IU/mL    QuantiFERON Mitogen Value >10.00 IU/mL    QUANTIFERON-TB GOLD PLUS Negative Negative   Iron Profile   Result Value Ref Range    Iron 121 49 - 181 mcg/dL    TIBC 358 261 - 462 mcg/dL    Iron Saturation 34 20 - 55 %   Vitamin B12   Result Value Ref Range    Vitamin B-12 169 (L) 239 - 931 pg/mL   Results for orders placed or performed during the hospital encounter of 02/22/19   Tissue Pathology Exam   Result Value Ref Range    Case Report       Surgical Pathology Report                         Case: [id number]                                  Authorizing Provider:  Dewayne Beavers MD        Collected:           02/22/2019 11:35 AM          Ordering Location:     [facility]             Received:            02/22/2019 12:01 PM                                 [place] [facility]                                                     Pathologist:           Adarsh Shi MD                                                        Specimens:   1) - Small Intestine, Ileum, TI                                                                     2) - Large Intestine, Transverse Colon                                                              3) - Large Intestine, Left / Descending Colon, POLYP                                                 4) - Large Intestine, Sigmoid Colon                                                        Final Diagnosis       1.  TERMINAL ILEUM, MUCOSAL BIOPSY:  NO SIGNIFICANT PATHOLOGIC DIAGNOSIS.    2.  TRANSVERSE COLON, MUCOSAL BIOPSY:  NO SIGNIFICANT PATHOLOGIC DIAGNOSIS.    3.  TUBULAR ADENOMA, DESCENDING COLON.    4.  SIGMOID COLON, MUCOSAL BIOPSY:  NO SIGNIFICANT PATHOLOGIC DIAGNOSIS.      Gross Description       In 4 containers, each of these show mucosal biopsies measuring up to 0.3 cm in greatest dimension.  Embedded accordingly.  1A terminal ileum; 2A transverse colon; 3A polyp, descending colon; 4A sigmoid colon.     Results for orders placed or performed in visit on 12/19/17   Reflexed QuantiFERON In   Result Value Ref Range    QuantiFERON-TB Gold In Tube Negative Negative    QuantiFERON Criteria Comment     QuantiFERON TB Ag Value 0.03 IU/mL    QuantiFERON Nil Value 0.03 IU/mL    QuantiFERON Mitogen Value >10.00 IU/mL    QFT TB Ag minus Nil Value 0.00 IU/mL    Interpretation Comment    QuantiFERON TB Gold   Result Value Ref Range    QuantiFERON Incubation Comment    CBC Auto Differential   Result Value Ref Range    WBC 10.27 (H) 3.20 - 9.80 10*3/mm3    RBC 4.65 4.37 - 5.74 10*6/mm3    Hemoglobin 14.9 13.7 - 17.3 g/dL    Hematocrit 42.8 39.0 - 49.0 %    MCV 92.0 80.0 - 98.0 fL    MCH 32.0 26.5 - 34.0 pg    MCHC 34.8 31.5 - 36.3 g/dL    RDW 14.6 (H) 11.5 - 14.5 %    RDW-SD 49.2 (H) 35.1 - 43.9 fl    MPV 10.4 8.0 - 12.0 fL    Platelets 373 150 - 450 10*3/mm3    Neutrophil % 47.0 37.0 - 80.0 %    Lymphocyte % 36.6 10.0 - 50.0 %    Monocyte % 11.0 0.0 - 12.0 %    Eosinophil % 4.7 0.0 - 7.0 %    Basophil % 0.5 0.0 - 2.0 %    Immature Grans % 0.2 0.0 - 0.5 %    Neutrophils, Absolute 4.83 2.00 - 8.60 10*3/mm3    Lymphocytes, Absolute 3.76 0.60 - 4.20 10*3/mm3    Monocytes, Absolute 1.13 (H) 0.00 - 0.90 10*3/mm3    Eosinophils, Absolute 0.48 0.00 - 0.70 10*3/mm3    Basophils,  Absolute 0.05 0.00 - 0.20 10*3/mm3    Immature Grans, Absolute 0.02 0.00 - 0.02 10*3/mm3     *Note: Due to a large number of results and/or encounters for the requested time period, some results have not been displayed. A complete set of results can be found in Results Review.

## 2020-09-08 NOTE — PROGRESS NOTES
Chief Complaint   Patient presents with   • Crohn's Disease       Subjective    Jose Trent is a 54 y.o. male. he is here today for follow-up.    History of Present Illness  54-year-old male with Crohn disease presents for 6-month recheck.  At last visit we schedule colonoscopy however he canceled due to COVID pandemic and is rescheduled for September 23.  States he is done very well with Humira he still has diarrhea 5-10 times per day depending on intake but denies any nocturnal stooling.  Denies any mucus or blood within the stool recently.  Colonoscopy February 2019 noted no active disease but there was localized area of mild erythema in the terminal ileum.  He also reports reflux is been controlled with omeprazole daily would like to continue that.       The following portions of the patient's history were reviewed and updated as appropriate:   Past Medical History:   Diagnosis Date   • Backache     of indeterminate etiology   • Cobalamin deficiency    • Crohn's disease (CMS/HCC)     With ileocolic stricture now resected   • Crohn's disease, small intestine (CMS/HCC)    • Drug therapy     long term   • GERD (gastroesophageal reflux disease)    • Partial obstruction of small intestine (CMS/HCC)    • Pharyngitis    • Portal hypertension (CMS/HCC)    • Portal vein thrombosis     And splenic vein   • Rhinitis    • Tobacco dependence syndrome     encouraged to stop   • Upper respiratory infection      Past Surgical History:   Procedure Laterality Date   • COLECTOMY PARTIAL / TOTAL      Resection of segment of sigmoid colon, resection of 45 cm of ileum, right colon, with ileotransverse colon anastomosis; resection of segment of bladder, suprapubic cystostomy. 03/28/1984       • COLONOSCOPY  07/02/2015   • COLONOSCOPY N/A 2/22/2019    Procedure: COLONOSCOPY;  Surgeon: Dewayne Beavers MD;  Location: [facility] ENDOSCOPY;  Service: Gastroenterology   • ENDOSCOPY      Prior resect of cecum & ascend colon  found.[place] beyond strict has a normal appear.Stenosis of anastomosis, will not allow pass of scope.Linear ulcerat of anastomosis.Narrow strict & not long strict as reported by xray. Dilatation performed. 6/26/2014       • ENDOSCOPY      Normal hypopharynx, esophagus, dudoenum, symmetrical & patent pylorus. Hiatus hernia in GE junction. Mild non-erosive gastritis in antrum. Biopsy taken. 01/31/2013       • EXPLORATORY LAPAROTOMY      Exploratory laparotomy with adhesiolysis.Takedown of previous anastomosis.Resection of terminal ileum and proximal transverse colon w/a stapled CODY anastomosis between ileum and transverse colon. 03/07/2016       • SPLENECTOMY       Splenomegaly and hypersplenism 11/02/2007    • UPPER GASTROINTESTINAL ENDOSCOPY  01/31/2013     Family History   Problem Relation Age of Onset   • Cancer Other        Prior to Admission medications    Medication Sig Start Date End Date Taking? Authorizing Provider   adalimumab (HUMIRA) 40 MG/0.8ML Prefilled Syringe Kit injection Inject 40 mg under the skin every 14 (fourteen) days.   Yes Provider, MD Chandrika   aspirin 81 MG EC tablet Take 81 mg by mouth daily.   Yes Provider, MD Chandrika   omeprazole (priLOSEC) 20 MG capsule TAKE ONE CAPSULE BY MOUTH TWICE DAILY 1/27/20  Yes Torrie Paul APRN   sodium-potassium-magnesium sulfates (SUPREP BOWEL PREP KIT) 17.5-3.13-1.6 GM/177ML solution oral solution Take 1 bottle by mouth Every 12 (Twelve) Hours. 3/5/20 9/8/20  Torrie Paul APRN     No Known Allergies  Social History     Socioeconomic History   • Marital status:      Spouse name: Not on file   • Number of children: Not on file   • Years of education: Not on file   • Highest education level: Not on file   Tobacco Use   • Smoking status: Current Every Day Smoker     Packs/day: 1.00     Types: Cigarettes   • Smokeless tobacco: Never Used   Substance and Sexual Activity   • Alcohol use: No   • Drug use: No   • Sexual activity: Defer     Comment:  "       Review of Systems  Review of Systems   Constitutional: Positive for fatigue. Negative for activity change, appetite change, chills, diaphoresis, fever and unexpected weight change.   HENT: Negative for sore throat and trouble swallowing.    Respiratory: Negative for shortness of breath.    Gastrointestinal: Positive for abdominal pain (rare ) and diarrhea. Negative for abdominal distention, anal bleeding, blood in stool, constipation, nausea, rectal pain and vomiting.   Musculoskeletal: Negative for arthralgias.   Skin: Negative for pallor.   Neurological: Negative for light-headedness.        /90 (BP Location: Left arm)   Pulse 62   Ht 182.9 cm (72\")   Wt 86.5 kg (190 lb 9.6 oz)   BMI 25.85 kg/m²     Objective    Physical Exam   Constitutional: He is oriented to person, place, and time. He appears well-developed and well-nourished. He is cooperative. No distress.   HENT:   Head: Normocephalic and atraumatic.   Neck: Normal range of motion. Neck supple. No thyromegaly present.   Cardiovascular: Normal rate, regular rhythm and normal heart sounds.   Pulmonary/Chest: Effort normal and breath sounds normal. He has no wheezes. He has no rhonchi. He has no rales.   Abdominal: Soft. Normal appearance and bowel sounds are normal. He exhibits no distension. There is no hepatosplenomegaly. There is generalized tenderness. There is no rigidity and no guarding. No hernia.   Lymphadenopathy:     He has no cervical adenopathy.   Neurological: He is alert and oriented to person, place, and time.   Skin: Skin is warm, dry and intact. No rash noted. No pallor.   Psychiatric: He has a normal mood and affect. His speech is normal.     Office Visit on 03/05/2020   Component Date Value Ref Range Status   • WBC 03/05/2020 11.81* 3.40 - 10.80 10*3/mm3 Final   • RBC 03/05/2020 4.59  4.14 - 5.80 10*6/mm3 Final   • Hemoglobin 03/05/2020 14.7  13.0 - 17.7 g/dL Final   • Hematocrit 03/05/2020 42.3  37.5 - 51.0 % Final "   • MCV 03/05/2020 92.2  79.0 - 97.0 fL Final   • MCH 03/05/2020 32.0  26.6 - 33.0 pg Final   • MCHC 03/05/2020 34.8  31.5 - 35.7 g/dL Final   • RDW 03/05/2020 13.1  12.3 - 15.4 % Final   • RDW-SD 03/05/2020 43.9  37.0 - 54.0 fl Final   • MPV 03/05/2020 11.7  6.0 - 12.0 fL Final   • Platelets 03/05/2020 341  140 - 450 10*3/mm3 Final   • Iron 03/05/2020 76  59 - 158 mcg/dL Final   • Iron Saturation 03/05/2020 24  20 - 50 % Final   • Transferrin 03/05/2020 209  200 - 360 mg/dL Final   • TIBC 03/05/2020 311  298 - 536 mcg/dL Final   • Vitamin B-12 03/05/2020 242  211 - 946 pg/mL Final     Assessment/Plan      1. Crohn's disease of small intestine without complication (CMS/HCC)    .   Continue current regimen of Humira for Crohn disease and omeprazole for reflux.  Will call patient with colonoscopy results when available follow-up in 6 months with lab work.  Return office sooner if needed    Orders placed during this encounter include:  No orders of the defined types were placed in this encounter.      * Surgery not found *    Review and/or summary of lab tests, radiology, procedures, medications. Review and summary of old records and obtaining of history. The risks and benefits of my recommendations, as well as other treatment options were discussed with the patient today. Questions were answered.    No orders of the defined types were placed in this encounter.      Follow-up: Return in about 6 months (around 3/8/2021) for Recheck.          This document has been electronically signed by VEENA Cantu on September 8, 2020 15:58             Results for orders placed or performed in visit on 03/05/20   Iron Profile   Result Value Ref Range    Iron 76 59 - 158 mcg/dL    Iron Saturation 24 20 - 50 %    Transferrin 209 200 - 360 mg/dL    TIBC 311 298 - 536 mcg/dL   CBC (No Diff)   Result Value Ref Range    WBC 11.81 (H) 3.40 - 10.80 10*3/mm3    RBC 4.59 4.14 - 5.80 10*6/mm3    Hemoglobin 14.7 13.0 - 17.7 g/dL     Hematocrit 42.3 37.5 - 51.0 %    MCV 92.2 79.0 - 97.0 fL    MCH 32.0 26.6 - 33.0 pg    MCHC 34.8 31.5 - 35.7 g/dL    RDW 13.1 12.3 - 15.4 %    RDW-SD 43.9 37.0 - 54.0 fl    MPV 11.7 6.0 - 12.0 fL    Platelets 341 140 - 450 10*3/mm3   Vitamin B12   Result Value Ref Range    Vitamin B-12 242 211 - 946 pg/mL   Results for orders placed or performed in visit on 03/05/20   QuantiFERON TB Plus Client Incubated   Result Value Ref Range    QuantiFERON Criteria Comment     QUANTIFERON TB1 AG VALUE 0.02 IU/mL    QUANTIFERON TB2 AG VALUE 0.02 IU/mL    QuantiFERON Nil Value 0.02 IU/mL    QuantiFERON Mitogen Value >10.00 IU/mL    QUANTIFERON-TB GOLD PLUS Negative Negative   Results for orders placed or performed in visit on 03/05/19   CBC Auto Differential   Result Value Ref Range    WBC 13.75 (H) 3.40 - 10.80 10*3/mm3    RBC 4.92 4.14 - 5.80 10*6/mm3    Hemoglobin 15.2 13.0 - 17.7 g/dL    Hematocrit 45.0 37.5 - 51.0 %    MCV 91.5 79.0 - 97.0 fL    MCH 30.9 26.6 - 33.0 pg    MCHC 33.8 31.5 - 35.7 g/dL    RDW 14.9 12.3 - 15.4 %    RDW-SD 50.3 37.0 - 54.0 fl    MPV 10.9 6.0 - 12.0 fL    Platelets 416 140 - 450 10*3/mm3    Neutrophil % 54.4 42.7 - 76.0 %    Lymphocyte % 33.6 19.6 - 45.3 %    Monocyte % 6.1 5.0 - 12.0 %    Eosinophil % 4.7 0.3 - 6.2 %    Basophil % 0.8 0.0 - 1.5 %    Immature Grans % 0.4 0.0 - 0.5 %    Neutrophils, Absolute 7.49 (H) 1.40 - 7.00 10*3/mm3    Lymphocytes, Absolute 4.62 (H) 0.70 - 3.10 10*3/mm3    Monocytes, Absolute 0.84 0.10 - 0.90 10*3/mm3    Eosinophils, Absolute 0.64 (H) 0.00 - 0.40 10*3/mm3    Basophils, Absolute 0.11 0.00 - 0.20 10*3/mm3    Immature Grans, Absolute 0.05 0.00 - 0.05 10*3/mm3    nRBC 0.0 0.0 - 0.0 /100 WBC   Comprehensive Metabolic Panel   Result Value Ref Range    Glucose 92 60 - 100 mg/dL    BUN 7 7 - 21 mg/dL    Creatinine 0.81 0.70 - 1.30 mg/dL    Sodium 137 137 - 145 mmol/L    Potassium 3.7 3.5 - 5.1 mmol/L    Chloride 103 95 - 110 mmol/L    CO2 29.0 22.0 - 31.0 mmol/L     Calcium 9.1 8.4 - 10.2 mg/dL    Total Protein 7.5 6.3 - 8.6 g/dL    Albumin 4.10 3.40 - 4.80 g/dL    ALT (SGPT) 29 21 - 72 U/L    AST (SGOT) 31 17 - 59 U/L    Alkaline Phosphatase 78 38 - 126 U/L    Total Bilirubin 0.9 0.2 - 1.3 mg/dL    eGFR Non  Amer 100 56 - 130 mL/min/1.73    Globulin 3.4 2.3 - 3.5 gm/dL    A/G Ratio 1.2 1.1 - 1.8 g/dL    BUN/Creatinine Ratio 8.6 7.0 - 25.0    Anion Gap 5.0 5.0 - 15.0 mmol/L   Results for orders placed or performed in visit on 03/05/19   QuantiFERON TB Gold   Result Value Ref Range    QuantiFERON Incubation Incubation performed.     QuantiFERON Criteria Comment     QUANTIFERON TB1 AG VALUE 0.04 IU/mL    QUANTIFERON TB2 AG VALUE 0.03 IU/mL    QuantiFERON Nil Value 0.04 IU/mL    QuantiFERON Mitogen Value >10.00 IU/mL    QUANTIFERON-TB GOLD PLUS Negative Negative   Iron Profile   Result Value Ref Range    Iron 121 49 - 181 mcg/dL    TIBC 358 261 - 462 mcg/dL    Iron Saturation 34 20 - 55 %   Vitamin B12   Result Value Ref Range    Vitamin B-12 169 (L) 239 - 931 pg/mL   Results for orders placed or performed during the hospital encounter of 02/22/19   Tissue Pathology Exam   Result Value Ref Range    Case Report       Surgical Pathology Report                         Case: [id number]                                  Authorizing Provider:  Dewayne Beavers MD        Collected:           02/22/2019 11:35 AM          Ordering Location:     [facility]             Received:            02/22/2019 12:01 PM                                 [facility]                                                     Pathologist:           Adarsh Shi MD                                                        Specimens:   1) - Small Intestine, Ileum, TI                                                                     2) - Large Intestine, Transverse Colon                                                              3) - Large Intestine, Left / Descending Colon, POLYP                                                 4) - Large Intestine, Sigmoid Colon                                                        Final Diagnosis       1.  TERMINAL ILEUM, MUCOSAL BIOPSY:  NO SIGNIFICANT PATHOLOGIC DIAGNOSIS.    2.  TRANSVERSE COLON, MUCOSAL BIOPSY:  NO SIGNIFICANT PATHOLOGIC DIAGNOSIS.    3.  TUBULAR ADENOMA, DESCENDING COLON.    4.  SIGMOID COLON, MUCOSAL BIOPSY:  NO SIGNIFICANT PATHOLOGIC DIAGNOSIS.      Gross Description       In 4 containers, each of these show mucosal biopsies measuring up to 0.3 cm in greatest dimension.  Embedded accordingly.  1A terminal ileum; 2A transverse colon; 3A polyp, descending colon; 4A sigmoid colon.     Results for orders placed or performed in visit on 12/19/17   Reflexed QuantiFERON In   Result Value Ref Range    QuantiFERON-TB Gold In Tube Negative Negative    QuantiFERON Criteria Comment     QuantiFERON TB Ag Value 0.03 IU/mL    QuantiFERON Nil Value 0.03 IU/mL    QuantiFERON Mitogen Value >10.00 IU/mL    QFT TB Ag minus Nil Value 0.00 IU/mL    Interpretation Comment    QuantiFERON TB Gold   Result Value Ref Range    QuantiFERON Incubation Comment    CBC Auto Differential   Result Value Ref Range    WBC 10.27 (H) 3.20 - 9.80 10*3/mm3    RBC 4.65 4.37 - 5.74 10*6/mm3    Hemoglobin 14.9 13.7 - 17.3 g/dL    Hematocrit 42.8 39.0 - 49.0 %    MCV 92.0 80.0 - 98.0 fL    MCH 32.0 26.5 - 34.0 pg    MCHC 34.8 31.5 - 36.3 g/dL    RDW 14.6 (H) 11.5 - 14.5 %    RDW-SD 49.2 (H) 35.1 - 43.9 fl    MPV 10.4 8.0 - 12.0 fL    Platelets 373 150 - 450 10*3/mm3    Neutrophil % 47.0 37.0 - 80.0 %    Lymphocyte % 36.6 10.0 - 50.0 %    Monocyte % 11.0 0.0 - 12.0 %    Eosinophil % 4.7 0.0 - 7.0 %    Basophil % 0.5 0.0 - 2.0 %    Immature Grans % 0.2 0.0 - 0.5 %    Neutrophils, Absolute 4.83 2.00 - 8.60 10*3/mm3    Lymphocytes, Absolute 3.76 0.60 - 4.20 10*3/mm3    Monocytes, Absolute 1.13 (H) 0.00 - 0.90 10*3/mm3    Eosinophils, Absolute 0.48 0.00 - 0.70 10*3/mm3    Basophils,  Absolute 0.05 0.00 - 0.20 10*3/mm3    Immature Grans, Absolute 0.02 0.00 - 0.02 10*3/mm3     *Note: Due to a large number of results and/or encounters for the requested time period, some results have not been displayed. A complete set of results can be found in Results Review.

## 2020-09-20 ENCOUNTER — LAB (OUTPATIENT)
Dept: LAB | Facility: HOSPITAL | Age: 55
End: 2020-09-20

## 2020-09-20 LAB — SARS-COV-2 N GENE RESP QL NAA+PROBE: NOT DETECTED

## 2020-09-20 PROCEDURE — C9803 HOPD COVID-19 SPEC COLLECT: HCPCS | Performed by: INTERNAL MEDICINE

## 2020-09-20 PROCEDURE — 87635 SARS-COV-2 COVID-19 AMP PRB: CPT | Performed by: INTERNAL MEDICINE

## 2020-09-23 ENCOUNTER — ANESTHESIA EVENT (OUTPATIENT)
Dept: GASTROENTEROLOGY | Facility: HOSPITAL | Age: 55
End: 2020-09-23

## 2020-09-23 ENCOUNTER — HOSPITAL ENCOUNTER (OUTPATIENT)
Facility: HOSPITAL | Age: 55
Setting detail: HOSPITAL OUTPATIENT SURGERY
Discharge: HOME | End: 2020-09-23
Attending: INTERNAL MEDICINE | Admitting: INTERNAL MEDICINE

## 2020-09-23 ENCOUNTER — ANESTHESIA (OUTPATIENT)
Dept: GASTROENTEROLOGY | Facility: HOSPITAL | Age: 55
End: 2020-09-23

## 2020-09-23 VITALS
RESPIRATION RATE: 20 BRPM | HEART RATE: 69 BPM | BODY MASS INDEX: 26.54 KG/M2 | SYSTOLIC BLOOD PRESSURE: 109 MMHG | WEIGHT: 185.4 LBS | OXYGEN SATURATION: 97 % | DIASTOLIC BLOOD PRESSURE: 77 MMHG | TEMPERATURE: 97.7 F | HEIGHT: 70 IN

## 2020-09-23 DIAGNOSIS — K50.819 CROHN'S DISEASE OF SMALL AND LARGE INTESTINES WITH COMPLICATION (HCC): ICD-10-CM

## 2020-09-23 PROCEDURE — 88305 TISSUE EXAM BY PATHOLOGIST: CPT

## 2020-09-23 PROCEDURE — 25010000002 PROPOFOL 10 MG/ML EMULSION: Performed by: NURSE ANESTHETIST, CERTIFIED REGISTERED

## 2020-09-23 PROCEDURE — 45380 COLONOSCOPY AND BIOPSY: CPT | Performed by: INTERNAL MEDICINE

## 2020-09-23 RX ORDER — PROPOFOL 10 MG/ML
VIAL (ML) INTRAVENOUS AS NEEDED
Status: DISCONTINUED | OUTPATIENT
Start: 2020-09-23 | End: 2020-09-23 | Stop reason: SURG

## 2020-09-23 RX ORDER — SODIUM CHLORIDE 0.9 % (FLUSH) 0.9 %
3 SYRINGE (ML) INJECTION EVERY 12 HOURS SCHEDULED
Status: DISCONTINUED | OUTPATIENT
Start: 2020-09-23 | End: 2020-09-23 | Stop reason: HOSPADM

## 2020-09-23 RX ORDER — LIDOCAINE HYDROCHLORIDE 20 MG/ML
INJECTION, SOLUTION EPIDURAL; INFILTRATION; INTRACAUDAL; PERINEURAL AS NEEDED
Status: DISCONTINUED | OUTPATIENT
Start: 2020-09-23 | End: 2020-09-23 | Stop reason: SURG

## 2020-09-23 RX ORDER — DEXTROSE AND SODIUM CHLORIDE 5; .45 G/100ML; G/100ML
30 INJECTION, SOLUTION INTRAVENOUS CONTINUOUS PRN
Status: DISCONTINUED | OUTPATIENT
Start: 2020-09-23 | End: 2020-09-23 | Stop reason: HOSPADM

## 2020-09-23 RX ORDER — SODIUM CHLORIDE 0.9 % (FLUSH) 0.9 %
10 SYRINGE (ML) INJECTION AS NEEDED
Status: DISCONTINUED | OUTPATIENT
Start: 2020-09-23 | End: 2020-09-23 | Stop reason: HOSPADM

## 2020-09-23 RX ADMIN — PROPOFOL 30 MG: 10 INJECTION, EMULSION INTRAVENOUS at 10:31

## 2020-09-23 RX ADMIN — PROPOFOL 70 MG: 10 INJECTION, EMULSION INTRAVENOUS at 10:27

## 2020-09-23 RX ADMIN — DEXTROSE AND SODIUM CHLORIDE 30 ML/HR: 5; 450 INJECTION, SOLUTION INTRAVENOUS at 09:51

## 2020-09-23 RX ADMIN — PROPOFOL 30 MG: 10 INJECTION, EMULSION INTRAVENOUS at 10:40

## 2020-09-23 RX ADMIN — PROPOFOL 20 MG: 10 INJECTION, EMULSION INTRAVENOUS at 10:33

## 2020-09-23 RX ADMIN — PROPOFOL 20 MG: 10 INJECTION, EMULSION INTRAVENOUS at 10:38

## 2020-09-23 RX ADMIN — LIDOCAINE HYDROCHLORIDE 60 MG: 20 INJECTION, SOLUTION EPIDURAL; INFILTRATION; INTRACAUDAL; PERINEURAL at 10:27

## 2020-09-23 RX ADMIN — PROPOFOL 30 MG: 10 INJECTION, EMULSION INTRAVENOUS at 10:36

## 2020-09-23 NOTE — ANESTHESIA POSTPROCEDURE EVALUATION
Patient: Jose Trent    Procedure Summary     Date: 09/23/20 Room / Location: [facility] ENDOSCOPY 1 / [facility] ENDOSCOPY    Anesthesia Start: 1025 Anesthesia Stop: 1043    Procedure: COLONOSCOPY WED (N/A ) Diagnosis:       Crohn's disease of small and large intestines with complication (CMS/HCC)      (Crohn's disease of small and large intestines with complication (CMS/HCC) [K50.819])    Surgeon: Dewayne Beavers MD Provider: Ariel Amaya CRNA    Anesthesia Type: MAC ASA Status: 3          Anesthesia Type: MAC    Vitals  No vitals data found for the desired time range.          Post Anesthesia Care and Evaluation    Patient location during evaluation: bedside  Patient participation: complete - patient participated  Level of consciousness: awake and awake and alert  Pain score: 0  Pain management: satisfactory to patient  Airway patency: patent  Anesthetic complications: No anesthetic complications  PONV Status: none  Cardiovascular status: acceptable and stable  Respiratory status: acceptable, room air and spontaneous ventilation  Hydration status: acceptable

## 2020-09-23 NOTE — ANESTHESIA PREPROCEDURE EVALUATION
Anesthesia Evaluation     Patient summary reviewed and Nursing notes reviewed   NPO Solid Status: > 8 hours  NPO Liquid Status: > 2 hours           Airway   Mallampati: II  TM distance: >3 FB  Neck ROM: full  No difficulty expected  Dental    (+) edentulous    Pulmonary - normal exam   (+) a smoker Current Smoked day of surgery, recent URI,   Cardiovascular - normal exam    (+) DVT,       Neuro/Psych- negative ROS  GI/Hepatic/Renal/Endo    (+)  GERD,  liver disease,     ROS Comment: Crohn's disease    Musculoskeletal     (+) back pain,   Abdominal  - normal exam   Substance History - negative use     OB/GYN negative ob/gyn ROS         Other - negative ROS                         Anesthesia Plan    ASA 3     MAC     intravenous induction     Anesthetic plan, all risks, benefits, and alternatives have been provided, discussed and informed consent has been obtained with: patient.

## 2020-09-25 LAB
LAB AP CASE REPORT: NORMAL
PATH REPORT.FINAL DX SPEC: NORMAL

## 2020-09-28 ENCOUNTER — TELEPHONE (OUTPATIENT)
Dept: GASTROENTEROLOGY | Facility: CLINIC | Age: 55
End: 2020-09-28

## 2020-09-28 NOTE — TELEPHONE ENCOUNTER
Called patient to discuss colonoscopy results. Left message. Colon had mild inflammation at anastomosis but biopsies were normal. Follow up in 6 months as planned

## 2020-10-16 DIAGNOSIS — K21.9 GASTROESOPHAGEAL REFLUX DISEASE WITHOUT ESOPHAGITIS: ICD-10-CM

## 2020-10-16 RX ORDER — OMEPRAZOLE 20 MG/1
20 CAPSULE, DELAYED RELEASE ORAL 2 TIMES DAILY
Qty: 60 CAPSULE | Refills: 5 | Status: SHIPPED | OUTPATIENT
Start: 2020-10-16 | End: 2021-04-14

## 2021-04-26 ENCOUNTER — TELEPHONE (OUTPATIENT)
Dept: GASTROENTEROLOGY | Facility: CLINIC | Age: 56
End: 2021-04-26

## 2021-04-26 NOTE — TELEPHONE ENCOUNTER
Spoke with apolinar at [facility], relayed okay to fill patient humira prescription. He has three refills before prescription runs out. Told them we would see paytient soon and resend new script at that time. Spoke with patient and made him an appointment for May 4 at 9am. Patient voiced understanding.

## 2021-05-04 ENCOUNTER — LAB (OUTPATIENT)
Dept: LAB | Facility: HOSPITAL | Age: 56
End: 2021-05-04

## 2021-05-04 ENCOUNTER — OFFICE VISIT (OUTPATIENT)
Dept: GASTROENTEROLOGY | Facility: CLINIC | Age: 56
End: 2021-05-04

## 2021-05-04 VITALS
HEIGHT: 70 IN | WEIGHT: 199.6 LBS | BODY MASS INDEX: 28.58 KG/M2 | SYSTOLIC BLOOD PRESSURE: 148 MMHG | HEART RATE: 74 BPM | DIASTOLIC BLOOD PRESSURE: 85 MMHG

## 2021-05-04 DIAGNOSIS — K50.819 CROHN'S DISEASE OF SMALL AND LARGE INTESTINES WITH COMPLICATION (HCC): Primary | ICD-10-CM

## 2021-05-04 DIAGNOSIS — K50.00 CROHN'S DISEASE OF SMALL INTESTINE WITHOUT COMPLICATION (HCC): Primary | ICD-10-CM

## 2021-05-04 DIAGNOSIS — E53.8 VITAMIN B12 DEFICIENCY: ICD-10-CM

## 2021-05-04 DIAGNOSIS — K21.00 GASTROESOPHAGEAL REFLUX DISEASE WITH ESOPHAGITIS WITHOUT HEMORRHAGE: ICD-10-CM

## 2021-05-04 LAB
25(OH)D3 SERPL-MCNC: 41.3 NG/ML
ALBUMIN SERPL-MCNC: 4.4 G/DL (ref 3.5–5.2)
ALBUMIN/GLOB SERPL: 1.1 G/DL
ALP SERPL-CCNC: 103 U/L (ref 39–117)
ALT SERPL W P-5'-P-CCNC: 20 U/L (ref 1–41)
ANION GAP SERPL CALCULATED.3IONS-SCNC: 9 MMOL/L (ref 5–15)
AST SERPL-CCNC: 25 U/L (ref 1–40)
BILIRUB SERPL-MCNC: 0.6 MG/DL (ref 0–1.2)
BUN SERPL-MCNC: 7 MG/DL (ref 6–20)
BUN/CREAT SERPL: 7.4 (ref 7–25)
CALCIUM SPEC-SCNC: 9.3 MG/DL (ref 8.6–10.5)
CHLORIDE SERPL-SCNC: 103 MMOL/L (ref 98–107)
CO2 SERPL-SCNC: 28 MMOL/L (ref 22–29)
CREAT SERPL-MCNC: 0.95 MG/DL (ref 0.76–1.27)
DEPRECATED RDW RBC AUTO: 51.4 FL (ref 37–54)
ERYTHROCYTE [DISTWIDTH] IN BLOOD BY AUTOMATED COUNT: 14.6 % (ref 12.3–15.4)
GFR SERPL CREATININE-BSD FRML MDRD: 83 ML/MIN/1.73
GLOBULIN UR ELPH-MCNC: 4 GM/DL
GLUCOSE SERPL-MCNC: 85 MG/DL (ref 65–99)
HCT VFR BLD AUTO: 47.5 % (ref 37.5–51)
HGB BLD-MCNC: 16.1 G/DL (ref 13–17.7)
IRON 24H UR-MRATE: 121 MCG/DL (ref 59–158)
IRON SATN MFR SERPL: 26 % (ref 20–50)
MCH RBC QN AUTO: 32.5 PG (ref 26.6–33)
MCHC RBC AUTO-ENTMCNC: 33.9 G/DL (ref 31.5–35.7)
MCV RBC AUTO: 96 FL (ref 79–97)
PLATELET # BLD AUTO: 338 10*3/MM3 (ref 140–450)
PMV BLD AUTO: 11.4 FL (ref 6–12)
POTASSIUM SERPL-SCNC: 4.1 MMOL/L (ref 3.5–5.2)
PROT SERPL-MCNC: 8.4 G/DL (ref 6–8.5)
RBC # BLD AUTO: 4.95 10*6/MM3 (ref 4.14–5.8)
SODIUM SERPL-SCNC: 140 MMOL/L (ref 136–145)
TIBC SERPL-MCNC: 465 MCG/DL (ref 298–536)
TRANSFERRIN SERPL-MCNC: 312 MG/DL (ref 200–360)
VIT B12 BLD-MCNC: 174 PG/ML (ref 211–946)
WBC # BLD AUTO: 9.84 10*3/MM3 (ref 3.4–10.8)

## 2021-05-04 PROCEDURE — 82607 VITAMIN B-12: CPT | Performed by: NURSE PRACTITIONER

## 2021-05-04 PROCEDURE — 80053 COMPREHEN METABOLIC PANEL: CPT | Performed by: NURSE PRACTITIONER

## 2021-05-04 PROCEDURE — 82306 VITAMIN D 25 HYDROXY: CPT | Performed by: NURSE PRACTITIONER

## 2021-05-04 PROCEDURE — 86480 TB TEST CELL IMMUN MEASURE: CPT

## 2021-05-04 PROCEDURE — 84466 ASSAY OF TRANSFERRIN: CPT | Performed by: NURSE PRACTITIONER

## 2021-05-04 PROCEDURE — 83540 ASSAY OF IRON: CPT | Performed by: NURSE PRACTITIONER

## 2021-05-04 PROCEDURE — 99213 OFFICE O/P EST LOW 20 MIN: CPT | Performed by: NURSE PRACTITIONER

## 2021-05-04 PROCEDURE — 85027 COMPLETE CBC AUTOMATED: CPT | Performed by: NURSE PRACTITIONER

## 2021-05-04 PROCEDURE — 36415 COLL VENOUS BLD VENIPUNCTURE: CPT

## 2021-05-04 RX ORDER — CYANOCOBALAMIN 1000 UG/ML
500 INJECTION, SOLUTION INTRAMUSCULAR; SUBCUTANEOUS
Status: SHIPPED | OUTPATIENT
Start: 2021-05-30

## 2021-05-04 RX ORDER — OMEPRAZOLE 20 MG/1
CAPSULE, DELAYED RELEASE ORAL 2 TIMES DAILY
COMMUNITY
End: 2021-08-03

## 2021-05-04 RX ORDER — HYDROXYZINE HYDROCHLORIDE 10 MG/1
10 TABLET, FILM COATED ORAL NIGHTLY PRN
COMMUNITY
Start: 2021-04-07

## 2021-05-04 NOTE — PROGRESS NOTES
Chief Complaint   Patient presents with   • Crohn's Disease       Subjective    Jose Trent is a 55 y.o. male. he is here today for follow-up.    History of Present Illness  55-year-old male presents for follow-up regarding Crohn disease.  States his mother was ill and he was taking care of her missed some doses of his Humira and can tell he was starting a flare of symptoms has restarted it but just now obtained refill.  Still has diarrhea about 5 to 10/day denies any melena or hematochezia denies any nocturnal stooling.  He denies any visible mucus within the stool.  Colonoscopy 9/23/2020 noted localized area of erythematous mucosa at the anastomosis perianal digital rectal exam was normal.  Terminal ileum biopsy was normal.  Colon biopsy noted no significant histopathologic abnormality no evidence of dysplasia carcinoma or activity repeat recommended in 1 year for surveillance.       The following portions of the patient's history were reviewed and updated as appropriate:   Past Medical History:   Diagnosis Date   • Backache     of indeterminate etiology   • Cobalamin deficiency    • Crohn's disease (CMS/HCC)     With ileocolic stricture now resected   • Crohn's disease, small intestine (CMS/HCC)    • Drug therapy     long term   • GERD (gastroesophageal reflux disease)    • Partial obstruction of small intestine (CMS/HCC)    • Pharyngitis    • Portal hypertension (CMS/HCC)    • Portal vein thrombosis     And splenic vein   • Rhinitis    • Tobacco dependence syndrome     encouraged to stop   • Upper respiratory infection      Past Surgical History:   Procedure Laterality Date   • COLECTOMY PARTIAL / TOTAL      Resection of segment of sigmoid colon, resection of 45 cm of ileum, right colon, with ileotransverse colon anastomosis; resection of segment of bladder, suprapubic cystostomy. 03/28/1984       • COLONOSCOPY  07/02/2015   • COLONOSCOPY N/A 2/22/2019    Procedure: COLONOSCOPY;  Surgeon:  Dewayne Beavers MD;  Location: [facility] ENDOSCOPY;  Service: Gastroenterology   • COLONOSCOPY N/A 9/23/2020    Procedure: COLONOSCOPY WED;  Surgeon: Dewayne Beavers MD;  Location: [facility] ENDOSCOPY;  Service: Gastroenterology;  Laterality: N/A;   • ENDOSCOPY      Prior resect of cecum & ascend colon found.Muoosa beyond strict has a normal appear.Stenosis of anastomosis, will not allow pass of scope.Linear ulcerat of anastomosis.Narrow strict & not long strict as reported by xray. Dilatation performed. 6/26/2014       • ENDOSCOPY      Normal hypopharynx, esophagus, dudoenum, symmetrical & patent pylorus. Hiatus hernia in GE junction. Mild non-erosive gastritis in antrum. Biopsy taken. 01/31/2013       • EXPLORATORY LAPAROTOMY      Exploratory laparotomy with adhesiolysis.Takedown of previous anastomosis.Resection of terminal ileum and proximal transverse colon w/a stapled CODY anastomosis between ileum and transverse colon. 03/07/2016       • SPLENECTOMY       Splenomegaly and hypersplenism 11/02/2007    • UPPER GASTROINTESTINAL ENDOSCOPY  01/31/2013     Family History   Problem Relation Age of Onset   • Cancer Other        Prior to Admission medications    Medication Sig Start Date End Date Taking? Authorizing Provider   adalimumab (HUMIRA) 40 MG/0.8ML Prefilled Syringe Kit injection Inject 40 mg under the skin every 14 (fourteen) days.   Yes Chandrika Bocanegra MD   aspirin 81 MG EC tablet Take 81 mg by mouth daily.   Yes Chandrika Bocanegra MD   hydrOXYzine (ATARAX) 10 MG tablet Take 10 mg by mouth At Night As Needed. 4/7/21  Yes Chandrika Bocanegra MD   omeprazole (priLOSEC) 20 MG capsule Take  by mouth 2 (two) times a day.   Yes Chandrika Bocanegra MD     No Known Allergies  Social History     Socioeconomic History   • Marital status:      Spouse name: Not on file   • Number of children: Not on file   • Years of education: Not on file   • Highest education level: Not on file   Tobacco Use   •  "Smoking status: Current Every Day Smoker     Packs/day: 1.00     Types: Cigarettes   • Smokeless tobacco: Never Used   Substance and Sexual Activity   • Alcohol use: No   • Drug use: No   • Sexual activity: Defer     Comment:        Review of Systems  Review of Systems   Constitutional: Negative for activity change, appetite change, chills, diaphoresis, fatigue, fever and unexpected weight change.   Respiratory: Negative for cough and shortness of breath.    Gastrointestinal: Positive for abdominal pain and diarrhea. Negative for abdominal distention, anal bleeding, blood in stool, constipation, nausea, rectal pain and vomiting.        /85 (BP Location: Left arm)   Pulse 74   Ht 177.8 cm (70\")   Wt 90.5 kg (199 lb 9.6 oz)   BMI 28.64 kg/m²     Objective    Physical Exam  Constitutional:       General: He is not in acute distress.     Appearance: Normal appearance. He is well-developed.   HENT:      Head: Normocephalic and atraumatic.   Neck:      Thyroid: No thyromegaly.   Cardiovascular:      Rate and Rhythm: Normal rate and regular rhythm.      Heart sounds: Normal heart sounds.   Pulmonary:      Effort: Pulmonary effort is normal.      Breath sounds: Normal breath sounds. No wheezing, rhonchi or rales.   Abdominal:      General: Bowel sounds are normal. There is no distension.      Palpations: Abdomen is soft. Abdomen is not rigid.      Tenderness: There is generalized abdominal tenderness. There is no guarding.      Hernia: No hernia is present.   Musculoskeletal:      Cervical back: Normal range of motion and neck supple.   Lymphadenopathy:      Cervical: No cervical adenopathy.   Skin:     General: Skin is warm and dry.      Coloration: Skin is not pale.      Findings: No rash.   Neurological:      Mental Status: He is alert and oriented to person, place, and time.   Psychiatric:         Speech: Speech normal.         Behavior: Behavior is cooperative.       Admission on 09/23/2020, Discharged " on 09/23/2020   Component Date Value Ref Range Status   • COVID19 09/20/2020 Not Detected  Not Detected - Ref. Range Final   • Case Report 09/23/2020    Final                    Value:Surgical Pathology Report                         Case: [id number]                                  Authorizing Provider:  Dewayne Beavers MD        Collected:           09/23/2020 10:44 AM          Ordering Location:     [facility]             Received:            09/23/2020 01:16 PM                                 [place] [facility]                                                     Pathologist:           Asim Ronquillo MD                                                       Specimens:   1) - Small Intestine, Ileum, ti bx                                                                  2) - Large Intestine, colonic mucosa bx                                                   • Final Diagnosis 09/23/2020    Final                    Value:This result contains rich text formatting which cannot be displayed here.     Assessment/Plan      1. Crohn's disease of small intestine without complication (CMS/HCC)    2. Vitamin B12 deficiency    3. Gastroesophageal reflux disease with esophagitis without hemorrhage    .   Continue Humira every other week and B12 injections monthly, fax sent to PCP for injections to be given there.  Continue omeprazole for reflux.  Will obtain lab work today for surveillance.  Discussed importance of balanced diet.  Follow-up in 6 months for recheck return office sooner if needed    Orders placed during this encounter include:  Orders Placed This Encounter   Procedures   • CBC (No Diff)     Order Specific Question:   Release to patient     Answer:   Immediate   • Comprehensive Metabolic Panel     Order Specific Question:   Release to patient     Answer:   Immediate   • Vitamin B12     Order Specific Question:   Release to patient     Answer:   Immediate   • Vitamin D 25 Hydroxy     Order Specific  Question:   Release to patient     Answer:   Immediate   • Iron Profile       * Surgery not found *    Review and/or summary of lab tests, radiology, procedures, medications. Review and summary of old records and obtaining of history. The risks and benefits of my recommendations, as well as other treatment options were discussed with the patient today. Questions were answered.    New Medications Ordered This Visit   Medications   • cyanocobalamin injection 500 mcg       Follow-up: Return in about 6 months (around 11/4/2021).          This document has been electronically signed by VEENA Cantu on May 7, 2021 14:29 CDT           I spent 15 minutes caring for Jose on this date of service. This time includes time spent by me in the following activities:preparing for the visit, reviewing tests, obtaining and/or reviewing a separately obtained history, performing a medically appropriate examination and/or evaluation , counseling and educating the patient/family/caregiver, ordering medications, tests, or procedures, referring and communicating with other health care professionals , documenting information in the medical record and care coordination    Results for orders placed or performed in visit on 05/04/21   Iron Profile    Specimen: Blood   Result Value Ref Range    Iron 121 59 - 158 mcg/dL    Iron Saturation 26 20 - 50 %    Transferrin 312 200 - 360 mg/dL    TIBC 465 298 - 536 mcg/dL   Vitamin D 25 Hydroxy    Specimen: Blood   Result Value Ref Range    25 Hydroxy, Vitamin D 41.3 ng/ml   CBC (No Diff)    Specimen: Blood   Result Value Ref Range    WBC 9.84 3.40 - 10.80 10*3/mm3    RBC 4.95 4.14 - 5.80 10*6/mm3    Hemoglobin 16.1 13.0 - 17.7 g/dL    Hematocrit 47.5 37.5 - 51.0 %    MCV 96.0 79.0 - 97.0 fL    MCH 32.5 26.6 - 33.0 pg    MCHC 33.9 31.5 - 35.7 g/dL    RDW 14.6 12.3 - 15.4 %    RDW-SD 51.4 37.0 - 54.0 fl    MPV 11.4 6.0 - 12.0 fL    Platelets 338 140 - 450 10*3/mm3   Vitamin B12    Specimen:  Blood   Result Value Ref Range    Vitamin B-12 174 (L) 211 - 946 pg/mL   Comprehensive Metabolic Panel    Specimen: Blood   Result Value Ref Range    Glucose 85 65 - 99 mg/dL    BUN 7 6 - 20 mg/dL    Creatinine 0.95 0.76 - 1.27 mg/dL    Sodium 140 136 - 145 mmol/L    Potassium 4.1 3.5 - 5.2 mmol/L    Chloride 103 98 - 107 mmol/L    CO2 28.0 22.0 - 29.0 mmol/L    Calcium 9.3 8.6 - 10.5 mg/dL    Total Protein 8.4 6.0 - 8.5 g/dL    Albumin 4.40 3.50 - 5.20 g/dL    ALT (SGPT) 20 1 - 41 U/L    AST (SGOT) 25 1 - 40 U/L    Alkaline Phosphatase 103 39 - 117 U/L    Total Bilirubin 0.6 0.0 - 1.2 mg/dL    eGFR Non African Amer 83 >60 mL/min/1.73    Globulin 4.0 gm/dL    A/G Ratio 1.1 g/dL    BUN/Creatinine Ratio 7.4 7.0 - 25.0    Anion Gap 9.0 5.0 - 15.0 mmol/L   Results for orders placed or performed during the hospital encounter of 09/23/20   COVID-19, [facility] IN-HOUSE, NP SWAB IN TRANSPORT MEDIA 8-10 HR TAT - Swab, Nasopharynx    Specimen: Nasopharynx; Swab   Result Value Ref Range    COVID19 Not Detected Not Detected - Ref. Range   Tissue Pathology Exam    Specimen: A: Small Intestine, Ileum; Tissue    B: Large Intestine; Tissue   Result Value Ref Range    Case Report       Surgical Pathology Report                         Case: [id number]                                  Authorizing Provider:  Dewayne Beavers MD        Collected:           09/23/2020 10:44 AM          Ordering Location:     [facility]             Received:            09/23/2020 01:16 PM                                 [place] [facility]                                                     Pathologist:           Asim Ronquillo MD                                                       Specimens:   1) - Small Intestine, Ileum, ti bx                                                                  2) - Large Intestine, colonic mucosa bx                                                    Final Diagnosis       SEE SCANNED REPORT       Results for  orders placed or performed in visit on 03/05/20   Iron Profile    Specimen: Blood   Result Value Ref Range    Iron 76 59 - 158 mcg/dL    Iron Saturation 24 20 - 50 %    Transferrin 209 200 - 360 mg/dL    TIBC 311 298 - 536 mcg/dL   CBC (No Diff)    Specimen: Blood   Result Value Ref Range    WBC 11.81 (H) 3.40 - 10.80 10*3/mm3    RBC 4.59 4.14 - 5.80 10*6/mm3    Hemoglobin 14.7 13.0 - 17.7 g/dL    Hematocrit 42.3 37.5 - 51.0 %    MCV 92.2 79.0 - 97.0 fL    MCH 32.0 26.6 - 33.0 pg    MCHC 34.8 31.5 - 35.7 g/dL    RDW 13.1 12.3 - 15.4 %    RDW-SD 43.9 37.0 - 54.0 fl    MPV 11.7 6.0 - 12.0 fL    Platelets 341 140 - 450 10*3/mm3   Vitamin B12    Specimen: Blood   Result Value Ref Range    Vitamin B-12 242 211 - 946 pg/mL   Results for orders placed or performed in visit on 03/05/20   QuantiFERON TB Plus Client Incubated    Specimen: Blood   Result Value Ref Range    QuantiFERON Criteria Comment     QUANTIFERON TB1 AG VALUE 0.02 IU/mL    QUANTIFERON TB2 AG VALUE 0.02 IU/mL    QuantiFERON Nil Value 0.02 IU/mL    QuantiFERON Mitogen Value >10.00 IU/mL    QUANTIFERON-TB GOLD PLUS Negative Negative   Results for orders placed or performed in visit on 03/05/19   CBC Auto Differential    Specimen: Blood   Result Value Ref Range    WBC 13.75 (H) 3.40 - 10.80 10*3/mm3    RBC 4.92 4.14 - 5.80 10*6/mm3    Hemoglobin 15.2 13.0 - 17.7 g/dL    Hematocrit 45.0 37.5 - 51.0 %    MCV 91.5 79.0 - 97.0 fL    MCH 30.9 26.6 - 33.0 pg    MCHC 33.8 31.5 - 35.7 g/dL    RDW 14.9 12.3 - 15.4 %    RDW-SD 50.3 37.0 - 54.0 fl    MPV 10.9 6.0 - 12.0 fL    Platelets 416 140 - 450 10*3/mm3    Neutrophil % 54.4 42.7 - 76.0 %    Lymphocyte % 33.6 19.6 - 45.3 %    Monocyte % 6.1 5.0 - 12.0 %    Eosinophil % 4.7 0.3 - 6.2 %    Basophil % 0.8 0.0 - 1.5 %    Immature Grans % 0.4 0.0 - 0.5 %    Neutrophils, Absolute 7.49 (H) 1.40 - 7.00 10*3/mm3    Lymphocytes, Absolute 4.62 (H) 0.70 - 3.10 10*3/mm3    Monocytes, Absolute 0.84 0.10 - 0.90 10*3/mm3     Eosinophils, Absolute 0.64 (H) 0.00 - 0.40 10*3/mm3    Basophils, Absolute 0.11 0.00 - 0.20 10*3/mm3    Immature Grans, Absolute 0.05 0.00 - 0.05 10*3/mm3    nRBC 0.0 0.0 - 0.0 /100 WBC   Comprehensive Metabolic Panel    Specimen: Blood   Result Value Ref Range    Glucose 92 60 - 100 mg/dL    BUN 7 7 - 21 mg/dL    Creatinine 0.81 0.70 - 1.30 mg/dL    Sodium 137 137 - 145 mmol/L    Potassium 3.7 3.5 - 5.1 mmol/L    Chloride 103 95 - 110 mmol/L    CO2 29.0 22.0 - 31.0 mmol/L    Calcium 9.1 8.4 - 10.2 mg/dL    Total Protein 7.5 6.3 - 8.6 g/dL    Albumin 4.10 3.40 - 4.80 g/dL    ALT (SGPT) 29 21 - 72 U/L    AST (SGOT) 31 17 - 59 U/L    Alkaline Phosphatase 78 38 - 126 U/L    Total Bilirubin 0.9 0.2 - 1.3 mg/dL    eGFR Non  Amer 100 56 - 130 mL/min/1.73    Globulin 3.4 2.3 - 3.5 gm/dL    A/G Ratio 1.2 1.1 - 1.8 g/dL    BUN/Creatinine Ratio 8.6 7.0 - 25.0    Anion Gap 5.0 5.0 - 15.0 mmol/L     *Note: Due to a large number of results and/or encounters for the requested time period, some results have not been displayed. A complete set of results can be found in Results Review.

## 2021-05-04 NOTE — PATIENT INSTRUCTIONS
Crohn's Disease    Crohn's disease is a long-lasting (chronic) disease that affects the gastrointestinal (GI) tract. Crohn's disease often causes irritation and inflammation in the small intestine and the beginning of the large intestine, but it can affect any part of the GI tract. Crohn's disease is part of a group of illnesses that are known as inflammatory bowel disease (IBD).  Crohn's disease may start slowly and get worse over time. Symptoms may come and go. They may also go away for months or even years at a time (remission).  What are the causes?  The exact cause of this condition is not known. It may involve a response that causes your body's disease-fighting (immune) system to attack healthy cells and tissues (autoimmune response). Bacteria, genes, and your environment may also play a role.  What increases the risk?  The following factors may make you more likely to develop this condition:  · Having a family member who has Crohn's disease, another IBD, or an autoimmune condition.  · Using products that contain nicotine or tobacco, such as cigarettes and e-cigarettes.  · Being in your 20s.  · Having Eastern  ancestry.  What are the signs or symptoms?  The main symptoms of this condition involve your GI tract. These include:  · Diarrhea.  · Pain or cramping in the abdomen. This is commonly felt in the lower right side of the abdomen.  · Frequent watery or bloody stools.  · Constipation. This may mean having:  ? Fewer bowel movements in a week than normal.  ? Difficulty having a bowel movement.  ? Stools that are dry, hard, or larger than normal.  · Rectal bleeding.  · Rectal pain.  · An urgent need to have a bowel movement.  · The feeling that you are not finished having a bowel movement.  Other symptoms may include:  · Unexplained weight loss.  · Fatigue.  · Fever.  · Nausea.  · Loss of appetite.  · Joint pain.  · Vision changes.  · Red bumps or sores on the skin.  · Sores inside the mouth.  How is  this diagnosed?  This condition may be diagnosed based on:  · Your symptoms and your medical history.  · A physical exam.  · Tests, which may include:  ? Blood tests.  ? Stool sample tests.  ? Imaging tests, such as X-rays and CT scans.  ? Tests to examine the inside of your intestines using a long, flexible tube that has a light and a camera on the end (endoscopy or colonoscopy).  ? A procedure to remove tissue samples from inside your bowel for testing (biopsy).  You may need to work with a health care provider who specializes in diseases of the digestive tract (gastroenterologist).  How is this treated?  There is no cure for this condition, but treatment can help you manage your symptoms. Crohn's disease affects each person differently. Your treatment may include:  · Resting your bowels. This involves having a period of healing time when your bowels are not passing stools. This may be done by:  ? Drinking only clear liquids. These are liquids that you can see through, such as water, black coffee, fruit juice without pulp, broth, gelatin, and ice pops.  ? Getting nutrition through an IV for a period of time.  · Medicines. These may be used by themselves or with other treatments (combination therapy). These may include antibiotic medicines. You may be given medicines that help to:  ? Reduce inflammation.  ? Control your immune system activity.  ? Fight infections.  ? Relieve cramps and prevent diarrhea.  ? Control your pain.  · Surgery. You may need surgery if:  ? Medicines and other treatments are not working anymore.  ? You develop complications from severe Crohn's disease.  ? A section of your intestine becomes so damaged that it needs to be removed.  Follow these instructions at home:  Medicines  · Take over-the-counter and prescription medicines only as told by your health care provider.  · If you were prescribed an antibiotic, take it as told by your health care provider. Do not stop taking the antibiotic  even if you start to feel better.  · Avoid taking ibuprofen or other NSAID medicines if possible, these can make Crohn's disease worse.  Eating and drinking  · Talk with your health care provider or a diet and nutrition specialist (registered dietitian) about what diet is best for you.  · Drink enough fluid to keep your urine pale yellow.  · If you are taking steroids to reduce inflammation, get plenty of calcium in your diet to help keep your bones healthy. You may also consider taking a calcium supplement with vitamin D.  · Keep a food diary to identify foods that make your symptoms better or worse.  · Avoid foods that cause symptoms.  · Follow instructions from your health care provider about eating or drinking restrictions if you have worsening symptoms (flare-up).  · Limit alcohol intake to no more than 1 drink a day for nonpregnant women and 2 drinks a day for men. One drink equals 12 oz of beer, 5 oz of wine, or 1½ oz of hard liquor.  General instructions  · Make sure you get all the vaccines that your health care provider recommends, especially pneumonia (pneumococcal) and flu (influenza) vaccines.  · Do not use any products that contain nicotine or tobacco, such as cigarettes and e-cigarettes. If you need help quitting, ask your health care provider.  · Exercise every day, or as often told by your health care provider.  · Keep all follow-up visits as told by your health care provider. This is important.  Contact a health care provider if:  · You have diarrhea, cramps in your abdomen, and other GI problems that are present almost all the time.  · Your symptoms do not improve with treatment.  · You continue to lose weight.  · You develop a rash or sores on your skin.  · You develop eye problems.  · You have a fever.  · Your symptoms get worse.  · You develop new symptoms.  Get help right away if:  · You have bloody diarrhea.  · You have severe pain in your abdomen.  · You cannot pass  stools.  Summary  · Crohn's disease affects each person differently. There are multiple treatment options that can help you manage the condition.  · Talk with your health care provider or diet and nutrition specialist (registered dietitian) about what diet is best for you.  · Make sure you get all the vaccines that your health care provider recommends, especially pneumonia (pneumococcal) and flu (influenza) vaccines.  This information is not intended to replace advice given to you by your health care provider. Make sure you discuss any questions you have with your health care provider.  Document Revised: 11/30/2018 Document Reviewed: 08/20/2018  Elsevier Patient Education © 2021 [employer]

## 2021-05-07 ENCOUNTER — TELEPHONE (OUTPATIENT)
Dept: GASTROENTEROLOGY | Facility: CLINIC | Age: 56
End: 2021-05-07

## 2021-05-07 LAB
GAMMA INTERFERON BACKGROUND BLD IA-ACNC: 0.02 IU/ML
M TB IFN-G BLD-IMP: NEGATIVE
M TB IFN-G CD4+ BCKGRND COR BLD-ACNC: 0 IU/ML
M TB IFN-G CD4+CD8+ BCKGRND COR BLD-ACNC: 0 IU/ML
MITOGEN IGNF BLD-ACNC: >10 IU/ML
SERVICE CMNT-IMP: NORMAL

## 2021-05-07 NOTE — TELEPHONE ENCOUNTER
Relayed results to patient. Patient states he is getting monthly injections.           ----- Message from VEENA Starkey sent at 5/5/2021  4:38 PM CDT -----  Please call patient with results. Labs look good. B-12 is still low make sure he is able to continue injection monthly at [facility].

## 2021-08-03 RX ORDER — OMEPRAZOLE 20 MG/1
CAPSULE, DELAYED RELEASE ORAL
Qty: 180 CAPSULE | Refills: 1 | Status: SHIPPED | OUTPATIENT
Start: 2021-08-03 | End: 2021-12-07

## 2021-10-12 RX ORDER — ADALIMUMAB 40MG/0.8ML
40 KIT SUBCUTANEOUS
Qty: 2 EACH | Refills: 11 | Status: SHIPPED | OUTPATIENT
Start: 2021-10-12 | End: 2021-11-15 | Stop reason: SDUPTHER

## 2021-10-14 ENCOUNTER — TELEPHONE (OUTPATIENT)
Dept: GASTROENTEROLOGY | Facility: CLINIC | Age: 56
End: 2021-10-14

## 2021-11-04 ENCOUNTER — LAB (OUTPATIENT)
Dept: LAB | Facility: HOSPITAL | Age: 56
End: 2021-11-04

## 2021-11-04 ENCOUNTER — APPOINTMENT (OUTPATIENT)
Dept: LAB | Facility: HOSPITAL | Age: 56
End: 2021-11-04

## 2021-11-04 ENCOUNTER — OFFICE VISIT (OUTPATIENT)
Dept: GASTROENTEROLOGY | Facility: CLINIC | Age: 56
End: 2021-11-04

## 2021-11-04 VITALS
DIASTOLIC BLOOD PRESSURE: 90 MMHG | WEIGHT: 195.8 LBS | HEIGHT: 70 IN | SYSTOLIC BLOOD PRESSURE: 133 MMHG | HEART RATE: 76 BPM | BODY MASS INDEX: 28.03 KG/M2

## 2021-11-04 DIAGNOSIS — K21.00 GASTROESOPHAGEAL REFLUX DISEASE WITH ESOPHAGITIS WITHOUT HEMORRHAGE: ICD-10-CM

## 2021-11-04 DIAGNOSIS — K50.00 CROHN'S DISEASE OF SMALL INTESTINE WITHOUT COMPLICATION (HCC): Primary | ICD-10-CM

## 2021-11-04 LAB
ALBUMIN SERPL-MCNC: 4.7 G/DL (ref 3.5–5.2)
ALBUMIN/GLOB SERPL: 1.7 G/DL
ALP SERPL-CCNC: 103 U/L (ref 39–117)
ALT SERPL W P-5'-P-CCNC: 19 U/L (ref 1–41)
ANION GAP SERPL CALCULATED.3IONS-SCNC: 9 MMOL/L (ref 5–15)
AST SERPL-CCNC: 20 U/L (ref 1–40)
BILIRUB SERPL-MCNC: 0.5 MG/DL (ref 0–1.2)
BUN SERPL-MCNC: 5 MG/DL (ref 6–20)
BUN/CREAT SERPL: 5.4 (ref 7–25)
CALCIUM SPEC-SCNC: 9.3 MG/DL (ref 8.6–10.5)
CHLORIDE SERPL-SCNC: 103 MMOL/L (ref 98–107)
CO2 SERPL-SCNC: 27 MMOL/L (ref 22–29)
CREAT SERPL-MCNC: 0.93 MG/DL (ref 0.76–1.27)
DEPRECATED RDW RBC AUTO: 57.2 FL (ref 37–54)
ERYTHROCYTE [DISTWIDTH] IN BLOOD BY AUTOMATED COUNT: 16.2 % (ref 12.3–15.4)
GFR SERPL CREATININE-BSD FRML MDRD: 85 ML/MIN/1.73
GLOBULIN UR ELPH-MCNC: 2.8 GM/DL
GLUCOSE SERPL-MCNC: 99 MG/DL (ref 65–99)
HCT VFR BLD AUTO: 43.4 % (ref 37.5–51)
HGB BLD-MCNC: 15.2 G/DL (ref 13–17.7)
MCH RBC QN AUTO: 33.5 PG (ref 26.6–33)
MCHC RBC AUTO-ENTMCNC: 35 G/DL (ref 31.5–35.7)
MCV RBC AUTO: 95.6 FL (ref 79–97)
PLATELET # BLD AUTO: 389 10*3/MM3 (ref 140–450)
PMV BLD AUTO: 10.2 FL (ref 6–12)
POTASSIUM SERPL-SCNC: 3.7 MMOL/L (ref 3.5–5.2)
PROT SERPL-MCNC: 7.5 G/DL (ref 6–8.5)
RBC # BLD AUTO: 4.54 10*6/MM3 (ref 4.14–5.8)
SODIUM SERPL-SCNC: 139 MMOL/L (ref 136–145)
WBC # BLD AUTO: 10.63 10*3/MM3 (ref 3.4–10.8)

## 2021-11-04 PROCEDURE — 99213 OFFICE O/P EST LOW 20 MIN: CPT | Performed by: NURSE PRACTITIONER

## 2021-11-04 PROCEDURE — 85027 COMPLETE CBC AUTOMATED: CPT | Performed by: NURSE PRACTITIONER

## 2021-11-04 PROCEDURE — 36415 COLL VENOUS BLD VENIPUNCTURE: CPT | Performed by: NURSE PRACTITIONER

## 2021-11-04 PROCEDURE — 80053 COMPREHEN METABOLIC PANEL: CPT | Performed by: NURSE PRACTITIONER

## 2021-11-04 RX ORDER — DEXTROSE AND SODIUM CHLORIDE 5; .45 G/100ML; G/100ML
30 INJECTION, SOLUTION INTRAVENOUS CONTINUOUS PRN
Status: CANCELLED | OUTPATIENT
Start: 2021-11-30

## 2021-11-04 NOTE — PATIENT INSTRUCTIONS
Smoking and Musculoskeletal Health  Smoking is bad for your health. Most people know that smoking causes lung disease, heart disease, and cancer. But people may not realize that it also affects their bones, muscles, and joints (musculoskeletal system). When you smoke, the effects on your lungs and heart result in less oxygen for your musculoskeletal system. This can lead to poor bone and joint health.  How can smoking affect my musculoskeletal health?  Smoking can:  · Increase your risk of having weak, thin bones (osteoporosis). Elderly smokers are at higher risk for bone fractures related to osteoporosis.  · Decrease the ability of bone-forming cells to make and replace bone (in addition to reducing oxygen and blood flow).  · Reduce your body's ability to absorb calcium from your diet. Less calcium means weaker bones.  · Interfere with the breakdown of the female hormone estrogen. Smoking lowers estrogen, which is a hormone that helps keep bones strong. Women who smoke may have earlier menopause. Menopause is a risk factor for osteoporosis.  · Weaken the tissues that attach bones to muscles (tendons). This can lead to shoulder, back, and other joint injuries.  · Increase your risk of rheumatoid arthritis or make the condition worse if you already have it.  · Slow down healing and increase your risk of infection and other complications if you have a bone fracture or surgery that involves your musculoskeletal system.  · Make you get out of breath easily. This can keep you from getting the exercise you need to keep your bones and joints healthy.  · Decrease your appetite and body mass. You may lose weight and muscle strength. This can put you at higher risk for muscle injury, joint injury, and broken bones.  What actions can I take to prevent musculoskeletal problems?  Quit smoking         · Do not start smoking. Quit if you already do. Even stopping later in life can improve musculoskeletal health.  · Do not use any  products that contain nicotine or tobacco. Do not replace cigarette smoking with e-cigarettes. The safety of e-cigarettes is not known, and some may contain harmful chemicals.  · Make a plan to quit smoking and commit to it. Look for programs to help you, and ask your health care provider for recommendations and ideas.  · Talk with your health care provider about using nicotine replacement medicines to help you quit, such as gum, lozenges, patches, sprays, or pills.  Make other lifestyle changes    · Eat a healthy diet that includes calcium and vitamin D. These nutrients are important for bone health.  ? Calcium is found in dairy foods and green leafy vegetables.  ? Vitamin D is found in eggs, fish, and liver.  ? Many foods also have vitamin D and calcium added to them (are fortified).  ? Ask your health care provider if you would benefit from taking a supplement.  · Get out in the sunshine for a short time every day. This increases production of vitamin D.  · Get 30 minutes of exercise at least 5 days a week. Weight-bearing and strength exercises are best for musculoskeletal health. Ask your health care provider what type of exercise is safe for you.  · Do not drink alcohol if:  ? Your health care provider tells you not to drink.  ? You are pregnant, may be pregnant, or are planning to become pregnant.  · If you drink alcohol, limit how much you have:  ? 0-1 drink a day for women.  ? 0-2 drinks a day for men.  · Be aware of how much alcohol is in your drink. In the [place] one drink equals one 12 oz bottle of beer (355 mL), one 5 oz glass of wine (148 mL), or one 1½ oz glass of hard liquor (44 mL).    Where to find more information  You may find more information about smoking, musculoskeletal health, and quitting smoking from:  · American Academy of Orthopaedic Surgeons: orthoinfo.aaos.org  · National Institutes of Health, Osteoporosis and Related Bone Diseases National Resource Center:  bones.nih.gov  · HelpGuide.org: helpguide.org  · Smokefree.gov: smokefree.gov  · American Lung Association: lung.org  Contact a health care provider if:  · You need help to quit smoking.  Summary  · When you smoke, the effects on your lungs and heart result in less oxygen for your musculoskeletal system.  · Even stopping smoking later in life can improve musculoskeletal health.  · Do not use any products that contain nicotine or tobacco, such as cigarettes and e-cigarettes.  · If you need help quitting, ask your health care provider.  This information is not intended to replace advice given to you by your health care provider. Make sure you discuss any questions you have with your health care provider.  Document Revised: 09/11/2020 Document Reviewed: 04/15/2019  [employer] Patient Education © 2021 [employer]  BMI for Adults  What is BMI?  Body mass index (BMI) is a number that is calculated from a person's weight and height. BMI can help estimate how much of a person's weight is composed of fat. BMI does not measure body fat directly. Rather, it is an alternative to procedures that directly measure body fat, which can be difficult and expensive.  BMI can help identify people who may be at higher risk for certain medical problems.  What are BMI measurements used for?  BMI is used as a screening tool to identify possible weight problems. It helps determine whether a person is obese, overweight, a healthy weight, or underweight.  BMI is useful for:  · Identifying a weight problem that may be related to a medical condition or may increase the risk for medical problems.  · Promoting changes, such as changes in diet and exercise, to help reach a healthy weight. BMI screening can be repeated to see if these changes are working.  How is BMI calculated?  BMI involves measuring your weight in relation to your height. Both height and weight are measured, and the BMI is calculated from those numbers. This can be done either in  "English ([place] or metric measurements. Note that charts and online BMI calculators are available to help you find your BMI quickly and easily without having to do these calculations yourself.  To calculate your BMI in English ([place] measurements:    1. Measure your weight in pounds (lb).  2. Multiply the number of pounds by 703.  ? For example, for a person who weighs 180 lb, multiply that number by 703, which equals 126,540.  3. Measure your height in inches. Then multiply that number by itself to get a measurement called \"inches squared.\"  ? For example, for a person who is 70 inches tall, the \"inches squared\" measurement is 70 inches x 70 inches, which equals 4,900 inches squared.  4. Divide the total from step 2 (number of lb x 703) by the total from step 3 (inches squared): 126,540 ÷ 4,900 = 25.8. This is your BMI.    To calculate your BMI in metric measurements:  1. Measure your weight in kilograms (kg).  2. Measure your height in meters (m). Then multiply that number by itself to get a measurement called \"meters squared.\"  ? For example, for a person who is 1.75 m tall, the \"meters squared\" measurement is 1.75 m x 1.75 m, which is equal to 3.1 meters squared.  3. Divide the number of kilograms (your weight) by the meters squared number. In this example: 70 ÷ 3.1 = 22.6. This is your BMI.  What do the results mean?  BMI charts are used to identify whether you are underweight, normal weight, overweight, or obese. The following guidelines will be used:  · Underweight: BMI less than 18.5.  · Normal weight: BMI between 18.5 and 24.9.  · Overweight: BMI between 25 and 29.9.  · Obese: BMI of 30 or above.  Keep these notes in mind:  · Weight includes both fat and muscle, so someone with a muscular build, such as an athlete, may have a BMI that is higher than 24.9. In cases like these, BMI is not an accurate measure of body fat.  · To determine if excess body fat is the cause of a BMI of 25 or higher, further " assessments may need to be done by a health care provider.  · BMI is usually interpreted in the same way for men and women.  Where to find more information  For more information about BMI, including tools to quickly calculate your BMI, go to these websites:  · Centers for Disease Control and Prevention: www.cdc.gov  · American Heart Association: www.heart.org  · National Heart, Lung, and Blood [place]: www.nhlbi.nih.gov  Summary  · Body mass index (BMI) is a number that is calculated from a person's weight and height.  · BMI may help estimate how much of a person's weight is composed of fat. BMI can help identify those who may be at higher risk for certain medical problems.  · BMI can be measured using English measurements or metric measurements.  · BMI charts are used to identify whether you are underweight, normal weight, overweight, or obese.  This information is not intended to replace advice given to you by your health care provider. Make sure you discuss any questions you have with your health care provider.  Document Revised: 09/09/2020 Document Reviewed: 07/17/2020  [employer] Patient Education © 2021 [employer]

## 2021-11-04 NOTE — PROGRESS NOTES
Chief Complaint   Patient presents with   • Crohn's Disease       Subjective    Jose Trent is a 55 y.o. male. he is here today for follow-up.    55-year-old male presents for surveillance of Crohn disease and to plan colonoscopy. Denies any change in bowel movements or blood in his stool. Reports disease has been well controlled since starting Humira about 5 years ago. Previous colonoscopy in 2020 noted some erythematous mucosa at anastomosis biopsy did not note any active disease, dysplasia or carcinoma. Repeat recommended in 1 year for surveillance.    Crohn's Disease  This is a chronic problem. The current episode started more than 1 year ago (humira about 5 years ). The problem occurs daily. Associated symptoms include abdominal pain and fatigue. Pertinent negatives include no anorexia, arthralgias, change in bowel habit (5-6 per day ), chest pain, chills, congestion, coughing, diaphoresis, fever, headaches, joint swelling, myalgias, nausea, neck pain, numbness, sore throat or vomiting. The symptoms are aggravated by eating.     Reports reflux is well controlled with omeprazole daily.      Plan; schedule patient for colonoscopy for surveillance of Crohn's disease. Continue Humira patient assistance forms filled out and faxed in [place].     The following portions of the patient's history were reviewed and updated as appropriate:   Past Medical History:   Diagnosis Date   • Backache     of indeterminate etiology   • Cobalamin deficiency    • Crohn's disease (HCC)     With ileocolic stricture now resected   • Crohn's disease, small intestine (HCC)    • Drug therapy     long term   • GERD (gastroesophageal reflux disease)    • Partial obstruction of small intestine (HCC)    • Pharyngitis    • Portal hypertension (HCC)    • Portal vein thrombosis     And splenic vein   • Rhinitis    • Tobacco dependence syndrome     encouraged to stop   • Upper respiratory infection      Past Surgical  History:   Procedure Laterality Date   • COLECTOMY PARTIAL / TOTAL      Resection of segment of sigmoid colon, resection of 45 cm of ileum, right colon, with ileotransverse colon anastomosis; resection of segment of bladder, suprapubic cystostomy. 03/28/1984       • COLONOSCOPY  07/02/2015   • COLONOSCOPY N/A 2/22/2019    Procedure: COLONOSCOPY;  Surgeon: Dewayne Beavers MD;  Location: [facility] ENDOSCOPY;  Service: Gastroenterology   • COLONOSCOPY N/A 9/23/2020    Procedure: COLONOSCOPY WED;  Surgeon: Dewayne Beavers MD;  Location: [facility] ENDOSCOPY;  Service: Gastroenterology;  Laterality: N/A;   • ENDOSCOPY      Prior resect of cecum & ascend colon found.Muoosa beyond strict has a normal appear.Stenosis of anastomosis, will not allow pass of scope.Linear ulcerat of anastomosis.Narrow strict & not long strict as reported by xray. Dilatation performed. 6/26/2014       • ENDOSCOPY      Normal hypopharynx, esophagus, dudoenum, symmetrical & patent pylorus. Hiatus hernia in GE junction. Mild non-erosive gastritis in antrum. Biopsy taken. 01/31/2013       • EXPLORATORY LAPAROTOMY      Exploratory laparotomy with adhesiolysis.Takedown of previous anastomosis.Resection of terminal ileum and proximal transverse colon w/a stapled CODY anastomosis between ileum and transverse colon. 03/07/2016       • SPLENECTOMY       Splenomegaly and hypersplenism 11/02/2007    • UPPER GASTROINTESTINAL ENDOSCOPY  01/31/2013     Family History   Problem Relation Age of Onset   • Cancer Other        Prior to Admission medications    Medication Sig Start Date End Date Taking? Authorizing Provider   adalimumab (Humira) 40 MG/0.8ML Prefilled Syringe Kit injection Inject 0.8 mL under the skin into the appropriate area as directed Every 14 (Fourteen) Days. 10/12/21  Yes Torrie Paul APRN   aspirin 81 MG EC tablet Take 81 mg by mouth daily.   Yes Provider, MD Chandrika   hydrOXYzine (ATARAX) 10 MG tablet Take 10 mg by mouth At Night As Needed.  "4/7/21  Yes Provider, MD Chandrika   omeprazole (priLOSEC) 20 MG capsule TAKE 1 CAPSULE TWICE DAILY 8/3/21  Yes Torrie Paul APRN     No Known Allergies  Social History     Socioeconomic History   • Marital status:    Tobacco Use   • Smoking status: Current Every Day Smoker     Packs/day: 1.00     Types: Cigarettes   • Smokeless tobacco: Never Used   Vaping Use   • Vaping Use: Never used   Substance and Sexual Activity   • Alcohol use: Never   • Drug use: Never   • Sexual activity: Defer     Comment:        Review of Systems  Review of Systems   Constitutional: Positive for fatigue. Negative for activity change, appetite change, chills, diaphoresis, fever and unexpected weight change.   HENT: Negative for congestion, sore throat and trouble swallowing.    Respiratory: Negative for cough and shortness of breath.    Cardiovascular: Negative for chest pain.   Gastrointestinal: Positive for abdominal pain and diarrhea. Negative for abdominal distention, anal bleeding, anorexia, blood in stool, change in bowel habit (5-6 per day ), constipation, nausea, rectal pain and vomiting.   Musculoskeletal: Negative for arthralgias, joint swelling, myalgias and neck pain.   Skin: Negative for pallor.   Neurological: Negative for light-headedness, numbness and headaches.        /90 (BP Location: Other (Comment), Patient Position: Sitting) Comment (BP Location): left wrist-auto  Pulse 76   Ht 177.8 cm (70\")   Wt 88.8 kg (195 lb 12.8 oz)   BMI 28.09 kg/m²     Objective    Physical Exam  Constitutional:       General: He is not in acute distress.     Appearance: Normal appearance. He is normal weight. He is not ill-appearing.   HENT:      Head: Normocephalic and atraumatic.   Pulmonary:      Effort: Pulmonary effort is normal.   Abdominal:      General: Bowel sounds are normal. There is no distension.      Palpations: Abdomen is soft. There is no mass.      Tenderness: There is no abdominal tenderness. "   Neurological:      Mental Status: He is alert.       Lab on 05/04/2021   Component Date Value Ref Range Status   • QuantiFERON Criteria 05/04/2021 Comment   Final    The QuantiFERON-TB Gold Plus result is determined by subtracting  the Nil value from either TB antigen (Ag) tube. The mitogen tube  serves as a control for the test.   • QUANTIFERON TB1 AG VALUE 05/04/2021 0.00  IU/mL Final   • QUANTIFERON TB2 AG VALUE 05/04/2021 0.00  IU/mL Final   • QuantiFERON Nil Value 05/04/2021 0.02  IU/mL Final   • QuantiFERON Mitogen Value 05/04/2021 >10.00  IU/mL Final   • QUANTIFERON-TB GOLD PLUS 05/04/2021 Negative  Negative Final    The specimen received for QuantiFERON testing was incubated by the  ordering institution. Specific procedures outlined in our Directory  of Services and in the package insert for the QuantiFERON Gold  (In Tube) test must be followed to enable for proper stimulation of  cells for the production of interferon gamma.  Chemiluminescence immunoassay methodology     Assessment/Plan      1. Crohn's disease of small intestine without complication (HCC)    2. Gastroesophageal reflux disease with esophagitis without hemorrhage    .       Orders placed during this encounter include:  Orders Placed This Encounter   Procedures   • Comprehensive Metabolic Panel     Order Specific Question:   Release to patient     Answer:   Immediate   • CBC (No Diff)     Order Specific Question:   Release to patient     Answer:   Immediate   • Follow Anesthesia Guidelines / Standing Orders     Standing Status:   Future   • Obtain Informed Consent     Standing Status:   Future     Order Specific Question:   Informed Consent Given For     Answer:   COLONOSCOPY       COLONOSCOPY (N/A)    Review and/or summary of lab tests, radiology, procedures, medications. Review and summary of old records and obtaining of history. The risks and benefits of my recommendations, as well as other treatment options were discussed with the  patient today. Questions were answered.    New Medications Ordered This Visit   Medications   • polyethylene glycol (GoLYTELY) 236 g solution     Sig: Starting at noon on day prior to procedure, drink 8 ounces every 30 minutes until all gone or stools are clear. May add flavor packet.     Dispense:  4000 mL     Refill:  0       Follow-up: Return in about 6 months (around 5/4/2022) for Recheck.          This document has been electronically signed by VEENA Cantu on November 4, 2021 09:41 CDT           I spent 15 minutes caring for Jose on this date of service. This time includes time spent by me in the following activities:preparing for the visit, reviewing tests, obtaining and/or reviewing a separately obtained history, performing a medically appropriate examination and/or evaluation , counseling and educating the patient/family/caregiver, ordering medications, tests, or procedures, referring and communicating with other health care professionals , documenting information in the medical record and care coordination    Results for orders placed or performed in visit on 05/04/21   QuantiFERON TB Plus Client Incubated    Specimen: Blood   Result Value Ref Range    QuantiFERON Criteria Comment     QUANTIFERON TB1 AG VALUE 0.00 IU/mL    QUANTIFERON TB2 AG VALUE 0.00 IU/mL    QuantiFERON Nil Value 0.02 IU/mL    QuantiFERON Mitogen Value >10.00 IU/mL    QUANTIFERON-TB GOLD PLUS Negative Negative   Results for orders placed or performed in visit on 05/04/21   Iron Profile    Specimen: Blood   Result Value Ref Range    Iron 121 59 - 158 mcg/dL    Iron Saturation 26 20 - 50 %    Transferrin 312 200 - 360 mg/dL    TIBC 465 298 - 536 mcg/dL   Vitamin D 25 Hydroxy    Specimen: Blood   Result Value Ref Range    25 Hydroxy, Vitamin D 41.3 ng/ml   CBC (No Diff)    Specimen: Blood   Result Value Ref Range    WBC 9.84 3.40 - 10.80 10*3/mm3    RBC 4.95 4.14 - 5.80 10*6/mm3    Hemoglobin 16.1 13.0 - 17.7 g/dL     Hematocrit 47.5 37.5 - 51.0 %    MCV 96.0 79.0 - 97.0 fL    MCH 32.5 26.6 - 33.0 pg    MCHC 33.9 31.5 - 35.7 g/dL    RDW 14.6 12.3 - 15.4 %    RDW-SD 51.4 37.0 - 54.0 fl    MPV 11.4 6.0 - 12.0 fL    Platelets 338 140 - 450 10*3/mm3   Vitamin B12    Specimen: Blood   Result Value Ref Range    Vitamin B-12 174 (L) 211 - 946 pg/mL   Comprehensive Metabolic Panel    Specimen: Blood   Result Value Ref Range    Glucose 85 65 - 99 mg/dL    BUN 7 6 - 20 mg/dL    Creatinine 0.95 0.76 - 1.27 mg/dL    Sodium 140 136 - 145 mmol/L    Potassium 4.1 3.5 - 5.2 mmol/L    Chloride 103 98 - 107 mmol/L    CO2 28.0 22.0 - 29.0 mmol/L    Calcium 9.3 8.6 - 10.5 mg/dL    Total Protein 8.4 6.0 - 8.5 g/dL    Albumin 4.40 3.50 - 5.20 g/dL    ALT (SGPT) 20 1 - 41 U/L    AST (SGOT) 25 1 - 40 U/L    Alkaline Phosphatase 103 39 - 117 U/L    Total Bilirubin 0.6 0.0 - 1.2 mg/dL    eGFR Non African Amer 83 >60 mL/min/1.73    Globulin 4.0 gm/dL    A/G Ratio 1.1 g/dL    BUN/Creatinine Ratio 7.4 7.0 - 25.0    Anion Gap 9.0 5.0 - 15.0 mmol/L   Results for orders placed or performed during the hospital encounter of 09/23/20   COVID-19, [facility] IN-HOUSE, NP SWAB IN TRANSPORT MEDIA 8-10 HR TAT - Swab, Nasopharynx    Specimen: Nasopharynx; Swab   Result Value Ref Range    COVID19 Not Detected Not Detected - Ref. Range   Tissue Pathology Exam    Specimen: A: Small Intestine, Ileum; Tissue    B: Large Intestine; Tissue   Result Value Ref Range    Case Report       Surgical Pathology Report                         Case: [id number]                                  Authorizing Provider:  Dewayne Beavers MD        Collected:           09/23/2020 10:44 AM          [facility]ing [facility]             Received:            09/23/2020 01:16 PM                                 [facility]                                                     Pathologist:           Asim Ronquillo MD                                                        Specimens:   1) - Small Intestine, Ileum, ti bx                                                                  2) - Large Intestine, colonic mucosa bx                                                    Final Diagnosis       SEE SCANNED REPORT       Results for orders placed or performed in visit on 03/05/20   Iron Profile    Specimen: Blood   Result Value Ref Range    Iron 76 59 - 158 mcg/dL    Iron Saturation 24 20 - 50 %    Transferrin 209 200 - 360 mg/dL    TIBC 311 298 - 536 mcg/dL   CBC (No Diff)    Specimen: Blood   Result Value Ref Range    WBC 11.81 (H) 3.40 - 10.80 10*3/mm3    RBC 4.59 4.14 - 5.80 10*6/mm3    Hemoglobin 14.7 13.0 - 17.7 g/dL    Hematocrit 42.3 37.5 - 51.0 %    MCV 92.2 79.0 - 97.0 fL    MCH 32.0 26.6 - 33.0 pg    MCHC 34.8 31.5 - 35.7 g/dL    RDW 13.1 12.3 - 15.4 %    RDW-SD 43.9 37.0 - 54.0 fl    MPV 11.7 6.0 - 12.0 fL    Platelets 341 140 - 450 10*3/mm3   Vitamin B12    Specimen: Blood   Result Value Ref Range    Vitamin B-12 242 211 - 946 pg/mL   Results for orders placed or performed in visit on 03/05/20   QuantiFERON TB Plus Client Incubated    Specimen: Blood   Result Value Ref Range    QuantiFERON Criteria Comment     QUANTIFERON TB1 AG VALUE 0.02 IU/mL    QUANTIFERON TB2 AG VALUE 0.02 IU/mL    QuantiFERON Nil Value 0.02 IU/mL    QuantiFERON Mitogen Value >10.00 IU/mL    QUANTIFERON-TB GOLD PLUS Negative Negative   Results for orders placed or performed in visit on 03/05/19   CBC Auto Differential    Specimen: Blood   Result Value Ref Range    WBC 13.75 (H) 3.40 - 10.80 10*3/mm3    RBC 4.92 4.14 - 5.80 10*6/mm3    Hemoglobin 15.2 13.0 - 17.7 g/dL    Hematocrit 45.0 37.5 - 51.0 %    MCV 91.5 79.0 - 97.0 fL    MCH 30.9 26.6 - 33.0 pg    MCHC 33.8 31.5 - 35.7 g/dL    RDW 14.9 12.3 - 15.4 %    RDW-SD 50.3 37.0 - 54.0 fl    MPV 10.9 6.0 - 12.0 fL    Platelets 416 140 - 450 10*3/mm3    Neutrophil % 54.4 42.7 - 76.0 %    Lymphocyte % 33.6 19.6 - 45.3 %    Monocyte % 6.1 5.0 - 12.0 %     Eosinophil % 4.7 0.3 - 6.2 %    Basophil % 0.8 0.0 - 1.5 %    Immature Grans % 0.4 0.0 - 0.5 %    Neutrophils, Absolute 7.49 (H) 1.40 - 7.00 10*3/mm3    Lymphocytes, Absolute 4.62 (H) 0.70 - 3.10 10*3/mm3    Monocytes, Absolute 0.84 0.10 - 0.90 10*3/mm3    Eosinophils, Absolute 0.64 (H) 0.00 - 0.40 10*3/mm3    Basophils, Absolute 0.11 0.00 - 0.20 10*3/mm3    Immature Grans, Absolute 0.05 0.00 - 0.05 10*3/mm3    nRBC 0.0 0.0 - 0.0 /100 WBC   Comprehensive Metabolic Panel    Specimen: Blood   Result Value Ref Range    Glucose 92 60 - 100 mg/dL    BUN 7 7 - 21 mg/dL    Creatinine 0.81 0.70 - 1.30 mg/dL    Sodium 137 137 - 145 mmol/L    Potassium 3.7 3.5 - 5.1 mmol/L    Chloride 103 95 - 110 mmol/L    CO2 29.0 22.0 - 31.0 mmol/L    Calcium 9.1 8.4 - 10.2 mg/dL    Total Protein 7.5 6.3 - 8.6 g/dL    Albumin 4.10 3.40 - 4.80 g/dL    ALT (SGPT) 29 21 - 72 U/L    AST (SGOT) 31 17 - 59 U/L    Alkaline Phosphatase 78 38 - 126 U/L    Total Bilirubin 0.9 0.2 - 1.3 mg/dL    eGFR Non  Amer 100 56 - 130 mL/min/1.73    Globulin 3.4 2.3 - 3.5 gm/dL    A/G Ratio 1.2 1.1 - 1.8 g/dL    BUN/Creatinine Ratio 8.6 7.0 - 25.0    Anion Gap 5.0 5.0 - 15.0 mmol/L     *Note: Due to a large number of results and/or encounters for the requested time period, some results have not been displayed. A complete set of results can be found in Results Review.

## 2021-11-15 RX ORDER — ADALIMUMAB 40MG/0.8ML
40 KIT SUBCUTANEOUS
Qty: 2 EACH | Refills: 11 | Status: SHIPPED | OUTPATIENT
Start: 2021-11-15 | End: 2022-11-11 | Stop reason: SDUPTHER

## 2021-11-30 ENCOUNTER — ANESTHESIA EVENT (OUTPATIENT)
Dept: GASTROENTEROLOGY | Facility: HOSPITAL | Age: 56
End: 2021-11-30

## 2021-11-30 ENCOUNTER — ANESTHESIA (OUTPATIENT)
Dept: GASTROENTEROLOGY | Facility: HOSPITAL | Age: 56
End: 2021-11-30

## 2021-11-30 ENCOUNTER — HOSPITAL ENCOUNTER (OUTPATIENT)
Facility: HOSPITAL | Age: 56
Setting detail: HOSPITAL OUTPATIENT SURGERY
Discharge: HOME | End: 2021-11-30
Attending: INTERNAL MEDICINE | Admitting: INTERNAL MEDICINE

## 2021-11-30 VITALS
OXYGEN SATURATION: 91 % | BODY MASS INDEX: 27.37 KG/M2 | HEART RATE: 75 BPM | WEIGHT: 191.2 LBS | TEMPERATURE: 98.1 F | DIASTOLIC BLOOD PRESSURE: 79 MMHG | HEIGHT: 70 IN | SYSTOLIC BLOOD PRESSURE: 134 MMHG | RESPIRATION RATE: 18 BRPM

## 2021-11-30 DIAGNOSIS — K50.00 CROHN'S DISEASE OF SMALL INTESTINE WITHOUT COMPLICATION (HCC): ICD-10-CM

## 2021-11-30 PROCEDURE — 88305 TISSUE EXAM BY PATHOLOGIST: CPT

## 2021-11-30 PROCEDURE — 45380 COLONOSCOPY AND BIOPSY: CPT | Performed by: INTERNAL MEDICINE

## 2021-11-30 PROCEDURE — 25010000002 PROPOFOL 10 MG/ML EMULSION

## 2021-11-30 RX ORDER — NALOXONE HCL 0.4 MG/ML
0.4 VIAL (ML) INJECTION AS NEEDED
Status: DISCONTINUED | OUTPATIENT
Start: 2021-11-30 | End: 2021-11-30 | Stop reason: HOSPADM

## 2021-11-30 RX ORDER — ACETAMINOPHEN 325 MG/1
650 TABLET ORAL ONCE AS NEEDED
Status: DISCONTINUED | OUTPATIENT
Start: 2021-11-30 | End: 2021-11-30 | Stop reason: HOSPADM

## 2021-11-30 RX ORDER — ONDANSETRON 2 MG/ML
4 INJECTION INTRAMUSCULAR; INTRAVENOUS ONCE AS NEEDED
Status: DISCONTINUED | OUTPATIENT
Start: 2021-11-30 | End: 2021-11-30 | Stop reason: HOSPADM

## 2021-11-30 RX ORDER — DIPHENHYDRAMINE HYDROCHLORIDE 50 MG/ML
12.5 INJECTION INTRAMUSCULAR; INTRAVENOUS
Status: DISCONTINUED | OUTPATIENT
Start: 2021-11-30 | End: 2021-11-30 | Stop reason: HOSPADM

## 2021-11-30 RX ORDER — EPHEDRINE SULFATE 50 MG/ML
5 INJECTION, SOLUTION INTRAVENOUS ONCE AS NEEDED
Status: DISCONTINUED | OUTPATIENT
Start: 2021-11-30 | End: 2021-11-30 | Stop reason: HOSPADM

## 2021-11-30 RX ORDER — ACETAMINOPHEN 650 MG/1
650 SUPPOSITORY RECTAL ONCE AS NEEDED
Status: DISCONTINUED | OUTPATIENT
Start: 2021-11-30 | End: 2021-11-30 | Stop reason: HOSPADM

## 2021-11-30 RX ORDER — LIDOCAINE HYDROCHLORIDE 20 MG/ML
INJECTION, SOLUTION INTRAVENOUS AS NEEDED
Status: DISCONTINUED | OUTPATIENT
Start: 2021-11-30 | End: 2021-11-30 | Stop reason: SURG

## 2021-11-30 RX ORDER — FLUMAZENIL 0.1 MG/ML
0.2 INJECTION INTRAVENOUS AS NEEDED
Status: DISCONTINUED | OUTPATIENT
Start: 2021-11-30 | End: 2021-11-30 | Stop reason: HOSPADM

## 2021-11-30 RX ORDER — PROPOFOL 10 MG/ML
VIAL (ML) INTRAVENOUS AS NEEDED
Status: DISCONTINUED | OUTPATIENT
Start: 2021-11-30 | End: 2021-11-30 | Stop reason: SURG

## 2021-11-30 RX ORDER — PROMETHAZINE HYDROCHLORIDE 25 MG/1
25 TABLET ORAL ONCE AS NEEDED
Status: DISCONTINUED | OUTPATIENT
Start: 2021-11-30 | End: 2021-11-30 | Stop reason: HOSPADM

## 2021-11-30 RX ORDER — PROMETHAZINE HYDROCHLORIDE 25 MG/1
25 SUPPOSITORY RECTAL ONCE AS NEEDED
Status: DISCONTINUED | OUTPATIENT
Start: 2021-11-30 | End: 2021-11-30 | Stop reason: HOSPADM

## 2021-11-30 RX ORDER — DEXTROSE AND SODIUM CHLORIDE 5; .45 G/100ML; G/100ML
30 INJECTION, SOLUTION INTRAVENOUS CONTINUOUS PRN
Status: DISCONTINUED | OUTPATIENT
Start: 2021-11-30 | End: 2021-11-30 | Stop reason: HOSPADM

## 2021-11-30 RX ADMIN — LIDOCAINE HYDROCHLORIDE 50 MG: 20 INJECTION, SOLUTION INTRAVENOUS at 10:12

## 2021-11-30 RX ADMIN — PROPOFOL 20 MG: 10 INJECTION, EMULSION INTRAVENOUS at 10:14

## 2021-11-30 RX ADMIN — DEXTROSE AND SODIUM CHLORIDE 30 ML/HR: 5; 450 INJECTION, SOLUTION INTRAVENOUS at 09:31

## 2021-11-30 RX ADMIN — PROPOFOL 70 MG: 10 INJECTION, EMULSION INTRAVENOUS at 10:12

## 2021-11-30 RX ADMIN — PROPOFOL 20 MG: 10 INJECTION, EMULSION INTRAVENOUS at 10:18

## 2021-11-30 RX ADMIN — PROPOFOL 20 MG: 10 INJECTION, EMULSION INTRAVENOUS at 10:20

## 2021-11-30 RX ADMIN — PROPOFOL 20 MG: 10 INJECTION, EMULSION INTRAVENOUS at 10:16

## 2021-11-30 NOTE — ANESTHESIA POSTPROCEDURE EVALUATION
Patient: Jose Trent    Procedure Summary     Date: 11/30/21 Room / Location: [facility] ENDOSCOPY 1 / [facility] ENDOSCOPY    Anesthesia Start: 1009 Anesthesia Stop: 1030    Procedure: COLONOSCOPY (N/A ) Diagnosis:       Crohn's disease of small intestine without complication (HCC)      (Crohn's disease of small intestine without complication (HCC) [K50.00])    Surgeons: Dewayne Beavers MD Provider: Hali Sanches CRNA    Anesthesia Type: MAC ASA Status: 3          Anesthesia Type: MAC    Vitals  No vitals data found for the desired time range.          Post Anesthesia Care and Evaluation    Patient location during evaluation: PHASE II  Patient participation: complete - patient cannot participate  Level of consciousness: sleepy but conscious  Pain score: 0  Pain management: adequate  Airway patency: patent  Anesthetic complications: No anesthetic complications  PONV Status: none  Cardiovascular status: acceptable  Respiratory status: acceptable and room air  Hydration status: acceptable  No anesthesia care post op

## 2021-11-30 NOTE — ANESTHESIA PREPROCEDURE EVALUATION
Anesthesia Evaluation     Patient summary reviewed   NPO Solid Status: > 8 hours  NPO Liquid Status: > 4 hours           Airway   Mallampati: II  TM distance: >3 FB  Neck ROM: full  No difficulty expected  Dental    (+) lower dentures and upper dentures    Pulmonary - normal exam   (+) a smoker Current Abstained day of surgery,   Cardiovascular - negative cardio ROS and normal exam        Neuro/Psych- negative ROS  GI/Hepatic/Renal/Endo    (+)  GERD well controlled,      Musculoskeletal (-) negative ROS    Abdominal  - normal exam   Substance History - negative use     OB/GYN negative ob/gyn ROS         Other - negative ROS                     Anesthesia Plan    ASA 3     MAC     intravenous induction     Anesthetic plan, all risks, benefits, and alternatives have been provided, discussed and informed consent has been obtained with: patient.

## 2021-12-02 LAB
LAB AP CASE REPORT: NORMAL
PATH REPORT.FINAL DX SPEC: NORMAL

## 2021-12-07 RX ORDER — OMEPRAZOLE 20 MG/1
CAPSULE, DELAYED RELEASE ORAL
Qty: 180 CAPSULE | Refills: 1 | Status: SHIPPED | OUTPATIENT
Start: 2021-12-07 | End: 2022-05-04 | Stop reason: SDUPTHER

## 2022-05-04 ENCOUNTER — OFFICE VISIT (OUTPATIENT)
Dept: GASTROENTEROLOGY | Facility: CLINIC | Age: 57
End: 2022-05-04

## 2022-05-04 ENCOUNTER — LAB (OUTPATIENT)
Dept: LAB | Facility: HOSPITAL | Age: 57
End: 2022-05-04

## 2022-05-04 VITALS
DIASTOLIC BLOOD PRESSURE: 93 MMHG | BODY MASS INDEX: 27.94 KG/M2 | SYSTOLIC BLOOD PRESSURE: 144 MMHG | HEART RATE: 75 BPM | WEIGHT: 195.2 LBS | HEIGHT: 70 IN

## 2022-05-04 DIAGNOSIS — K50.00 CROHN'S DISEASE OF SMALL INTESTINE WITHOUT COMPLICATION: Primary | ICD-10-CM

## 2022-05-04 DIAGNOSIS — E53.8 VITAMIN B12 DEFICIENCY: ICD-10-CM

## 2022-05-04 DIAGNOSIS — K21.00 GASTROESOPHAGEAL REFLUX DISEASE WITH ESOPHAGITIS WITHOUT HEMORRHAGE: ICD-10-CM

## 2022-05-04 LAB
25(OH)D3 SERPL-MCNC: 34.9 NG/ML (ref 30–100)
ALBUMIN SERPL-MCNC: 4.2 G/DL (ref 3.5–5.2)
ALBUMIN/GLOB SERPL: 1.3 G/DL
ALP SERPL-CCNC: 84 U/L (ref 39–117)
ALT SERPL W P-5'-P-CCNC: 27 U/L (ref 1–41)
ANION GAP SERPL CALCULATED.3IONS-SCNC: 10.6 MMOL/L (ref 5–15)
AST SERPL-CCNC: 28 U/L (ref 1–40)
BILIRUB SERPL-MCNC: 0.7 MG/DL (ref 0–1.2)
BUN SERPL-MCNC: 9 MG/DL (ref 6–20)
BUN/CREAT SERPL: 9.3 (ref 7–25)
CALCIUM SPEC-SCNC: 9.2 MG/DL (ref 8.6–10.5)
CHLORIDE SERPL-SCNC: 103 MMOL/L (ref 98–107)
CO2 SERPL-SCNC: 23.4 MMOL/L (ref 22–29)
CREAT SERPL-MCNC: 0.97 MG/DL (ref 0.76–1.27)
DEPRECATED RDW RBC AUTO: 53.5 FL (ref 37–54)
EGFRCR SERPLBLD CKD-EPI 2021: 92.2 ML/MIN/1.73
ERYTHROCYTE [DISTWIDTH] IN BLOOD BY AUTOMATED COUNT: 14.1 % (ref 12.3–15.4)
GLOBULIN UR ELPH-MCNC: 3.2 GM/DL
GLUCOSE SERPL-MCNC: 97 MG/DL (ref 65–99)
HCT VFR BLD AUTO: 49.3 % (ref 37.5–51)
HGB BLD-MCNC: 16.4 G/DL (ref 13–17.7)
MCH RBC QN AUTO: 34 PG (ref 26.6–33)
MCHC RBC AUTO-ENTMCNC: 33.3 G/DL (ref 31.5–35.7)
MCV RBC AUTO: 102.3 FL (ref 79–97)
PLATELET # BLD AUTO: 357 10*3/MM3 (ref 140–450)
PMV BLD AUTO: 11.7 FL (ref 6–12)
POTASSIUM SERPL-SCNC: 3.7 MMOL/L (ref 3.5–5.2)
PROT SERPL-MCNC: 7.4 G/DL (ref 6–8.5)
RBC # BLD AUTO: 4.82 10*6/MM3 (ref 4.14–5.8)
SODIUM SERPL-SCNC: 137 MMOL/L (ref 136–145)
VIT B12 BLD-MCNC: <150 PG/ML (ref 211–946)
WBC NRBC COR # BLD: 11.5 10*3/MM3 (ref 3.4–10.8)

## 2022-05-04 PROCEDURE — 82306 VITAMIN D 25 HYDROXY: CPT | Performed by: NURSE PRACTITIONER

## 2022-05-04 PROCEDURE — 86480 TB TEST CELL IMMUN MEASURE: CPT | Performed by: NURSE PRACTITIONER

## 2022-05-04 PROCEDURE — 80053 COMPREHEN METABOLIC PANEL: CPT | Performed by: NURSE PRACTITIONER

## 2022-05-04 PROCEDURE — 82607 VITAMIN B-12: CPT | Performed by: NURSE PRACTITIONER

## 2022-05-04 PROCEDURE — 99213 OFFICE O/P EST LOW 20 MIN: CPT | Performed by: NURSE PRACTITIONER

## 2022-05-04 PROCEDURE — 85027 COMPLETE CBC AUTOMATED: CPT | Performed by: NURSE PRACTITIONER

## 2022-05-04 PROCEDURE — 36415 COLL VENOUS BLD VENIPUNCTURE: CPT | Performed by: NURSE PRACTITIONER

## 2022-05-04 RX ORDER — OMEPRAZOLE 20 MG/1
20 CAPSULE, DELAYED RELEASE ORAL 2 TIMES DAILY
Qty: 180 CAPSULE | Refills: 1 | Status: SHIPPED | OUTPATIENT
Start: 2022-05-04 | End: 2022-06-02

## 2022-05-04 NOTE — PROGRESS NOTES
Chief Complaint   Patient presents with   • Crohn's Disease       Subjective    Jose Trent is a 56 y.o. male. he is here today for follow-up.    History of Present Illness  56-year-old male with Crohn disease presents for recheck.  Denies any flare of symptoms over the last 6 months.  He underwent colonoscopy 11/30/2021 noted ulceration at the anastomosis characterized by ulceration.  Anastomosis biopsy noted ulceration with acute and chronic inflammation negative for granulomas crypt abscesses or dysplasia.  Terminal ileum, ascending, transverse, descending, sigmoid, rectal biopsy noted no pathologic alterations.  Repeat colonoscopy is recommended in 1 year for surveillance.       The following portions of the patient's history were reviewed and updated as appropriate:   Past Medical History:   Diagnosis Date   • Backache     of indeterminate etiology   • Cobalamin deficiency    • Crohn's disease (HCC)     With ileocolic stricture now resected   • Crohn's disease, small intestine (HCC)    • Drug therapy     long term   • GERD (gastroesophageal reflux disease)    • Partial obstruction of small intestine (HCC)    • Pharyngitis    • Portal hypertension (HCC)    • Portal vein thrombosis     And splenic vein   • Rhinitis    • Tobacco dependence syndrome     encouraged to stop   • Upper respiratory infection      Past Surgical History:   Procedure Laterality Date   • COLECTOMY PARTIAL / TOTAL      Resection of segment of sigmoid colon, resection of 45 cm of ileum, right colon, with ileotransverse colon anastomosis; resection of segment of bladder, suprapubic cystostomy. 03/28/1984       • COLONOSCOPY  07/02/2015   • COLONOSCOPY N/A 2/22/2019    Procedure: COLONOSCOPY;  Surgeon: Dewayne Beavers MD;  Location: [facility] ENDOSCOPY;  Service: Gastroenterology   • COLONOSCOPY N/A 9/23/2020    Procedure: COLONOSCOPY WED;  Surgeon: Dewayne Beavers MD;  Location: [facility] ENDOSCOPY;  Service: Gastroenterology;   Laterality: N/A;   • COLONOSCOPY N/A 11/30/2021    Procedure: COLONOSCOPY;  Surgeon: Dewayne Beavers MD;  Location: [facility] [place] ENDOSCOPY;  Service: Gastroenterology;  Laterality: N/A;   • ENDOSCOPY      Prior resect of cecum & ascend colon found.[place] beyond strict has a normal appear.Stenosis of anastomosis, will not allow pass of scope.Linear ulcerat of anastomosis.Narrow strict & not long strict as reported by xray. Dilatation performed. 6/26/2014       • ENDOSCOPY      Normal hypopharynx, esophagus, dudoenum, symmetrical & patent pylorus. Hiatus hernia in GE junction. Mild non-erosive gastritis in antrum. Biopsy taken. 01/31/2013       • EXPLORATORY LAPAROTOMY      Exploratory laparotomy with adhesiolysis.Takedown of previous anastomosis.Resection of terminal ileum and proximal transverse colon w/a stapled CODY anastomosis between ileum and transverse colon. 03/07/2016       • SPLENECTOMY       Splenomegaly and hypersplenism 11/02/2007    • UPPER GASTROINTESTINAL ENDOSCOPY  01/31/2013     Family History   Problem Relation Age of Onset   • Cancer Other        Prior to Admission medications    Medication Sig Start Date End Date Taking? Authorizing Provider   adalimumab (Humira) 40 MG/0.8ML Prefilled Syringe Kit injection Inject 0.8 mL under the skin into the appropriate area as directed Every 14 (Fourteen) Days. 11/15/21  Yes Torrie Paul APRN   aspirin 81 MG EC tablet Take 81 mg by mouth daily.   Yes Chandrika Bocanegra MD   hydrOXYzine (ATARAX) 10 MG tablet Take 10 mg by mouth At Night As Needed. 4/7/21  Yes Chandrika Bocanegra MD   omeprazole (priLOSEC) 20 MG capsule TAKE 1 CAPSULE TWICE DAILY 12/7/21  Yes Torrie Paul APRN     No Known Allergies  Social History     Socioeconomic History   • Marital status:    Tobacco Use   • Smoking status: Current Every Day Smoker     Packs/day: 1.00     Types: Cigarettes   • Smokeless tobacco: Never Used   Vaping Use   • Vaping Use: Never used   Substance and  "Sexual Activity   • Alcohol use: Never   • Drug use: Never   • Sexual activity: Defer     Comment:        Review of Systems  Review of Systems   Constitutional: Negative for activity change, appetite change, chills, diaphoresis, fatigue, fever and unexpected weight change.   HENT: Negative for sore throat and trouble swallowing.    Respiratory: Negative for shortness of breath.    Gastrointestinal: Positive for diarrhea (about 5-6 times per day ). Negative for abdominal distention, abdominal pain, anal bleeding, blood in stool, constipation, nausea, rectal pain and vomiting.   Musculoskeletal: Negative for arthralgias.   Skin: Negative for pallor.   Neurological: Negative for light-headedness.        /93 (BP Location: Left arm)   Pulse 75   Ht 177.8 cm (70\")   Wt 88.5 kg (195 lb 3.2 oz)   BMI 28.01 kg/m²     Objective    Physical Exam  Constitutional:       General: He is not in acute distress.     Appearance: Normal appearance. He is normal weight. He is not ill-appearing.   HENT:      Head: Normocephalic and atraumatic.   Pulmonary:      Effort: Pulmonary effort is normal.   Abdominal:      General: Abdomen is flat. Bowel sounds are normal. There is no distension.      Palpations: Abdomen is soft. There is no mass.      Tenderness: There is abdominal tenderness.   Neurological:      Mental Status: He is alert.       Admission on 11/30/2021, Discharged on 11/30/2021   Component Date Value Ref Range Status   • Case Report 11/30/2021    Final                    Value:Surgical Pathology Report                         Case: [id number]                                  Authorizing Provider:  Dewayne Beavers MD        Collected:           11/30/2021 10:23 AM          Ordering Location:     [facility]   Received:            11/30/2021 02:35 PM                                 [place] [facility]                                                     Pathologist:           Jacek Villegas, " MD                                                          Specimens:   1) - Large Intestine, anastamosis MM                                                                2) - Small Intestine, Ileum, terminal ileum MM                                                      3) - Large Intestine, Right / Ascending Colon, MM                                                   4) - Large Intestine, Transverse Colon, MM                                                          5) - Large Intestine, Left / Descending Colon, MM                                                                             6) - Large Intestine, Sigmoid Colon, MM                                                             7) - Large Intestine, Rectum, MM                                                          • Final Diagnosis 11/30/2021    Final                    Value:This result contains rich text formatting which cannot be displayed here.     Assessment/Plan      1. Crohn's disease of small intestine without complication (HCC)    2. Gastroesophageal reflux disease with esophagitis without hemorrhage    3. Vitamin B12 deficiency    .   Repeat colonoscopy in 1 year for surveillance.  Complete lab work today continue current medication regimen.    Orders placed during this encounter include:  Orders Placed This Encounter   Procedures   • Vitamin B12     Order Specific Question:   Release to patient     Answer:   Immediate   • Vitamin D 25 Hydroxy     Order Specific Question:   Release to patient     Answer:   Immediate   • CBC (No Diff)     Order Specific Question:   Release to patient     Answer:   Immediate   • Comprehensive Metabolic Panel     Order Specific Question:   Release to patient     Answer:   Immediate   • QuantiFERON TB Gold     Order Specific Question:   Release to patient     Answer:   Immediate       * Surgery not found *    Review and/or summary of lab tests, radiology, procedures, medications. Review and summary of old records  and obtaining of history. The risks and benefits of my recommendations, as well as other treatment options were discussed with the patient today. Questions were answered.    New Medications Ordered This Visit   Medications   • omeprazole (priLOSEC) 20 MG capsule     Sig: Take 1 capsule by mouth 2 (Two) Times a Day.     Dispense:  180 capsule     Refill:  1       Follow-up: Return in about 6 months (around 11/4/2022) for Recheck.          This document has been electronically signed by VEENA Cantu on May 4, 2022 09:22 CDT           I spent 15 minutes caring for Jose on this date of service. This time includes time spent by me in the following activities:preparing for the visit, reviewing tests, obtaining and/or reviewing a separately obtained history, performing a medically appropriate examination and/or evaluation , counseling and educating the patient/family/caregiver, ordering medications, tests, or procedures, referring and communicating with other health care professionals , documenting information in the medical record and independently interpreting results and communicating that information with the patient/family/caregiver    Results for orders placed or performed during the hospital encounter of 11/30/21   Tissue Pathology Exam    Specimen: A: Large Intestine; Tissue    B: Small Intestine, Ileum; Tissue    C: Large Intestine, Right / Ascending Colon; Tissue    D: Large Intestine, Transverse Colon; Tissue    E: Large Intestine, Left / Descending Colon; Tissue    F: Large Intestine, Sigmoid Colon; Tissue    G: Large Intestine, Rectum; Tissue   Result Value Ref Range    Case Report       Surgical Pathology Report                         Case: [id number]                                  Authorizing Provider:  Dewayne Beavers MD        Collected:           11/30/2021 10:23 AM          Ordering Location:     [facility]   Received:            11/30/2021 02:35 PM                                  [place] [facility]                                                     Pathologist:           Jacek Villegas MD                                                          Specimens:   1) - Large Intestine, anastamosis MM                                                                2) - Small Intestine, Ileum, terminal ileum MM                                                      3) - Large Intestine, Right / Ascending Colon, MM                                                   4) - Large Intestine, Transverse Colon, MM                                                          5) - Large Intestine, Left / Descending Colon, MM                                                    6) - Large Intestine, Sigmoid Colon, MM                                                             7) - Large Intestine, Rectum, MM                                                           Final Diagnosis       SEE SCANNED REPORT       Results for orders placed or performed in visit on 11/04/21   CBC (No Diff)    Specimen: Blood   Result Value Ref Range    WBC 10.63 3.40 - 10.80 10*3/mm3    RBC 4.54 4.14 - 5.80 10*6/mm3    Hemoglobin 15.2 13.0 - 17.7 g/dL    Hematocrit 43.4 37.5 - 51.0 %    MCV 95.6 79.0 - 97.0 fL    MCH 33.5 (H) 26.6 - 33.0 pg    MCHC 35.0 31.5 - 35.7 g/dL    RDW 16.2 (H) 12.3 - 15.4 %    RDW-SD 57.2 (H) 37.0 - 54.0 fl    MPV 10.2 6.0 - 12.0 fL    Platelets 389 140 - 450 10*3/mm3   Comprehensive Metabolic Panel    Specimen: Blood   Result Value Ref Range    Glucose 99 65 - 99 mg/dL    BUN 5 (L) 6 - 20 mg/dL    Creatinine 0.93 0.76 - 1.27 mg/dL    Sodium 139 136 - 145 mmol/L    Potassium 3.7 3.5 - 5.2 mmol/L    Chloride 103 98 - 107 mmol/L    CO2 27.0 22.0 - 29.0 mmol/L    Calcium 9.3 8.6 - 10.5 mg/dL    Total Protein 7.5 6.0 - 8.5 g/dL    Albumin 4.70 3.50 - 5.20 g/dL    ALT (SGPT) 19 1 - 41 U/L    AST (SGOT) 20 1 - 40 U/L    Alkaline Phosphatase 103 39 - 117 U/L    Total Bilirubin 0.5 0.0 - 1.2 mg/dL    eGFR  Non African Amer 85 >60 mL/min/1.73    Globulin 2.8 gm/dL    A/G Ratio 1.7 g/dL    BUN/Creatinine Ratio 5.4 (L) 7.0 - 25.0    Anion Gap 9.0 5.0 - 15.0 mmol/L   Results for orders placed or performed in visit on 05/04/21   QuantiFERON TB Plus Client Incubated    Specimen: Blood   Result Value Ref Range    QuantiFERON Criteria Comment     QUANTIFERON TB1 AG VALUE 0.00 IU/mL    QUANTIFERON TB2 AG VALUE 0.00 IU/mL    QuantiFERON Nil Value 0.02 IU/mL    QuantiFERON Mitogen Value >10.00 IU/mL    QUANTIFERON-TB GOLD PLUS Negative Negative   Results for orders placed or performed in visit on 05/04/21   Iron Profile    Specimen: Blood   Result Value Ref Range    Iron 121 59 - 158 mcg/dL    Iron Saturation 26 20 - 50 %    Transferrin 312 200 - 360 mg/dL    TIBC 465 298 - 536 mcg/dL   Vitamin D 25 Hydroxy    Specimen: Blood   Result Value Ref Range    25 Hydroxy, Vitamin D 41.3 ng/ml   CBC (No Diff)    Specimen: Blood   Result Value Ref Range    WBC 9.84 3.40 - 10.80 10*3/mm3    RBC 4.95 4.14 - 5.80 10*6/mm3    Hemoglobin 16.1 13.0 - 17.7 g/dL    Hematocrit 47.5 37.5 - 51.0 %    MCV 96.0 79.0 - 97.0 fL    MCH 32.5 26.6 - 33.0 pg    MCHC 33.9 31.5 - 35.7 g/dL    RDW 14.6 12.3 - 15.4 %    RDW-SD 51.4 37.0 - 54.0 fl    MPV 11.4 6.0 - 12.0 fL    Platelets 338 140 - 450 10*3/mm3   Vitamin B12    Specimen: Blood   Result Value Ref Range    Vitamin B-12 174 (L) 211 - 946 pg/mL   Comprehensive Metabolic Panel    Specimen: Blood   Result Value Ref Range    Glucose 85 65 - 99 mg/dL    BUN 7 6 - 20 mg/dL    Creatinine 0.95 0.76 - 1.27 mg/dL    Sodium 140 136 - 145 mmol/L    Potassium 4.1 3.5 - 5.2 mmol/L    Chloride 103 98 - 107 mmol/L    CO2 28.0 22.0 - 29.0 mmol/L    Calcium 9.3 8.6 - 10.5 mg/dL    Total Protein 8.4 6.0 - 8.5 g/dL    Albumin 4.40 3.50 - 5.20 g/dL    ALT (SGPT) 20 1 - 41 U/L    AST (SGOT) 25 1 - 40 U/L    Alkaline Phosphatase 103 39 - 117 U/L    Total Bilirubin 0.6 0.0 - 1.2 mg/dL    eGFR Non  Amer 83 >60  mL/min/1.73    Globulin 4.0 gm/dL    A/G Ratio 1.1 g/dL    BUN/Creatinine Ratio 7.4 7.0 - 25.0    Anion Gap 9.0 5.0 - 15.0 mmol/L   Results for orders placed or performed during the hospital encounter of 09/23/20   COVID-19, [facility] IN-HOUSE, NP SWAB IN TRANSPORT MEDIA 8-10 HR TAT - Swab, Nasopharynx    Specimen: Nasopharynx; Swab   Result Value Ref Range    COVID19 Not Detected Not Detected - Ref. Range   Tissue Pathology Exam    Specimen: A: Small Intestine, Ileum; Tissue    B: Large Intestine; Tissue   Result Value Ref Range    Case Report       Surgical Pathology Report                         Case: [id number]                                  Authorizing Provider:  Dewayne Beavers MD        Collected:           09/23/2020 10:44 AM          Ordering Location:     [facility]             Received:            09/23/2020 01:16 PM                                 [place] [facility]                                                     Pathologist:           sAim Ronquillo MD                                                       Specimens:   1) - Small Intestine, Ileum, ti bx                                                                  2) - Large Intestine, colonic mucosa bx                                                    Final Diagnosis       SEE SCANNED REPORT       Results for orders placed or performed in visit on 03/05/20   Iron Profile    Specimen: Blood   Result Value Ref Range    Iron 76 59 - 158 mcg/dL    Iron Saturation 24 20 - 50 %    Transferrin 209 200 - 360 mg/dL    TIBC 311 298 - 536 mcg/dL   CBC (No Diff)    Specimen: Blood   Result Value Ref Range    WBC 11.81 (H) 3.40 - 10.80 10*3/mm3    RBC 4.59 4.14 - 5.80 10*6/mm3    Hemoglobin 14.7 13.0 - 17.7 g/dL    Hematocrit 42.3 37.5 - 51.0 %    MCV 92.2 79.0 - 97.0 fL    MCH 32.0 26.6 - 33.0 pg    MCHC 34.8 31.5 - 35.7 g/dL    RDW 13.1 12.3 - 15.4 %    RDW-SD 43.9 37.0 - 54.0 fl    MPV 11.7 6.0 - 12.0 fL    Platelets 341 140 - 450 10*3/mm3    Vitamin B12    Specimen: Blood   Result Value Ref Range    Vitamin B-12 242 211 - 946 pg/mL   Results for orders placed or performed in visit on 03/05/20   QuantiFERON TB Plus Client Incubated    Specimen: Blood   Result Value Ref Range    QuantiFERON Criteria Comment     QUANTIFERON TB1 AG VALUE 0.02 IU/mL    QUANTIFERON TB2 AG VALUE 0.02 IU/mL    QuantiFERON Nil Value 0.02 IU/mL    QuantiFERON Mitogen Value >10.00 IU/mL    QUANTIFERON-TB GOLD PLUS Negative Negative   Results for orders placed or performed in visit on 03/05/19   CBC Auto Differential    Specimen: Blood   Result Value Ref Range    WBC 13.75 (H) 3.40 - 10.80 10*3/mm3    RBC 4.92 4.14 - 5.80 10*6/mm3    Hemoglobin 15.2 13.0 - 17.7 g/dL    Hematocrit 45.0 37.5 - 51.0 %    MCV 91.5 79.0 - 97.0 fL    MCH 30.9 26.6 - 33.0 pg    MCHC 33.8 31.5 - 35.7 g/dL    RDW 14.9 12.3 - 15.4 %    RDW-SD 50.3 37.0 - 54.0 fl    MPV 10.9 6.0 - 12.0 fL    Platelets 416 140 - 450 10*3/mm3    Neutrophil % 54.4 42.7 - 76.0 %    Lymphocyte % 33.6 19.6 - 45.3 %    Monocyte % 6.1 5.0 - 12.0 %    Eosinophil % 4.7 0.3 - 6.2 %    Basophil % 0.8 0.0 - 1.5 %    Immature Grans % 0.4 0.0 - 0.5 %    Neutrophils, Absolute 7.49 (H) 1.40 - 7.00 10*3/mm3    Lymphocytes, Absolute 4.62 (H) 0.70 - 3.10 10*3/mm3    Monocytes, Absolute 0.84 0.10 - 0.90 10*3/mm3    Eosinophils, Absolute 0.64 (H) 0.00 - 0.40 10*3/mm3    Basophils, Absolute 0.11 0.00 - 0.20 10*3/mm3    Immature Grans, Absolute 0.05 0.00 - 0.05 10*3/mm3    nRBC 0.0 0.0 - 0.0 /100 WBC     *Note: Due to a large number of results and/or encounters for the requested time period, some results have not been displayed. A complete set of results can be found in Results Review.

## 2022-05-06 LAB
GAMMA INTERFERON BACKGROUND BLD IA-ACNC: 0.05 IU/ML
M TB IFN-G BLD-IMP: NEGATIVE
M TB IFN-G CD4+ BCKGRND COR BLD-ACNC: 0.04 IU/ML
M TB IFN-G CD4+CD8+ BCKGRND COR BLD-ACNC: 0.05 IU/ML
MITOGEN IGNF BLD-ACNC: >10 IU/ML
SERVICE CMNT-IMP: NORMAL

## 2022-06-02 RX ORDER — OMEPRAZOLE 20 MG/1
CAPSULE, DELAYED RELEASE ORAL
Qty: 180 CAPSULE | Refills: 1 | Status: SHIPPED | OUTPATIENT
Start: 2022-06-02 | End: 2023-02-17

## 2022-11-07 ENCOUNTER — OFFICE VISIT (OUTPATIENT)
Dept: GASTROENTEROLOGY | Facility: CLINIC | Age: 57
End: 2022-11-07

## 2022-11-07 ENCOUNTER — LAB (OUTPATIENT)
Dept: LAB | Facility: HOSPITAL | Age: 57
End: 2022-11-07

## 2022-11-07 VITALS
BODY MASS INDEX: 26.37 KG/M2 | HEIGHT: 70 IN | DIASTOLIC BLOOD PRESSURE: 94 MMHG | HEART RATE: 74 BPM | WEIGHT: 184.2 LBS | SYSTOLIC BLOOD PRESSURE: 142 MMHG

## 2022-11-07 DIAGNOSIS — K50.00 CROHN'S DISEASE OF SMALL INTESTINE WITHOUT COMPLICATION: Primary | ICD-10-CM

## 2022-11-07 DIAGNOSIS — Z11.59 ENCOUNTER FOR SCREENING FOR OTHER VIRAL DISEASES: ICD-10-CM

## 2022-11-07 LAB
ALBUMIN SERPL-MCNC: 4.4 G/DL (ref 3.5–5.2)
ALBUMIN/GLOB SERPL: 1.4 G/DL
ALP SERPL-CCNC: 101 U/L (ref 39–117)
ALT SERPL W P-5'-P-CCNC: 16 U/L (ref 1–41)
ANION GAP SERPL CALCULATED.3IONS-SCNC: 7 MMOL/L (ref 5–15)
AST SERPL-CCNC: 23 U/L (ref 1–40)
BILIRUB SERPL-MCNC: 0.5 MG/DL (ref 0–1.2)
BUN SERPL-MCNC: 8 MG/DL (ref 6–20)
BUN/CREAT SERPL: 8.7 (ref 7–25)
CALCIUM SPEC-SCNC: 9.5 MG/DL (ref 8.6–10.5)
CHLORIDE SERPL-SCNC: 105 MMOL/L (ref 98–107)
CO2 SERPL-SCNC: 26 MMOL/L (ref 22–29)
CREAT SERPL-MCNC: 0.92 MG/DL (ref 0.76–1.27)
DEPRECATED RDW RBC AUTO: 50.9 FL (ref 37–54)
EGFRCR SERPLBLD CKD-EPI 2021: 98.2 ML/MIN/1.73
ERYTHROCYTE [DISTWIDTH] IN BLOOD BY AUTOMATED COUNT: 14 % (ref 12.3–15.4)
GLOBULIN UR ELPH-MCNC: 3.1 GM/DL
GLUCOSE SERPL-MCNC: 90 MG/DL (ref 65–99)
HBV SURFACE AG SERPL QL IA: NORMAL
HCT VFR BLD AUTO: 48.1 % (ref 37.5–51)
HCV AB SER DONR QL: NORMAL
HGB BLD-MCNC: 16.1 G/DL (ref 13–17.7)
MCH RBC QN AUTO: 33 PG (ref 26.6–33)
MCHC RBC AUTO-ENTMCNC: 33.5 G/DL (ref 31.5–35.7)
MCV RBC AUTO: 98.6 FL (ref 79–97)
PLATELET # BLD AUTO: 434 10*3/MM3 (ref 140–450)
PMV BLD AUTO: 11.1 FL (ref 6–12)
POTASSIUM SERPL-SCNC: 3.8 MMOL/L (ref 3.5–5.2)
PROT SERPL-MCNC: 7.5 G/DL (ref 6–8.5)
RBC # BLD AUTO: 4.88 10*6/MM3 (ref 4.14–5.8)
SODIUM SERPL-SCNC: 138 MMOL/L (ref 136–145)
VIT B12 BLD-MCNC: <150 PG/ML (ref 211–946)
WBC NRBC COR # BLD: 11.35 10*3/MM3 (ref 3.4–10.8)

## 2022-11-07 PROCEDURE — 82607 VITAMIN B-12: CPT | Performed by: NURSE PRACTITIONER

## 2022-11-07 PROCEDURE — 85027 COMPLETE CBC AUTOMATED: CPT | Performed by: NURSE PRACTITIONER

## 2022-11-07 PROCEDURE — 36415 COLL VENOUS BLD VENIPUNCTURE: CPT | Performed by: NURSE PRACTITIONER

## 2022-11-07 PROCEDURE — 80053 COMPREHEN METABOLIC PANEL: CPT | Performed by: NURSE PRACTITIONER

## 2022-11-07 PROCEDURE — 86803 HEPATITIS C AB TEST: CPT | Performed by: NURSE PRACTITIONER

## 2022-11-07 PROCEDURE — 99213 OFFICE O/P EST LOW 20 MIN: CPT | Performed by: NURSE PRACTITIONER

## 2022-11-07 PROCEDURE — 87340 HEPATITIS B SURFACE AG IA: CPT | Performed by: NURSE PRACTITIONER

## 2022-11-07 RX ORDER — DEXTROSE AND SODIUM CHLORIDE 5; .45 G/100ML; G/100ML
30 INJECTION, SOLUTION INTRAVENOUS CONTINUOUS PRN
Status: CANCELLED | OUTPATIENT
Start: 2022-11-28

## 2022-11-07 NOTE — PROGRESS NOTES
Chief Complaint   Patient presents with   • Crohn's Disease       Subjective    Jose Trent is a 56 y.o. male. he is here today for follow-up.                                                                  Assessment & Plan                                     1. Crohn's disease of small intestine without complication (HCC)    2. Encounter for screening for other viral diseases      Plan; schedule patient for colonoscopy for surveillance.  Continue Humira every other week recheck lab work today referral placed to dermatology for annual surveillance.  Follow-up after test return office sooner if needed    Follow-up: Return in about 4 weeks (around 12/5/2022) for Recheck, After test.     HPI  56-year-old male presents to discuss Crohn's disease.  Reports bowel movements are still loose about 5-6 times per day denies any melena or hematochezia.  Weight is stable reports a good appetite.  Reports he has not seen dermatologist in some time is open to referral.  States he gets his flu shot annually but is not taking any COVID vaccines.  Previous colonoscopy completed November 2021 noted ulceration at the anastomosis all other biopsies were normal.  He has been well maintained on Humira for several years.  Underwent resection of terminal ileum and proximal transverse colon with a CODY anastomosis between ileum and transverse colon 3/7/2016.      Review of Systems  Review of Systems   Constitutional: Positive for fatigue. Negative for activity change, appetite change, chills, diaphoresis, fever and unexpected weight change.   HENT: Negative for sore throat and trouble swallowing.    Respiratory: Negative for shortness of breath.    Gastrointestinal: Positive for diarrhea. Negative for abdominal distention, abdominal pain, anal bleeding, blood in stool, constipation, nausea, rectal pain and  "vomiting.   Musculoskeletal: Negative for arthralgias. Neck pain: 5-6 times per day    Skin: Negative for pallor.   Neurological: Negative for light-headedness.       /94 (BP Location: Left arm)   Pulse 74   Ht 177.8 cm (70\")   Wt 83.6 kg (184 lb 3.2 oz)   BMI 26.43 kg/m²     Objective      Physical Exam          The following portions of the patient's history were reviewed and updated as appropriate:   Past Medical History:   Diagnosis Date   • Backache     of indeterminate etiology   • Cobalamin deficiency    • Crohn's disease (HCC)     With ileocolic stricture now resected   • Crohn's disease, small intestine (HCC)    • Drug therapy     long term   • GERD (gastroesophageal reflux disease)    • Partial obstruction of small intestine (HCC)    • Pharyngitis    • Portal hypertension (HCC)    • Portal vein thrombosis     And splenic vein   • Rhinitis    • Tobacco dependence syndrome     encouraged to stop   • Upper respiratory infection      Past Surgical History:   Procedure Laterality Date   • COLECTOMY PARTIAL / TOTAL      Resection of segment of sigmoid colon, resection of 45 cm of ileum, right colon, with ileotransverse colon anastomosis; resection of segment of bladder, suprapubic cystostomy. 03/28/1984       • COLONOSCOPY  07/02/2015   • COLONOSCOPY N/A 2/22/2019    Procedure: COLONOSCOPY;  Surgeon: Dewayne Beavers MD;  Location: [facility] ENDOSCOPY;  Service: Gastroenterology   • COLONOSCOPY N/A 9/23/2020    Procedure: COLONOSCOPY WED;  Surgeon: Dewayne Beavers MD;  Location: [facility] ENDOSCOPY;  Service: Gastroenterology;  Laterality: N/A;   • COLONOSCOPY N/A 11/30/2021    Procedure: COLONOSCOPY;  Surgeon: Dewayne Beavers MD;  Location: [facility] ENDOSCOPY;  Service: Gastroenterology;  Laterality: N/A;   • ENDOSCOPY      Prior resect of cecum & ascend colon found.[place] beyond strict has a normal appear.Stenosis of anastomosis, will not allow pass of scope.Linear ulcerat of anastomosis.Narrow strict & " not long strict as reported by xray. Dilatation performed. 6/26/2014       • ENDOSCOPY      Normal hypopharynx, esophagus, dudoenum, symmetrical & patent pylorus. Hiatus hernia in GE junction. Mild non-erosive gastritis in antrum. Biopsy taken. 01/31/2013       • EXPLORATORY LAPAROTOMY      Exploratory laparotomy with adhesiolysis.Takedown of previous anastomosis.Resection of terminal ileum and proximal transverse colon w/a stapled CODY anastomosis between ileum and transverse colon. 03/07/2016       • SPLENECTOMY       Splenomegaly and hypersplenism 11/02/2007    • UPPER GASTROINTESTINAL ENDOSCOPY  01/31/2013     Family History   Problem Relation Age of Onset   • Cancer Other        No Known Allergies  Social History     Socioeconomic History   • Marital status:    Tobacco Use   • Smoking status: Every Day     Packs/day: 1.00     Types: Cigarettes   • Smokeless tobacco: Never   Vaping Use   • Vaping Use: Never used   Substance and Sexual Activity   • Alcohol use: Never   • Drug use: Never   • Sexual activity: Defer     Comment:      Current Medications:  Prior to Admission medications    Medication Sig Start Date End Date Taking? Authorizing Provider   adalimumab (Humira) 40 MG/0.8ML Prefilled Syringe Kit injection Inject 0.8 mL under the skin into the appropriate area as directed Every 14 (Fourteen) Days. 11/15/21  Yes Torrie Paul APRN   aspirin 81 MG EC tablet Take 81 mg by mouth daily.   Yes ProviderChandrika MD   hydrOXYzine (ATARAX) 10 MG tablet Take 10 mg by mouth At Night As Needed. 4/7/21  Yes Chandrika Bocanegra MD   omeprazole (priLOSEC) 20 MG capsule TAKE 1 CAPSULE TWICE DAILY 6/2/22  Yes Torrie Paul APRN     Orders placed during this encounter include:  Orders Placed This Encounter   Procedures   • Vitamin B12     Order Specific Question:   Release to patient     Answer:   Routine Release   • Comprehensive Metabolic Panel     Order Specific Question:   Release to patient      Answer:   Routine Release   • CBC (No Diff)     Order Specific Question:   Release to patient     Answer:   Routine Release   • Hepatitis C Antibody     Order Specific Question:   Release to patient     Answer:   Routine Release   • Hepatitis B Surface Antigen     Order Specific Question:   Release to patient     Answer:   Routine Release   • Ambulatory Referral to Dermatology     Referral Priority:   Routine     Referral Type:   Consultation     Referral Reason:   Specialty Services Required     Requested Specialty:   Dermatology     Number of Visits Requested:   1   • Obtain Informed Consent     Standing Status:   Future     Order Specific Question:   Informed Consent Given For     Answer:   COLONOSCOPY     COLONOSCOPY (N/A)  New Medications Ordered This Visit   Medications   • polyethylene glycol (GoLYTELY) 236 g solution     Sig: Starting at noon on day prior to procedure, drink 8 ounces every 30 minutes until all gone or stools are clear. May add flavor packet.     Dispense:  4000 mL     Refill:  0         Review and/or summary of lab tests, radiology, procedures, medications. Review and summary of old records and obtaining of history. The risks and benefits of my recommendations, as well as other treatment options were discussed . Any questions/concerned were answered. Patient voiced understanding and agreement.          This document has been electronically signed by VEENA Cantu on November 7, 2022 10:38 CST                                               Results for orders placed or performed in visit on 05/04/22   QuantiFERON TB Plus Client Incubated    Specimen: Blood   Result Value Ref Range    QuantiFERON Criteria Comment     QUANTIFERON TB1 AG VALUE 0.04 IU/mL    QUANTIFERON TB2 AG VALUE 0.05 IU/mL    QuantiFERON Nil Value 0.05 IU/mL    QuantiFERON Mitogen Value >10.00 IU/mL    QUANTIFERON-TB GOLD PLUS Negative Negative   Vitamin D 25 Hydroxy    Specimen: Blood   Result Value Ref Range    25  Hydroxy, Vitamin D 34.9 30.0 - 100.0 ng/ml   CBC (No Diff)    Specimen: Blood   Result Value Ref Range    WBC 11.50 (H) 3.40 - 10.80 10*3/mm3    RBC 4.82 4.14 - 5.80 10*6/mm3    Hemoglobin 16.4 13.0 - 17.7 g/dL    Hematocrit 49.3 37.5 - 51.0 %    .3 (H) 79.0 - 97.0 fL    MCH 34.0 (H) 26.6 - 33.0 pg    MCHC 33.3 31.5 - 35.7 g/dL    RDW 14.1 12.3 - 15.4 %    RDW-SD 53.5 37.0 - 54.0 fl    MPV 11.7 6.0 - 12.0 fL    Platelets 357 140 - 450 10*3/mm3   Vitamin B12    Specimen: Blood   Result Value Ref Range    Vitamin B-12 <150 (L) 211 - 946 pg/mL   Comprehensive Metabolic Panel    Specimen: Blood   Result Value Ref Range    Glucose 97 65 - 99 mg/dL    BUN 9 6 - 20 mg/dL    Creatinine 0.97 0.76 - 1.27 mg/dL    Sodium 137 136 - 145 mmol/L    Potassium 3.7 3.5 - 5.2 mmol/L    Chloride 103 98 - 107 mmol/L    CO2 23.4 22.0 - 29.0 mmol/L    Calcium 9.2 8.6 - 10.5 mg/dL    Total Protein 7.4 6.0 - 8.5 g/dL    Albumin 4.20 3.50 - 5.20 g/dL    ALT (SGPT) 27 1 - 41 U/L    AST (SGOT) 28 1 - 40 U/L    Alkaline Phosphatase 84 39 - 117 U/L    Total Bilirubin 0.7 0.0 - 1.2 mg/dL    Globulin 3.2 gm/dL    A/G Ratio 1.3 g/dL    BUN/Creatinine Ratio 9.3 7.0 - 25.0    Anion Gap 10.6 5.0 - 15.0 mmol/L    eGFR 92.2 >60.0 mL/min/1.73   Results for orders placed or performed during the hospital encounter of 11/30/21   Tissue Pathology Exam    Specimen: A: Large Intestine; Tissue    B: Small Intestine, Ileum; Tissue    C: Large Intestine, Right / Ascending Colon; Tissue    D: Large Intestine, Transverse Colon; Tissue    E: Large Intestine, Left / Descending Colon; Tissue    F: Large Intestine, Sigmoid Colon; Tissue    G: Large Intestine, Rectum; Tissue   Result Value Ref Range    Case Report       Surgical Pathology Report                         Case: [id number]                                  Authorizing Provider:  Dewayne Beavers MD        Collected:           11/30/2021 10:23 AM          Ordering Location:     [facility]    Received:            11/30/2021 02:35 PM                                 [facility]                                                     Pathologist:           Jacek Villegas MD                                                          Specimens:   1) - Large Intestine, anastamosis MM                                                                2) - Small Intestine, Ileum, terminal ileum MM                                                      3) - Large Intestine, Right / Ascending Colon, MM                                                   4) - Large Intestine, Transverse Colon, MM                                                          5) - Large Intestine, Left / Descending Colon, MM                                                    6) - Large Intestine, Sigmoid Colon, MM                                                             7) - Large Intestine, Rectum, MM                                                           Final Diagnosis       SEE SCANNED REPORT       Results for orders placed or performed in visit on 11/04/21   CBC (No Diff)    Specimen: Blood   Result Value Ref Range    WBC 10.63 3.40 - 10.80 10*3/mm3    RBC 4.54 4.14 - 5.80 10*6/mm3    Hemoglobin 15.2 13.0 - 17.7 g/dL    Hematocrit 43.4 37.5 - 51.0 %    MCV 95.6 79.0 - 97.0 fL    MCH 33.5 (H) 26.6 - 33.0 pg    MCHC 35.0 31.5 - 35.7 g/dL    RDW 16.2 (H) 12.3 - 15.4 %    RDW-SD 57.2 (H) 37.0 - 54.0 fl    MPV 10.2 6.0 - 12.0 fL    Platelets 389 140 - 450 10*3/mm3   Comprehensive Metabolic Panel    Specimen: Blood   Result Value Ref Range    Glucose 99 65 - 99 mg/dL    BUN 5 (L) 6 - 20 mg/dL    Creatinine 0.93 0.76 - 1.27 mg/dL    Sodium 139 136 - 145 mmol/L    Potassium 3.7 3.5 - 5.2 mmol/L    Chloride 103 98 - 107 mmol/L    CO2 27.0 22.0 - 29.0 mmol/L    Calcium 9.3 8.6 - 10.5 mg/dL    Total Protein 7.5 6.0 - 8.5 g/dL    Albumin 4.70 3.50 - 5.20 g/dL    ALT (SGPT) 19 1 - 41 U/L    AST (SGOT) 20 1 - 40 U/L    Alkaline  Phosphatase 103 39 - 117 U/L    Total Bilirubin 0.5 0.0 - 1.2 mg/dL    eGFR Non African Amer 85 >60 mL/min/1.73    Globulin 2.8 gm/dL    A/G Ratio 1.7 g/dL    BUN/Creatinine Ratio 5.4 (L) 7.0 - 25.0    Anion Gap 9.0 5.0 - 15.0 mmol/L   Results for orders placed or performed in visit on 05/04/21   QuantiFERON TB Plus Client Incubated    Specimen: Blood   Result Value Ref Range    QuantiFERON Criteria Comment     QUANTIFERON TB1 AG VALUE 0.00 IU/mL    QUANTIFERON TB2 AG VALUE 0.00 IU/mL    QuantiFERON Nil Value 0.02 IU/mL    QuantiFERON Mitogen Value >10.00 IU/mL    QUANTIFERON-TB GOLD PLUS Negative Negative   Results for orders placed or performed in visit on 05/04/21   Iron Profile    Specimen: Blood   Result Value Ref Range    Iron 121 59 - 158 mcg/dL    Iron Saturation 26 20 - 50 %    Transferrin 312 200 - 360 mg/dL    TIBC 465 298 - 536 mcg/dL   Vitamin D 25 Hydroxy    Specimen: Blood   Result Value Ref Range    25 Hydroxy, Vitamin D 41.3 ng/ml   CBC (No Diff)    Specimen: Blood   Result Value Ref Range    WBC 9.84 3.40 - 10.80 10*3/mm3    RBC 4.95 4.14 - 5.80 10*6/mm3    Hemoglobin 16.1 13.0 - 17.7 g/dL    Hematocrit 47.5 37.5 - 51.0 %    MCV 96.0 79.0 - 97.0 fL    MCH 32.5 26.6 - 33.0 pg    MCHC 33.9 31.5 - 35.7 g/dL    RDW 14.6 12.3 - 15.4 %    RDW-SD 51.4 37.0 - 54.0 fl    MPV 11.4 6.0 - 12.0 fL    Platelets 338 140 - 450 10*3/mm3   Vitamin B12    Specimen: Blood   Result Value Ref Range    Vitamin B-12 174 (L) 211 - 946 pg/mL   Comprehensive Metabolic Panel    Specimen: Blood   Result Value Ref Range    Glucose 85 65 - 99 mg/dL    BUN 7 6 - 20 mg/dL    Creatinine 0.95 0.76 - 1.27 mg/dL    Sodium 140 136 - 145 mmol/L    Potassium 4.1 3.5 - 5.2 mmol/L    Chloride 103 98 - 107 mmol/L    CO2 28.0 22.0 - 29.0 mmol/L    Calcium 9.3 8.6 - 10.5 mg/dL    Total Protein 8.4 6.0 - 8.5 g/dL    Albumin 4.40 3.50 - 5.20 g/dL    ALT (SGPT) 20 1 - 41 U/L    AST (SGOT) 25 1 - 40 U/L    Alkaline Phosphatase 103 39 - 117 U/L     Total Bilirubin 0.6 0.0 - 1.2 mg/dL    eGFR Non African Amer 83 >60 mL/min/1.73    Globulin 4.0 gm/dL    A/G Ratio 1.1 g/dL    BUN/Creatinine Ratio 7.4 7.0 - 25.0    Anion Gap 9.0 5.0 - 15.0 mmol/L     *Note: Due to a large number of results and/or encounters for the requested time period, some results have not been displayed. A complete set of results can be found in Results Review.

## 2022-11-11 RX ORDER — ADALIMUMAB 40MG/0.8ML
40 KIT SUBCUTANEOUS
Qty: 2 EACH | Refills: 11 | Status: SHIPPED | OUTPATIENT
Start: 2022-11-11

## 2022-11-28 ENCOUNTER — ANESTHESIA (OUTPATIENT)
Dept: GASTROENTEROLOGY | Facility: HOSPITAL | Age: 57
End: 2022-11-28

## 2022-11-28 ENCOUNTER — ANESTHESIA EVENT (OUTPATIENT)
Dept: GASTROENTEROLOGY | Facility: HOSPITAL | Age: 57
End: 2022-11-28

## 2022-11-28 ENCOUNTER — HOSPITAL ENCOUNTER (OUTPATIENT)
Facility: HOSPITAL | Age: 57
Setting detail: HOSPITAL OUTPATIENT SURGERY
Discharge: HOME | End: 2022-11-28
Attending: INTERNAL MEDICINE | Admitting: INTERNAL MEDICINE

## 2022-11-28 VITALS
SYSTOLIC BLOOD PRESSURE: 124 MMHG | RESPIRATION RATE: 18 BRPM | OXYGEN SATURATION: 95 % | DIASTOLIC BLOOD PRESSURE: 80 MMHG | BODY MASS INDEX: 24.84 KG/M2 | HEART RATE: 63 BPM | TEMPERATURE: 97.7 F | HEIGHT: 72 IN | WEIGHT: 183.4 LBS

## 2022-11-28 DIAGNOSIS — K50.00 CROHN'S DISEASE OF SMALL INTESTINE WITHOUT COMPLICATION: ICD-10-CM

## 2022-11-28 PROCEDURE — 88305 TISSUE EXAM BY PATHOLOGIST: CPT

## 2022-11-28 PROCEDURE — 25010000002 PROPOFOL 10 MG/ML EMULSION: Performed by: NURSE ANESTHETIST, CERTIFIED REGISTERED

## 2022-11-28 PROCEDURE — 45380 COLONOSCOPY AND BIOPSY: CPT | Performed by: INTERNAL MEDICINE

## 2022-11-28 RX ORDER — PROPOFOL 10 MG/ML
VIAL (ML) INTRAVENOUS AS NEEDED
Status: DISCONTINUED | OUTPATIENT
Start: 2022-11-28 | End: 2022-11-28 | Stop reason: SURG

## 2022-11-28 RX ORDER — PROMETHAZINE HYDROCHLORIDE 25 MG/1
25 SUPPOSITORY RECTAL ONCE AS NEEDED
Status: DISCONTINUED | OUTPATIENT
Start: 2022-11-28 | End: 2022-11-28 | Stop reason: HOSPADM

## 2022-11-28 RX ORDER — ONDANSETRON 2 MG/ML
4 INJECTION INTRAMUSCULAR; INTRAVENOUS ONCE AS NEEDED
Status: DISCONTINUED | OUTPATIENT
Start: 2022-11-28 | End: 2022-11-28 | Stop reason: HOSPADM

## 2022-11-28 RX ORDER — PROMETHAZINE HYDROCHLORIDE 25 MG/1
25 TABLET ORAL ONCE AS NEEDED
Status: DISCONTINUED | OUTPATIENT
Start: 2022-11-28 | End: 2022-11-28 | Stop reason: HOSPADM

## 2022-11-28 RX ORDER — DEXTROSE AND SODIUM CHLORIDE 5; .45 G/100ML; G/100ML
30 INJECTION, SOLUTION INTRAVENOUS CONTINUOUS PRN
Status: DISCONTINUED | OUTPATIENT
Start: 2022-11-28 | End: 2022-11-28 | Stop reason: HOSPADM

## 2022-11-28 RX ORDER — MEPERIDINE HYDROCHLORIDE 25 MG/ML
12.5 INJECTION INTRAMUSCULAR; INTRAVENOUS; SUBCUTANEOUS
Status: DISCONTINUED | OUTPATIENT
Start: 2022-11-28 | End: 2022-11-28 | Stop reason: HOSPADM

## 2022-11-28 RX ADMIN — DEXTROSE AND SODIUM CHLORIDE 30 ML/HR: 5; 450 INJECTION, SOLUTION INTRAVENOUS at 15:35

## 2022-11-28 RX ADMIN — PROPOFOL 100 MG: 10 INJECTION, EMULSION INTRAVENOUS at 16:13

## 2022-11-28 NOTE — ANESTHESIA POSTPROCEDURE EVALUATION
Patient: Jose Trent    Procedure Summary     Date: 11/28/22 Room / Location: [facility] ENDOSCOPY 1 / [facility]OSCOPY    Anesthesia Start: 1609 Anesthesia Stop: 1622    Procedure: COLONOSCOPY Diagnosis:       Crohn's disease of small intestine without complication (HCC)      (Crohn's disease of small intestine without complication (HCC) [K50.00])    Surgeons: Dewayne Beavers MD Provider: Antwon Ocampo CRNA    Anesthesia Type: general ASA Status: 2          Anesthesia Type: general    Vitals  No vitals data found for the desired time range.          Post Anesthesia Care and Evaluation    Patient location during evaluation: bedside  Patient participation: waiting for patient participation  Level of consciousness: sleepy but conscious  Pain score: 0  Pain management: adequate    Airway patency: patent  Anesthetic complications: No anesthetic complications  PONV Status: none  Cardiovascular status: acceptable  Respiratory status: acceptable  Hydration status: acceptable    Comments: 63  128/73  18  97%

## 2022-11-28 NOTE — ANESTHESIA PREPROCEDURE EVALUATION
Anesthesia Evaluation     NPO Solid Status: > 8 hours  NPO Liquid Status: > 2 hours           Airway   Mallampati: II  TM distance: >3 FB  Possible difficult intubation  Dental - normal exam     Pulmonary - normal exam   (+) a smoker Current,   Cardiovascular - negative cardio ROS and normal exam        Neuro/Psych- negative ROS  GI/Hepatic/Renal/Endo    (+)  GERD,      ROS Comment: crohns  Portal HTN    Musculoskeletal     Abdominal  - normal exam   Substance History      OB/GYN          Other                      Anesthesia Plan    ASA 2     general   total IV anesthesia  intravenous induction     Anesthetic plan, risks, benefits, and alternatives have been provided, discussed and informed consent has been obtained with: patient.        CODE STATUS:        No

## 2022-11-30 LAB — REF LAB TEST METHOD: NORMAL

## 2022-12-06 ENCOUNTER — OFFICE VISIT (OUTPATIENT)
Dept: GASTROENTEROLOGY | Facility: CLINIC | Age: 57
End: 2022-12-06

## 2022-12-06 VITALS
OXYGEN SATURATION: 98 % | HEIGHT: 72 IN | SYSTOLIC BLOOD PRESSURE: 145 MMHG | BODY MASS INDEX: 25.19 KG/M2 | TEMPERATURE: 97.7 F | DIASTOLIC BLOOD PRESSURE: 90 MMHG | WEIGHT: 186 LBS | HEART RATE: 78 BPM

## 2022-12-06 DIAGNOSIS — K50.00 CROHN'S DISEASE OF SMALL INTESTINE WITHOUT COMPLICATION: ICD-10-CM

## 2022-12-06 DIAGNOSIS — K21.00 GASTROESOPHAGEAL REFLUX DISEASE WITH ESOPHAGITIS WITHOUT HEMORRHAGE: Primary | ICD-10-CM

## 2022-12-06 PROCEDURE — 99213 OFFICE O/P EST LOW 20 MIN: CPT | Performed by: NURSE PRACTITIONER

## 2022-12-06 RX ORDER — EPINEPHRINE 0.3 MG/.3ML
INJECTION SUBCUTANEOUS
COMMUNITY
Start: 2022-11-30

## 2022-12-06 NOTE — PROGRESS NOTES
Chief Complaint   Patient presents with   • Crohn's Disease       Subjective    Jose Trent is a 57 y.o. male. he is here today for follow-up.                                                                  Assessment & Plan                                     1. Gastroesophageal reflux disease with esophagitis without hemorrhage    2. Crohn's disease of small intestine without complication (HCC)      Continue PPI daily avoid gastric irritants follow standard antireflux measures.  Continue Humira every 14 days.  Follow-up in 6 months with lab work for surveillance we will check on dermatology referral for annual checkup.  Return to GI office sooner if needed    Follow-up: Return in about 6 months (around 6/6/2023) for Recheck.     HPI  57-year-old male with Crohn disease presents for follow-up after colonoscopy.  Denies any change in abdominal pain blood in stool or change in bowel habits.  Bowel movements are typically 5-6 times per day described as watery to loose with occasional formed.  He has been well maintained with Humira for several years he underwent resection of terminal ileum and proximal transverse colon 3/7/2016  Colonoscopy completed 11/28/2022 noted normal perianal digital rectal exam localized area of erythematous mucosa at the anastomosis several biopsies were obtained anastomosis was patent.  Colon and TI biopsy noted no significant histopathologic abnormality negative for dysplasia or carcinoma or microscopic colitis.  Repeat recommended in 1 year for surveillance    Review of Systems  Review of Systems   Constitutional: Negative for activity change, appetite change, chills, diaphoresis, fatigue, fever and unexpected weight change.   Respiratory: Negative for cough and shortness of breath.    Gastrointestinal: Positive for diarrhea (5-6  per day occasionally formed ).  "Negative for abdominal distention, abdominal pain, anal bleeding, blood in stool, constipation, nausea, rectal pain and vomiting.       /90 (BP Location: Left arm, Patient Position: Sitting, Cuff Size: Adult)   Pulse 78   Temp 97.7 °F (36.5 °C) (Temporal)   Ht 182.9 cm (72.01\")   Wt 84.4 kg (186 lb)   SpO2 98%   BMI 25.22 kg/m²     Objective      Physical Exam  Constitutional:       General: He is not in acute distress.     Appearance: Normal appearance. He is normal weight. He is not ill-appearing.   HENT:      Head: Normocephalic and atraumatic.   Pulmonary:      Effort: Pulmonary effort is normal.   Abdominal:      General: Abdomen is flat. Bowel sounds are normal. There is no distension.      Palpations: Abdomen is soft. There is no mass.      Tenderness: There is abdominal tenderness in the right lower quadrant.   Neurological:      Mental Status: He is alert.               The following portions of the patient's history were reviewed and updated as appropriate:   Past Medical History:   Diagnosis Date   • Backache     of indeterminate etiology   • Cobalamin deficiency    • Crohn's disease (HCC)     With ileocolic stricture now resected   • Crohn's disease, small intestine (HCC)    • Drug therapy     long term   • GERD (gastroesophageal reflux disease)    • Partial obstruction of small intestine (HCC)    • Pharyngitis    • Portal hypertension (HCC)    • Portal vein thrombosis     And splenic vein   • Rhinitis    • Tobacco dependence syndrome     encouraged to stop   • Upper respiratory infection      Past Surgical History:   Procedure Laterality Date   • COLECTOMY PARTIAL / TOTAL      Resection of segment of sigmoid colon, resection of 45 cm of ileum, right colon, with ileotransverse colon anastomosis; resection of segment of bladder, suprapubic cystostomy. 03/28/1984       • COLONOSCOPY  07/02/2015   • COLONOSCOPY N/A 2/22/2019    Procedure: COLONOSCOPY;  Surgeon: Dewayne Beavers MD;  Location: [facility]" [facility] ENDOSCOPY;  Service: Gastroenterology   • COLONOSCOPY N/A 9/23/2020    Procedure: COLONOSCOPY WED;  Surgeon: Dewayne Beavers MD;  Location: [facility] ENDOSCOPY;  Service: Gastroenterology;  Laterality: N/A;   • COLONOSCOPY N/A 11/30/2021    Procedure: COLONOSCOPY;  Surgeon: Dewayne Beavers MD;  Location: [facility] ENDOSCOPY;  Service: Gastroenterology;  Laterality: N/A;   • ENDOSCOPY      Prior resect of cecum & ascend colon found.[place]uoosa beyond strict has a normal appear.Stenosis of anastomosis, will not allow pass of scope.Linear ulcerat of anastomosis.Narrow strict & not long strict as reported by xray. Dilatation performed. 6/26/2014       • ENDOSCOPY      Normal hypopharynx, esophagus, dudoenum, symmetrical & patent pylorus. Hiatus hernia in GE junction. Mild non-erosive gastritis in antrum. Biopsy taken. 01/31/2013       • EXPLORATORY LAPAROTOMY      Exploratory laparotomy with adhesiolysis.Takedown of previous anastomosis.Resection of terminal ileum and proximal transverse colon w/a stapled CODY anastomosis between ileum and transverse colon. 03/07/2016       • SPLENECTOMY       Splenomegaly and hypersplenism 11/02/2007    • UPPER GASTROINTESTINAL ENDOSCOPY  01/31/2013     Family History   Problem Relation Age of Onset   • Cancer Other        No Known Allergies  Social History     Socioeconomic History   • Marital status:    Tobacco Use   • Smoking status: Every Day     Packs/day: 1.00     Types: Cigarettes   • Smokeless tobacco: Never   Vaping Use   • Vaping Use: Never used   Substance and Sexual Activity   • Alcohol use: Never   • Drug use: Never   • Sexual activity: Defer     Comment:      Current Medications:  Prior to Admission medications    Medication Sig Start Date End Date Taking? Authorizing Provider   adalimumab (Humira) 40 MG/0.8ML Prefilled Syringe Kit injection Inject 0.8 mL under the skin into the appropriate area as directed Every 14 (Fourteen) Days. 11/11/22  Yes Torrie Paul  VEENA   aspirin 81 MG EC tablet Take 81 mg by mouth daily.   Yes Chandrika Bocanegra MD   hydrOXYzine (ATARAX) 10 MG tablet Take 10 mg by mouth At Night As Needed. 21  Yes Chandrika Bocanegra MD   omeprazole (priLOSEC) 20 MG capsule TAKE 1 CAPSULE TWICE DAILY 22  Yes Torrie Paul APRN   EPINEPHrine (EPIPEN) 0.3 MG/0.3ML solution auto-injector injection  22   ProviderChandrika MD     Orders placed during this encounter include:  No orders of the defined types were placed in this encounter.    * Surgery not found *  No orders of the defined types were placed in this encounter.        Review and/or summary of lab tests, radiology, procedures, medications. Review and summary of old records and obtaining of history. The risks and benefits of my recommendations, as well as other treatment options were discussed . Any questions/concerned were answered. Patient voiced understanding and agreement.          This document has been electronically signed by VEENA Cantu on 2022 10:49 CST                                               Results for orders placed or performed during the hospital encounter of 22   TISSUE EXAM, [facility] ([facility])    Specimen: A: Large Intestine; Tissue    B: Small Intestine, Ileum; Tissue   Result Value Ref Range    Reference Lab Report       [facility]  [address] [place] [address]  Phone: [phone] or [phone]  Fax: [phone]  Jacek Villegas M.D., Medical Director    PATIENT NAME                                     LABORATORY NO.  [id number]   JOSEMANUEL MCALLISTER.                    [id number]  [id number]                                 AGE                    SEX   SSN              CLIENT REF #  [facility]                   57        1965      M     [id number]      [id number]    [place]                               REQUESTING M.D.           ATTENDING M.D.         COPY  "TO.  [address] [place] [address]                     DATE COLLECTED            DATE RECEIVED          DATE REPORTED  11/28/2022 11/29/2022 11/30/2022    DIAGNOSIS:  A.     COLON, BIOPSY:  Colonic mucosa with no significant histopathologic abnormality  No evidence of dysplasia, carcinoma, or  microscopic colitis    B.     TERMINAL ILEUM BIOPSY:  Ileal mucosa with no significant histopathologic abnormality  Negative for dysplasia or carcinoma    CLINICAL HISTORY:  Crohn's disease of small intestine without complication    SPECIMENS RECEIVED:  A.    COLON, BIOPSY  B.    TERMINAL ILEUM BIOPSY    MICROSCOPIC DESCRIPTION:  Tissue blocks are prepared and slides are examined microscopically on all  specimens. See diagnosis for details.    Professional interpretation rendered by Asim Ronquillo M.D.,TRENA at [facility] [place], [address] [place] [address].    GROSS DESCRIPTION:  A.    Specimen is received in 1 formalin filled container labeled \"colon mucosa  cold biopsy\" and consists of 3 portions of tan soft tissue measuring 0.5 x  0.5 x 0.1 cm.  The specimen is submitted entirely in 1 cassette.  BW  B.    Specimen is received in 1 formalin filled container labeled \"terminal ileum  cold biopsy\" and consists of 3 portions of tan soft tissue measuring 0.5 x  0.4 x 0.2 cm.   The smallest portions may not survive processing and the  specimen is submitted entirely in 1 cassette.    REVIEWED, DIAGNOSED AND ELECTRONICALLY  SIGNED BY:    Asim Ronquillo M.D.,FRebeccaC.A.P.  CPT CODES:  88305x2     Results for orders placed or performed in visit on 11/07/22   Hepatitis C Antibody    Specimen: Blood   Result Value Ref Range    Hepatitis C Ab Non-Reactive Non-Reactive   Hepatitis B Surface Antigen    Specimen: Blood   Result Value Ref Range    Hepatitis B Surface Ag Non-Reactive Non-Reactive   CBC (No Diff)    Specimen: Blood   Result Value Ref Range    " WBC 11.35 (H) 3.40 - 10.80 10*3/mm3    RBC 4.88 4.14 - 5.80 10*6/mm3    Hemoglobin 16.1 13.0 - 17.7 g/dL    Hematocrit 48.1 37.5 - 51.0 %    MCV 98.6 (H) 79.0 - 97.0 fL    MCH 33.0 26.6 - 33.0 pg    MCHC 33.5 31.5 - 35.7 g/dL    RDW 14.0 12.3 - 15.4 %    RDW-SD 50.9 37.0 - 54.0 fl    MPV 11.1 6.0 - 12.0 fL    Platelets 434 140 - 450 10*3/mm3   Vitamin B12    Specimen: Blood   Result Value Ref Range    Vitamin B-12 <150 (L) 211 - 946 pg/mL   Comprehensive Metabolic Panel    Specimen: Blood   Result Value Ref Range    Glucose 90 65 - 99 mg/dL    BUN 8 6 - 20 mg/dL    Creatinine 0.92 0.76 - 1.27 mg/dL    Sodium 138 136 - 145 mmol/L    Potassium 3.8 3.5 - 5.2 mmol/L    Chloride 105 98 - 107 mmol/L    CO2 26.0 22.0 - 29.0 mmol/L    Calcium 9.5 8.6 - 10.5 mg/dL    Total Protein 7.5 6.0 - 8.5 g/dL    Albumin 4.40 3.50 - 5.20 g/dL    ALT (SGPT) 16 1 - 41 U/L    AST (SGOT) 23 1 - 40 U/L    Alkaline Phosphatase 101 39 - 117 U/L    Total Bilirubin 0.5 0.0 - 1.2 mg/dL    Globulin 3.1 gm/dL    A/G Ratio 1.4 g/dL    BUN/Creatinine Ratio 8.7 7.0 - 25.0    Anion Gap 7.0 5.0 - 15.0 mmol/L    eGFR 98.2 >60.0 mL/min/1.73   Results for orders placed or performed in visit on 05/04/22   QuantiFERON TB Plus Client Incubated    Specimen: Blood   Result Value Ref Range    QuantiFERON Criteria Comment     QUANTIFERON TB1 AG VALUE 0.04 IU/mL    QUANTIFERON TB2 AG VALUE 0.05 IU/mL    QuantiFERON Nil Value 0.05 IU/mL    QuantiFERON Mitogen Value >10.00 IU/mL    QUANTIFERON-TB GOLD PLUS Negative Negative   Vitamin D 25 Hydroxy    Specimen: Blood   Result Value Ref Range    25 Hydroxy, Vitamin D 34.9 30.0 - 100.0 ng/ml   CBC (No Diff)    Specimen: Blood   Result Value Ref Range    WBC 11.50 (H) 3.40 - 10.80 10*3/mm3    RBC 4.82 4.14 - 5.80 10*6/mm3    Hemoglobin 16.4 13.0 - 17.7 g/dL    Hematocrit 49.3 37.5 - 51.0 %    .3 (H) 79.0 - 97.0 fL    MCH 34.0 (H) 26.6 - 33.0 pg    MCHC 33.3 31.5 - 35.7 g/dL    RDW 14.1 12.3 - 15.4 %    RDW-SD  53.5 37.0 - 54.0 fl    MPV 11.7 6.0 - 12.0 fL    Platelets 357 140 - 450 10*3/mm3   Vitamin B12    Specimen: Blood   Result Value Ref Range    Vitamin B-12 <150 (L) 211 - 946 pg/mL   Comprehensive Metabolic Panel    Specimen: Blood   Result Value Ref Range    Glucose 97 65 - 99 mg/dL    BUN 9 6 - 20 mg/dL    Creatinine 0.97 0.76 - 1.27 mg/dL    Sodium 137 136 - 145 mmol/L    Potassium 3.7 3.5 - 5.2 mmol/L    Chloride 103 98 - 107 mmol/L    CO2 23.4 22.0 - 29.0 mmol/L    Calcium 9.2 8.6 - 10.5 mg/dL    Total Protein 7.4 6.0 - 8.5 g/dL    Albumin 4.20 3.50 - 5.20 g/dL    ALT (SGPT) 27 1 - 41 U/L    AST (SGOT) 28 1 - 40 U/L    Alkaline Phosphatase 84 39 - 117 U/L    Total Bilirubin 0.7 0.0 - 1.2 mg/dL    Globulin 3.2 gm/dL    A/G Ratio 1.3 g/dL    BUN/Creatinine Ratio 9.3 7.0 - 25.0    Anion Gap 10.6 5.0 - 15.0 mmol/L    eGFR 92.2 >60.0 mL/min/1.73   Results for orders placed or performed during the hospital encounter of 11/30/21   Tissue Pathology Exam    Specimen: A: Large Intestine; Tissue    B: Small Intestine, Ileum; Tissue    C: Large Intestine, Right / Ascending Colon; Tissue    D: Large Intestine, Transverse Colon; Tissue    E: Large Intestine, Left / Descending Colon; Tissue    F: Large Intestine, Sigmoid Colon; Tissue    G: Large Intestine, Rectum; Tissue   Result Value Ref Range    Case Report       Surgical Pathology Report                         Case: [id number]                                  Authorizing Provider:  Dewayne Beavers MD        Collected:           11/30/2021 10:23 AM          Ordering Location:     [facility]   Received:            11/30/2021 02:35 PM                                 [place] [facility]                                                     Pathologist:           Jacek Villegas MD                                                          Specimens:   1) - Large Intestine, anastamosis MM                                                                2) -  Small Intestine, Ileum, terminal ileum MM                                                      3) - Large Intestine, Right / Ascending Colon, MM                                                   4) - Large Intestine, Transverse Colon, MM                                                          5) - Large Intestine, Left / Descending Colon, MM                                                    6) - Large Intestine, Sigmoid Colon, MM                                                             7) - Large Intestine, Rectum, MM                                                           Final Diagnosis       SEE SCANNED REPORT       Results for orders placed or performed in visit on 11/04/21   CBC (No Diff)    Specimen: Blood   Result Value Ref Range    WBC 10.63 3.40 - 10.80 10*3/mm3    RBC 4.54 4.14 - 5.80 10*6/mm3    Hemoglobin 15.2 13.0 - 17.7 g/dL    Hematocrit 43.4 37.5 - 51.0 %    MCV 95.6 79.0 - 97.0 fL    MCH 33.5 (H) 26.6 - 33.0 pg    MCHC 35.0 31.5 - 35.7 g/dL    RDW 16.2 (H) 12.3 - 15.4 %    RDW-SD 57.2 (H) 37.0 - 54.0 fl    MPV 10.2 6.0 - 12.0 fL    Platelets 389 140 - 450 10*3/mm3   Comprehensive Metabolic Panel    Specimen: Blood   Result Value Ref Range    Glucose 99 65 - 99 mg/dL    BUN 5 (L) 6 - 20 mg/dL    Creatinine 0.93 0.76 - 1.27 mg/dL    Sodium 139 136 - 145 mmol/L    Potassium 3.7 3.5 - 5.2 mmol/L    Chloride 103 98 - 107 mmol/L    CO2 27.0 22.0 - 29.0 mmol/L    Calcium 9.3 8.6 - 10.5 mg/dL    Total Protein 7.5 6.0 - 8.5 g/dL    Albumin 4.70 3.50 - 5.20 g/dL    ALT (SGPT) 19 1 - 41 U/L    AST (SGOT) 20 1 - 40 U/L    Alkaline Phosphatase 103 39 - 117 U/L    Total Bilirubin 0.5 0.0 - 1.2 mg/dL    eGFR Non African Amer 85 >60 mL/min/1.73    Globulin 2.8 gm/dL    A/G Ratio 1.7 g/dL    BUN/Creatinine Ratio 5.4 (L) 7.0 - 25.0    Anion Gap 9.0 5.0 - 15.0 mmol/L     *Note: Due to a large number of results and/or encounters for the requested time period, some results have not been displayed. A complete set  of results can be found in Results Review.

## 2022-12-20 ENCOUNTER — LAB REQUISITION (OUTPATIENT)
Dept: LAB | Facility: HOSPITAL | Age: 57
End: 2022-12-20

## 2022-12-20 DIAGNOSIS — L60.9 NAIL DISORDER, UNSPECIFIED: ICD-10-CM

## 2022-12-20 LAB — KOH PREP NAIL: NORMAL

## 2022-12-20 PROCEDURE — 87102 FUNGUS ISOLATION CULTURE: CPT | Performed by: NURSE PRACTITIONER

## 2022-12-20 PROCEDURE — 87220 TISSUE EXAM FOR FUNGI: CPT | Performed by: NURSE PRACTITIONER

## 2023-01-17 LAB — FUNGUS WND CULT: NORMAL

## 2023-02-17 RX ORDER — OMEPRAZOLE 20 MG/1
CAPSULE, DELAYED RELEASE ORAL
Qty: 180 CAPSULE | Refills: 1 | Status: SHIPPED | OUTPATIENT
Start: 2023-02-17

## 2023-06-06 ENCOUNTER — OFFICE VISIT (OUTPATIENT)
Dept: GASTROENTEROLOGY | Facility: CLINIC | Age: 58
End: 2023-06-06
Payer: MEDICARE

## 2023-06-06 VITALS
HEIGHT: 72 IN | HEART RATE: 74 BPM | WEIGHT: 178.2 LBS | DIASTOLIC BLOOD PRESSURE: 86 MMHG | SYSTOLIC BLOOD PRESSURE: 142 MMHG | BODY MASS INDEX: 24.14 KG/M2

## 2023-06-06 DIAGNOSIS — R13.19 ESOPHAGEAL DYSPHAGIA: ICD-10-CM

## 2023-06-06 DIAGNOSIS — K21.00 GASTROESOPHAGEAL REFLUX DISEASE WITH ESOPHAGITIS WITHOUT HEMORRHAGE: ICD-10-CM

## 2023-06-06 DIAGNOSIS — K50.00 CROHN'S DISEASE OF SMALL INTESTINE WITHOUT COMPLICATION: Primary | ICD-10-CM

## 2023-06-06 PROCEDURE — 99213 OFFICE O/P EST LOW 20 MIN: CPT | Performed by: NURSE PRACTITIONER

## 2023-06-06 RX ORDER — SODIUM CHLORIDE 9 MG/ML
40 INJECTION, SOLUTION INTRAVENOUS AS NEEDED
OUTPATIENT
Start: 2023-06-06

## 2023-06-06 RX ORDER — DEXTROSE AND SODIUM CHLORIDE 5; .45 G/100ML; G/100ML
30 INJECTION, SOLUTION INTRAVENOUS CONTINUOUS PRN
OUTPATIENT
Start: 2023-06-06

## 2023-06-06 RX ORDER — AZELASTINE HYDROCHLORIDE 137 UG/1
SPRAY, METERED NASAL
COMMUNITY
Start: 2023-06-05

## 2023-06-06 RX ORDER — MONTELUKAST SODIUM 10 MG/1
TABLET ORAL
COMMUNITY
Start: 2023-05-22

## 2023-06-06 NOTE — PROGRESS NOTES
Chief Complaint   Patient presents with    Crohn's Disease       Subjective    Jose Trent is a 57 y.o. male. he is here today for follow-up.                                                                  Assessment & Plan                                     1. Crohn's disease of small intestine without complication    2. Esophageal dysphagia    3. Gastroesophageal reflux disease with esophagitis without hemorrhage      Plan; schedule patient for EGD due to new problem of dysphagia with chronic reflux discussed dilation if indicated encouraged safe swallowing measures standard antireflux measures and low acid diet.  Seek emergency medical attention if symptoms become severe or worsen follow-up after test return office sooner if needed    Follow-up: Return in about 6 months (around 12/6/2023) for Recheck.     HPI  57-year-old male presents to discuss Crohn disease and new problem of dysphagia which started 2 to 3 months ago has had issues swallowing pills and most meats.  Has chronic acid reflux controlled with omeprazole.  Reports Crohn's has been well controlled with Humira injection every 2 weeks.  Denies any flare of diarrhea blood in stool or melena.  Previous colonoscopy November 2022 noted localized areas of erythematous mucosa at anastomosis biopsies were normal.  Bowel movements are still about 5 to 6/day has occasional formed stool.  He did follow-up with dermatology for surveillance.    Review of Systems  Review of Systems   Constitutional:  Positive for fatigue. Negative for activity change, appetite change, chills, diaphoresis, fever and unexpected weight change.   HENT:  Positive for congestion (a lot of allergy and drainage issues) and trouble swallowing (started a few months ago pills and solid foods). Negative for sore throat.    Respiratory:  Negative for shortness of  "breath.    Gastrointestinal:  Positive for abdominal pain. Negative for abdominal distention, anal bleeding, blood in stool, constipation, diarrhea, nausea, rectal pain and vomiting.   Musculoskeletal:  Negative for arthralgias.   Skin:  Negative for pallor.   Neurological:  Negative for light-headedness.     /86 (BP Location: Left arm)   Pulse 74   Ht 182.9 cm (72\")   Wt 80.8 kg (178 lb 3.2 oz)   BMI 24.17 kg/m²     Objective      Physical Exam  Constitutional:       General: He is not in acute distress.     Appearance: Normal appearance. He is normal weight. He is not ill-appearing or toxic-appearing.   HENT:      Head: Normocephalic and atraumatic.   Pulmonary:      Effort: Pulmonary effort is normal.   Abdominal:      General: Abdomen is flat. Bowel sounds are normal. There is no distension.      Palpations: Abdomen is soft. There is no mass.      Tenderness: There is no abdominal tenderness.   Neurological:      Mental Status: He is alert.             The following portions of the patient's history were reviewed and updated as appropriate:   Past Medical History:   Diagnosis Date    Backache     of indeterminate etiology    Cobalamin deficiency     Crohn's disease     With ileocolic stricture now resected    Crohn's disease, small intestine     Drug therapy     long term    GERD (gastroesophageal reflux disease)     Partial obstruction of small intestine     Pharyngitis     Portal hypertension     Portal vein thrombosis     And splenic vein    Rhinitis     Tobacco dependence syndrome     encouraged to stop    Upper respiratory infection      Past Surgical History:   Procedure Laterality Date    COLECTOMY PARTIAL / TOTAL      Resection of segment of sigmoid colon, resection of 45 cm of ileum, right colon, with ileotransverse colon anastomosis; resection of segment of bladder, suprapubic cystostomy. 03/28/1984        COLONOSCOPY  07/02/2015    COLONOSCOPY N/A 2/22/2019    Procedure: COLONOSCOPY;  " Surgeon: Dewayne Beavers MD;  Location: [facility] ENDOSCOPY;  Service: Gastroenterology    COLONOSCOPY N/A 9/23/2020    Procedure: COLONOSCOPY WED;  Surgeon: Dewayne Beavers MD;  Location: [facility] ENDOSCOPY;  Service: Gastroenterology;  Laterality: N/A;    COLONOSCOPY N/A 11/30/2021    Procedure: COLONOSCOPY;  Surgeon: Dewayne Beavers MD;  Location: [facility] ENDOSCOPY;  Service: Gastroenterology;  Laterality: N/A;    COLONOSCOPY N/A 11/28/2022    Procedure: COLONOSCOPY;  Surgeon: Dewayne Beavers MD;  Location: [facility] ENDOSCOPY;  Service: Gastroenterology;  Laterality: N/A;    ENDOSCOPY      Prior resect of cecum & ascend colon found.Muoosa beyond strict has a normal appear.Stenosis of anastomosis, will not allow pass of scope.Linear ulcerat of anastomosis.Narrow strict & not long strict as reported by xray. Dilatation performed. 6/26/2014        ENDOSCOPY      Normal hypopharynx, esophagus, dudoenum, symmetrical & patent pylorus. Hiatus hernia in GE junction. Mild non-erosive gastritis in antrum. Biopsy taken. 01/31/2013        EXPLORATORY LAPAROTOMY      Exploratory laparotomy with adhesiolysis.Takedown of previous anastomosis.Resection of terminal ileum and proximal transverse colon w/a stapled CODY anastomosis between ileum and transverse colon. 03/07/2016        SPLENECTOMY       Splenomegaly and hypersplenism 11/02/2007     UPPER GASTROINTESTINAL ENDOSCOPY  01/31/2013     Family History   Problem Relation Age of Onset    Cancer Other        No Known Allergies  Social History     Socioeconomic History    Marital status:    Tobacco Use    Smoking status: Every Day     Packs/day: 1.00     Types: Cigarettes    Smokeless tobacco: Never   Vaping Use    Vaping Use: Never used   Substance and Sexual Activity    Alcohol use: Never    Drug use: Never    Sexual activity: Defer     Comment:      Current Medications:  Prior to Admission medications    Medication Sig Start Date End Date Taking? Authorizing  Provider   adalimumab (Humira) 40 MG/0.8ML Prefilled Syringe Kit injection Inject 0.8 mL under the skin into the appropriate area as directed Every 14 (Fourteen) Days. 11/11/22  Yes Torrie Paul APRN   aspirin 81 MG EC tablet Take 1 tablet by mouth Daily.   Yes Chandrika Bocanegra MD   Azelastine HCl 137 MCG/SPRAY solution  6/5/23  Yes Chandrika Bocanegra MD   EPINEPHrine (EPIPEN) 0.3 MG/0.3ML solution auto-injector injection  11/30/22  Yes Chandrika Bocanegra MD   hydrOXYzine (ATARAX) 10 MG tablet Take 1 tablet by mouth At Night As Needed. 4/7/21  Yes Chandrika Bocanegra MD   omeprazole (priLOSEC) 20 MG capsule TAKE 1 CAPSULE TWICE DAILY 2/17/23  Yes Torrie Paul APRN   montelukast (SINGULAIR) 10 MG tablet TAKE 1 TABLET BY MOUTH once every MORNING  Patient not taking: Reported on 6/6/2023 5/22/23   Chandrika Bocanegra MD     Orders placed during this encounter include:  Orders Placed This Encounter   Procedures    Obtain Informed Consent     Standing Status:   Future     Order Specific Question:   Informed Consent Given For     Answer:   ESOPHAGOGASTRODUODENOSCOPY possible dilation     ESOPHAGOGASTRODUODENOSCOPY possible dilation (N/A)  No orders of the defined types were placed in this encounter.        Review and/or summary of lab tests, radiology, procedures, medications. Review and summary of old records and obtaining of history. The risks and benefits of my recommendations, as well as other treatment options were discussed . Any questions/concerned were answered. Patient voiced understanding and agreement.          This document has been electronically signed by VEENA Cantu on June 6, 2023 11:13 CDT                                               Results for orders placed or performed in visit on 12/20/22   KOH Prep - Nail, Hand, Digit Left    Specimen: Hand, Digit Left; Nail   Result Value Ref Range    KOH Prep No yeast or hyphal elements seen No yeast or hyphal elements seen   Fungus Culture     Specimen: Hand, Digit Left; Nail   Result Value Ref Range    Fungus Culture No fungus isolated at 4 weeks    Results for orders placed or performed during the hospital encounter of 22   TISSUE EXAM, [facility] ([facility])    Specimen: A: Large Intestine; Tissue    B: Small Intestine, Ileum; Tissue   Result Value Ref Range    Reference Lab Report       [facility]  [address] [place] [address]  Phone: [phone] or [phone]  Fax: [phone]  Jacek Villegas M.D., Medical Director    PATIENT NAME                                     LABORATORY NO.  [id number]   JOSEMANUEL MCALLISTER.                    [id number]  [id number]                                 AGE                    SEX   SSN              CLIENT REF #  [facility]                   57        1965           [id number]      [id number]    [place]                               REQUESTING M.D.           ATTENDING [place] [address]                     DATE COLLECTED            DATE RECEIVED          DATE REPORTED  2022    DIAGNOSIS:  A.     COLON, BIOPSY:  Colonic mucosa with no significant histopathologic abnormality  No evidence of dysplasia, carcinoma, or  microscopic colitis    B.     TERMINAL ILEUM BIOPSY:  Ileal mucosa with no significant histopathologic abnormality  Negative for dysplasia or carcinoma    CLINICAL HISTORY:  Crohn's disease of small intestine without complication    SPECIMENS RECEIVED:  A.    COLON, BIOPSY  B.    TERMINAL ILEUM BIOPSY    MICROSCOPIC DESCRIPTION:  Tissue blocks are prepared and slides are examined microscopically on all  specimens. See diagnosis for details.    Professional interpretation rendered by Asim Ronquillo M.D.,F.C.A.P. at [facility], [address] [place] [address].    GROSS  "DESCRIPTION:  A.    Specimen is received in 1 formalin filled container labeled \"colon mucosa  cold biopsy\" and consists of 3 portions of tan soft tissue measuring 0.5 x  0.5 x 0.1 cm.  The specimen is submitted entirely in 1 cassette.  BW  B.    Specimen is received in 1 formalin filled container labeled \"terminal ileum  cold biopsy\" and consists of 3 portions of tan soft tissue measuring 0.5 x  0.4 x 0.2 cm.   The smallest portions may not survive processing and the  specimen is submitted entirely in 1 cassette.    REVIEWED, DIAGNOSED AND ELECTRONICALLY  SIGNED BY:    Asim Ronquillo M.D.,F.C.A.P.  CPT CODES:  88305x2     Results for orders placed or performed in visit on 11/07/22   Hepatitis C Antibody    Specimen: Blood   Result Value Ref Range    Hepatitis C Ab Non-Reactive Non-Reactive   Hepatitis B Surface Antigen    Specimen: Blood   Result Value Ref Range    Hepatitis B Surface Ag Non-Reactive Non-Reactive   CBC (No Diff)    Specimen: Blood   Result Value Ref Range    WBC 11.35 (H) 3.40 - 10.80 10*3/mm3    RBC 4.88 4.14 - 5.80 10*6/mm3    Hemoglobin 16.1 13.0 - 17.7 g/dL    Hematocrit 48.1 37.5 - 51.0 %    MCV 98.6 (H) 79.0 - 97.0 fL    MCH 33.0 26.6 - 33.0 pg    MCHC 33.5 31.5 - 35.7 g/dL    RDW 14.0 12.3 - 15.4 %    RDW-SD 50.9 37.0 - 54.0 fl    MPV 11.1 6.0 - 12.0 fL    Platelets 434 140 - 450 10*3/mm3   Vitamin B12    Specimen: Blood   Result Value Ref Range    Vitamin B-12 <150 (L) 211 - 946 pg/mL   Comprehensive Metabolic Panel    Specimen: Blood   Result Value Ref Range    Glucose 90 65 - 99 mg/dL    BUN 8 6 - 20 mg/dL    Creatinine 0.92 0.76 - 1.27 mg/dL    Sodium 138 136 - 145 mmol/L    Potassium 3.8 3.5 - 5.2 mmol/L    Chloride 105 98 - 107 mmol/L    CO2 26.0 22.0 - 29.0 mmol/L    Calcium 9.5 8.6 - 10.5 mg/dL    Total Protein 7.5 6.0 - 8.5 g/dL    Albumin 4.40 3.50 - 5.20 g/dL    ALT (SGPT) 16 1 - 41 U/L    AST (SGOT) 23 1 - 40 U/L    Alkaline Phosphatase 101 39 - 117 U/L    Total Bilirubin 0.5 0.0 " - 1.2 mg/dL    Globulin 3.1 gm/dL    A/G Ratio 1.4 g/dL    BUN/Creatinine Ratio 8.7 7.0 - 25.0    Anion Gap 7.0 5.0 - 15.0 mmol/L    eGFR 98.2 >60.0 mL/min/1.73   Results for orders placed or performed in visit on 05/04/22   QuantiFERON TB Plus Client Incubated    Specimen: Blood   Result Value Ref Range    QuantiFERON Criteria Comment     QUANTIFERON TB1 AG VALUE 0.04 IU/mL    QUANTIFERON TB2 AG VALUE 0.05 IU/mL    QuantiFERON Nil Value 0.05 IU/mL    QuantiFERON Mitogen Value >10.00 IU/mL    QUANTIFERON-TB GOLD PLUS Negative Negative   Vitamin D 25 Hydroxy    Specimen: Blood   Result Value Ref Range    25 Hydroxy, Vitamin D 34.9 30.0 - 100.0 ng/ml   CBC (No Diff)    Specimen: Blood   Result Value Ref Range    WBC 11.50 (H) 3.40 - 10.80 10*3/mm3    RBC 4.82 4.14 - 5.80 10*6/mm3    Hemoglobin 16.4 13.0 - 17.7 g/dL    Hematocrit 49.3 37.5 - 51.0 %    .3 (H) 79.0 - 97.0 fL    MCH 34.0 (H) 26.6 - 33.0 pg    MCHC 33.3 31.5 - 35.7 g/dL    RDW 14.1 12.3 - 15.4 %    RDW-SD 53.5 37.0 - 54.0 fl    MPV 11.7 6.0 - 12.0 fL    Platelets 357 140 - 450 10*3/mm3   Vitamin B12    Specimen: Blood   Result Value Ref Range    Vitamin B-12 <150 (L) 211 - 946 pg/mL   Comprehensive Metabolic Panel    Specimen: Blood   Result Value Ref Range    Glucose 97 65 - 99 mg/dL    BUN 9 6 - 20 mg/dL    Creatinine 0.97 0.76 - 1.27 mg/dL    Sodium 137 136 - 145 mmol/L    Potassium 3.7 3.5 - 5.2 mmol/L    Chloride 103 98 - 107 mmol/L    CO2 23.4 22.0 - 29.0 mmol/L    Calcium 9.2 8.6 - 10.5 mg/dL    Total Protein 7.4 6.0 - 8.5 g/dL    Albumin 4.20 3.50 - 5.20 g/dL    ALT (SGPT) 27 1 - 41 U/L    AST (SGOT) 28 1 - 40 U/L    Alkaline Phosphatase 84 39 - 117 U/L    Total Bilirubin 0.7 0.0 - 1.2 mg/dL    Globulin 3.2 gm/dL    A/G Ratio 1.3 g/dL    BUN/Creatinine Ratio 9.3 7.0 - 25.0    Anion Gap 10.6 5.0 - 15.0 mmol/L    eGFR 92.2 >60.0 mL/min/1.73   Results for orders placed or performed during the hospital encounter of 11/30/21   Tissue Pathology  Exam    Specimen: A: Large Intestine; Tissue    B: Small Intestine, Ileum; Tissue    C: Large Intestine, Right / Ascending Colon; Tissue    D: Large Intestine, Transverse Colon; Tissue    E: Large Intestine, Left / Descending Colon; Tissue    F: Large Intestine, Sigmoid Colon; Tissue    G: Large Intestine, Rectum; Tissue   Result Value Ref Range    Case Report       Surgical Pathology Report                         Case: [id number]                                  Authorizing Provider:  Dewayne Beavers MD        Collected:           11/30/2021 10:23 AM          Ordering Location:     [facility]   Received:            11/30/2021 02:35 PM                                 [facility]                                                     Pathologist:           Jacek Villegas MD                                                          Specimens:   1) - Large Intestine, anastamosis MM                                                                2) - Small Intestine, Ileum, terminal ileum MM                                                      3) - Large Intestine, Right / Ascending Colon, MM                                                   4) - Large Intestine, Transverse Colon, MM                                                          5) - Large Intestine, Left / Descending Colon, MM                                                    6) - Large Intestine, Sigmoid Colon, MM                                                             7) - Large Intestine, Rectum, MM                                                           Final Diagnosis       SEE SCANNED REPORT       Results for orders placed or performed in visit on 11/04/21   CBC (No Diff)    Specimen: Blood   Result Value Ref Range    WBC 10.63 3.40 - 10.80 10*3/mm3    RBC 4.54 4.14 - 5.80 10*6/mm3    Hemoglobin 15.2 13.0 - 17.7 g/dL    Hematocrit 43.4 37.5 - 51.0 %    MCV 95.6 79.0 - 97.0 fL    MCH 33.5 (H) 26.6 - 33.0 pg    MCHC 35.0  31.5 - 35.7 g/dL    RDW 16.2 (H) 12.3 - 15.4 %    RDW-SD 57.2 (H) 37.0 - 54.0 fl    MPV 10.2 6.0 - 12.0 fL    Platelets 389 140 - 450 10*3/mm3   Comprehensive Metabolic Panel    Specimen: Blood   Result Value Ref Range    Glucose 99 65 - 99 mg/dL    BUN 5 (L) 6 - 20 mg/dL    Creatinine 0.93 0.76 - 1.27 mg/dL    Sodium 139 136 - 145 mmol/L    Potassium 3.7 3.5 - 5.2 mmol/L    Chloride 103 98 - 107 mmol/L    CO2 27.0 22.0 - 29.0 mmol/L    Calcium 9.3 8.6 - 10.5 mg/dL    Total Protein 7.5 6.0 - 8.5 g/dL    Albumin 4.70 3.50 - 5.20 g/dL    ALT (SGPT) 19 1 - 41 U/L    AST (SGOT) 20 1 - 40 U/L    Alkaline Phosphatase 103 39 - 117 U/L    Total Bilirubin 0.5 0.0 - 1.2 mg/dL    eGFR Non African Amer 85 >60 mL/min/1.73    Globulin 2.8 gm/dL    A/G Ratio 1.7 g/dL    BUN/Creatinine Ratio 5.4 (L) 7.0 - 25.0    Anion Gap 9.0 5.0 - 15.0 mmol/L     *Note: Due to a large number of results and/or encounters for the requested time period, some results have not been displayed. A complete set of results can be found in Results Review.

## 2023-07-26 ENCOUNTER — HOSPITAL ENCOUNTER (OUTPATIENT)
Facility: HOSPITAL | Age: 58
Setting detail: HOSPITAL OUTPATIENT SURGERY
Discharge: HOME | End: 2023-07-26
Attending: INTERNAL MEDICINE | Admitting: INTERNAL MEDICINE
Payer: MEDICARE

## 2023-07-26 ENCOUNTER — ANESTHESIA (OUTPATIENT)
Dept: GASTROENTEROLOGY | Facility: HOSPITAL | Age: 58
End: 2023-07-26
Payer: MEDICARE

## 2023-07-26 ENCOUNTER — ANESTHESIA EVENT (OUTPATIENT)
Dept: GASTROENTEROLOGY | Facility: HOSPITAL | Age: 58
End: 2023-07-26
Payer: MEDICARE

## 2023-07-26 VITALS
OXYGEN SATURATION: 96 % | HEIGHT: 72 IN | BODY MASS INDEX: 24.38 KG/M2 | TEMPERATURE: 97.3 F | WEIGHT: 180 LBS | SYSTOLIC BLOOD PRESSURE: 114 MMHG | DIASTOLIC BLOOD PRESSURE: 77 MMHG | RESPIRATION RATE: 20 BRPM | HEART RATE: 67 BPM

## 2023-07-26 DIAGNOSIS — R13.19 ESOPHAGEAL DYSPHAGIA: ICD-10-CM

## 2023-07-26 DIAGNOSIS — K21.00 GASTROESOPHAGEAL REFLUX DISEASE WITH ESOPHAGITIS WITHOUT HEMORRHAGE: ICD-10-CM

## 2023-07-26 PROCEDURE — C1769 GUIDE WIRE: HCPCS | Performed by: INTERNAL MEDICINE

## 2023-07-26 PROCEDURE — 25010000002 PROPOFOL 10 MG/ML EMULSION

## 2023-07-26 PROCEDURE — 43248 EGD GUIDE WIRE INSERTION: CPT | Performed by: INTERNAL MEDICINE

## 2023-07-26 PROCEDURE — 88342 IMHCHEM/IMCYTCHM 1ST ANTB: CPT

## 2023-07-26 PROCEDURE — 88305 TISSUE EXAM BY PATHOLOGIST: CPT

## 2023-07-26 PROCEDURE — 43239 EGD BIOPSY SINGLE/MULTIPLE: CPT | Performed by: INTERNAL MEDICINE

## 2023-07-26 RX ORDER — PROPOFOL 10 MG/ML
VIAL (ML) INTRAVENOUS AS NEEDED
Status: DISCONTINUED | OUTPATIENT
Start: 2023-07-26 | End: 2023-07-26 | Stop reason: SURG

## 2023-07-26 RX ORDER — DEXTROSE AND SODIUM CHLORIDE 5; .45 G/100ML; G/100ML
30 INJECTION, SOLUTION INTRAVENOUS CONTINUOUS PRN
Status: DISCONTINUED | OUTPATIENT
Start: 2023-07-26 | End: 2023-07-26 | Stop reason: HOSPADM

## 2023-07-26 RX ORDER — LIDOCAINE HYDROCHLORIDE 20 MG/ML
INJECTION, SOLUTION INTRAVENOUS AS NEEDED
Status: DISCONTINUED | OUTPATIENT
Start: 2023-07-26 | End: 2023-07-26 | Stop reason: SURG

## 2023-07-26 RX ADMIN — LIDOCAINE HYDROCHLORIDE 100 MG: 20 INJECTION, SOLUTION INTRAVENOUS at 10:24

## 2023-07-26 RX ADMIN — PROPOFOL 130 MG: 10 INJECTION, EMULSION INTRAVENOUS at 10:25

## 2023-07-26 RX ADMIN — PROPOFOL 70 MG: 10 INJECTION, EMULSION INTRAVENOUS at 10:24

## 2023-07-26 RX ADMIN — DEXTROSE AND SODIUM CHLORIDE 30 ML/HR: 5; 450 INJECTION, SOLUTION INTRAVENOUS at 10:08

## 2023-07-26 NOTE — H&P
No chief complaint on file.      Subjective    Jose Trent is a 57 y.o. male. he is here today for follow-up.                                                                  Assessment & Plan                                     No diagnosis found.    Plan; schedule patient for EGD due to new problem of dysphagia with chronic reflux discussed dilation if indicated encouraged safe swallowing measures standard antireflux measures and low acid diet.  Seek emergency medical attention if symptoms become severe or worsen follow-up after test return office sooner if needed    Follow-up: No follow-ups on file.     HPI I agree with the current note with no changes in the history.  Risks and benefits discussed with patient. Patient understands these and would like to proceed with procedure.    57-year-old male presents to discuss Crohn disease and new problem of dysphagia which started 2 to 3 months ago has had issues swallowing pills and most meats.  Has chronic acid reflux controlled with omeprazole.  Reports Crohn's has been well controlled with Humira injection every 2 weeks.  Denies any flare of diarrhea blood in stool or melena.  Previous colonoscopy November 2022 noted localized areas of erythematous mucosa at anastomosis biopsies were normal.  Bowel movements are still about 5 to 6/day has occasional formed stool.  He did follow-up with dermatology for surveillance.    Review of Systems  Review of Systems   Constitutional:  Positive for fatigue. Negative for activity change, appetite change, chills, diaphoresis, fever and unexpected weight change.   HENT:  Positive for congestion (a lot of allergy and drainage issues) and trouble swallowing (started a few months ago pills and solid foods). Negative for sore throat.    Respiratory:  Negative for shortness of breath.    Gastrointestinal:   "Positive for abdominal pain. Negative for abdominal distention, anal bleeding, blood in stool, constipation, diarrhea, nausea, rectal pain and vomiting.   Musculoskeletal:  Negative for arthralgias.   Skin:  Negative for pallor.   Neurological:  Negative for light-headedness.     Ht 182.9 cm (72\")   Wt 81.6 kg (180 lb)   BMI 24.41 kg/m² /77   Pulse 67   Temp 97.3 °F (36.3 °C)   Resp 20   Ht 182.9 cm (72\")   Wt 81.6 kg (180 lb)   SpO2 96%   BMI 24.41 kg/m²       Objective      Physical Exam  Constitutional:       General: He is not in acute distress.     Appearance: Normal appearance. He is normal weight. He is not ill-appearing or toxic-appearing.   HENT:      Head: Normocephalic and atraumatic.   Pulmonary:      Effort: Pulmonary effort is normal.   Abdominal:      General: Abdomen is flat. Bowel sounds are normal. There is no distension.      Palpations: Abdomen is soft. There is no mass.      Tenderness: There is no abdominal tenderness.   Neurological:      Mental Status: He is alert.             The following portions of the patient's history were reviewed and updated as appropriate:   Past Medical History:   Diagnosis Date    Backache     of indeterminate etiology    Cobalamin deficiency     Crohn's disease     With ileocolic stricture now resected    Crohn's disease, small intestine     Drug therapy     long term    GERD (gastroesophageal reflux disease)     Partial obstruction of small intestine     Pharyngitis     Portal hypertension     Portal vein thrombosis     And splenic vein    Rhinitis     Tobacco dependence syndrome     encouraged to stop    Upper respiratory infection      Past Surgical History:   Procedure Laterality Date    COLECTOMY PARTIAL / TOTAL      Resection of segment of sigmoid colon, resection of 45 cm of ileum, right colon, with ileotransverse colon anastomosis; resection of segment of bladder, suprapubic cystostomy. 03/28/1984        COLONOSCOPY  07/02/2015    " COLONOSCOPY N/A 2/22/2019    Procedure: COLONOSCOPY;  Surgeon: Dewayne Beavers MD;  Location: [facility] ENDOSCOPY;  Service: Gastroenterology    COLONOSCOPY N/A 9/23/2020    Procedure: COLONOSCOPY WED;  Surgeon: Dewayne Beavers MD;  Location: [facility] ENDOSCOPY;  Service: Gastroenterology;  Laterality: N/A;    COLONOSCOPY N/A 11/30/2021    Procedure: COLONOSCOPY;  Surgeon: Dewayne Beavers MD;  Location: [facility] ENDOSCOPY;  Service: Gastroenterology;  Laterality: N/A;    COLONOSCOPY N/A 11/28/2022    Procedure: COLONOSCOPY;  Surgeon: Dewayne Beavers MD;  Location: [facility] ENDOSCOPY;  Service: Gastroenterology;  Laterality: N/A;    ENDOSCOPY      Prior resect of cecum & ascend colon found.[place] beyond strict has a normal appear.Stenosis of anastomosis, will not allow pass of scope.Linear ulcerat of anastomosis.Narrow strict & not long strict as reported by xray. Dilatation performed. 6/26/2014        ENDOSCOPY      Normal hypopharynx, esophagus, dudoenum, symmetrical & patent pylorus. Hiatus hernia in GE junction. Mild non-erosive gastritis in antrum. Biopsy taken. 01/31/2013        EXPLORATORY LAPAROTOMY      Exploratory laparotomy with adhesiolysis.Takedown of previous anastomosis.Resection of terminal ileum and proximal transverse colon w/a stapled CODY anastomosis between ileum and transverse colon. 03/07/2016        SPLENECTOMY       Splenomegaly and hypersplenism 11/02/2007     UPPER GASTROINTESTINAL ENDOSCOPY  01/31/2013     Family History   Problem Relation Age of Onset    Cancer Other        No Known Allergies  Social History     Socioeconomic History    Marital status:    Tobacco Use    Smoking status: Every Day     Packs/day: 1.00     Types: Cigarettes    Smokeless tobacco: Never   Vaping Use    Vaping Use: Never used   Substance and Sexual Activity    Alcohol use: Never    Drug use: Never    Sexual activity: Defer     Comment:      Current Medications:  Prior to Admission medications     Medication Sig Start Date End Date Taking? Authorizing Provider   adalimumab (Humira) 40 MG/0.8ML Prefilled Syringe Kit injection Inject 0.8 mL under the skin into the appropriate area as directed Every 14 (Fourteen) Days. 11/11/22  Yes Torrie Paul APRN   aspirin 81 MG EC tablet Take 1 tablet by mouth Daily.   Yes Chandrika Bocanegra MD   Azelastine HCl 137 MCG/SPRAY solution  6/5/23  Yes Chandrika Bocanegra MD   EPINEPHrine (EPIPEN) 0.3 MG/0.3ML solution auto-injector injection  11/30/22  Yes Chandrika Bocanegra MD   hydrOXYzine (ATARAX) 10 MG tablet Take 1 tablet by mouth At Night As Needed. 4/7/21  Yes Chandrika Bocanegra MD   omeprazole (priLOSEC) 20 MG capsule TAKE 1 CAPSULE TWICE DAILY 2/17/23  Yes Torrie Paul APRN   montelukast (SINGULAIR) 10 MG tablet TAKE 1 TABLET BY MOUTH once every MORNING  Patient not taking: Reported on 6/6/2023 5/22/23   Chandrika Bocanegra MD     Orders placed during this encounter include:  No orders of the defined types were placed in this encounter.    ESOPHAGOGASTRODUODENOSCOPY possible dilation (N/A)  No orders of the defined types were placed in this encounter.        Review and/or summary of lab tests, radiology, procedures, medications. Review and summary of old records and obtaining of history. The risks and benefits of my recommendations, as well as other treatment options were discussed . Any questions/concerned were answered. Patient voiced understanding and agreement.          This document has been electronically signed by Dewayne Beavers MD on July 26, 2023 09:50 CDT                                               Results for orders placed or performed in visit on 12/20/22   KOH Prep - Nail, Hand, Digit Left    Specimen: Hand, Digit Left; Nail   Result Value Ref Range    KOH Prep No yeast or hyphal elements seen No yeast or hyphal elements seen   Fungus Culture    Specimen: Hand, Digit Left; Nail   Result Value Ref Range    Fungus Culture No fungus  "isolated at 4 weeks    Results for orders placed or performed during the hospital encounter of 22   TISSUE EXAM, [facility])    Specimen: A: Large Intestine; Tissue    B: Small Intestine, Ileum; Tissue   Result Value Ref Range    Reference Lab Report       [facility]  [address] [place] [address]  Phone: [phone] or [phone]  Fax: [phone]  Jacek Villegas M.D., Medical Director    PATIENT NAME                                     LABORATORY NO.  JOSEMANUEL BURCIAGA.                    [id number]  [id number]                                 AGE                    SEX   SSN              CLIENT REF #  [facility]                   57        1965           [id number]      [id number]    [place]                               REQUESTING M.D.           [place] [address]                     DATE COLLECTED            DATE RECEIVED          DATE REPORTED  2022    DIAGNOSIS:  A.     COLON, BIOPSY:  Colonic mucosa with no significant histopathologic abnormality  No evidence of dysplasia, carcinoma, or  microscopic colitis    B.     TERMINAL ILEUM BIOPSY:  Ileal mucosa with no significant histopathologic abnormality  Negative for dysplasia or carcinoma    CLINICAL HISTORY:  Crohn's disease of small intestine without complication    SPECIMENS RECEIVED:  A.    COLON, BIOPSY  B.    TERMINAL ILEUM BIOPSY    MICROSCOPIC DESCRIPTION:  Tissue blocks are prepared and slides are examined microscopically on all  specimens. See diagnosis for details.    Professional interpretation rendered by Asim Ronquillo M.D.,F.C.A.P. at [facility] [place], [address] [place] [address], [place] [address].    GROSS DESCRIPTION:  A.    Specimen is received in 1 formalin filled container labeled \"colon " "mucosa  cold biopsy\" and consists of 3 portions of tan soft tissue measuring 0.5 x  0.5 x 0.1 cm.  The specimen is submitted entirely in 1 cassette.  BW  B.    Specimen is received in 1 formalin filled container labeled \"terminal ileum  cold biopsy\" and consists of 3 portions of tan soft tissue measuring 0.5 x  0.4 x 0.2 cm.   The smallest portions may not survive processing and the  specimen is submitted entirely in 1 cassette.    REVIEWED, DIAGNOSED AND ELECTRONICALLY  SIGNED BY:    Asim Ronquillo M.D.,F.C.A.P.  CPT CODES:  88305x2     Results for orders placed or performed in visit on 11/07/22   Hepatitis C Antibody    Specimen: Blood   Result Value Ref Range    Hepatitis C Ab Non-Reactive Non-Reactive   Hepatitis B Surface Antigen    Specimen: Blood   Result Value Ref Range    Hepatitis B Surface Ag Non-Reactive Non-Reactive   CBC (No Diff)    Specimen: Blood   Result Value Ref Range    WBC 11.35 (H) 3.40 - 10.80 10*3/mm3    RBC 4.88 4.14 - 5.80 10*6/mm3    Hemoglobin 16.1 13.0 - 17.7 g/dL    Hematocrit 48.1 37.5 - 51.0 %    MCV 98.6 (H) 79.0 - 97.0 fL    MCH 33.0 26.6 - 33.0 pg    MCHC 33.5 31.5 - 35.7 g/dL    RDW 14.0 12.3 - 15.4 %    RDW-SD 50.9 37.0 - 54.0 fl    MPV 11.1 6.0 - 12.0 fL    Platelets 434 140 - 450 10*3/mm3   Vitamin B12    Specimen: Blood   Result Value Ref Range    Vitamin B-12 <150 (L) 211 - 946 pg/mL   Comprehensive Metabolic Panel    Specimen: Blood   Result Value Ref Range    Glucose 90 65 - 99 mg/dL    BUN 8 6 - 20 mg/dL    Creatinine 0.92 0.76 - 1.27 mg/dL    Sodium 138 136 - 145 mmol/L    Potassium 3.8 3.5 - 5.2 mmol/L    Chloride 105 98 - 107 mmol/L    CO2 26.0 22.0 - 29.0 mmol/L    Calcium 9.5 8.6 - 10.5 mg/dL    Total Protein 7.5 6.0 - 8.5 g/dL    Albumin 4.40 3.50 - 5.20 g/dL    ALT (SGPT) 16 1 - 41 U/L    AST (SGOT) 23 1 - 40 U/L    Alkaline Phosphatase 101 39 - 117 U/L    Total Bilirubin 0.5 0.0 - 1.2 mg/dL    Globulin 3.1 gm/dL    A/G Ratio 1.4 g/dL    BUN/Creatinine Ratio 8.7 " 7.0 - 25.0    Anion Gap 7.0 5.0 - 15.0 mmol/L    eGFR 98.2 >60.0 mL/min/1.73   Results for orders placed or performed in visit on 05/04/22   QuantiFERON TB Plus Client Incubated    Specimen: Blood   Result Value Ref Range    QuantiFERON Criteria Comment     QUANTIFERON TB1 AG VALUE 0.04 IU/mL    QUANTIFERON TB2 AG VALUE 0.05 IU/mL    QuantiFERON Nil Value 0.05 IU/mL    QuantiFERON Mitogen Value >10.00 IU/mL    QUANTIFERON-TB GOLD PLUS Negative Negative   Vitamin D 25 Hydroxy    Specimen: Blood   Result Value Ref Range    25 Hydroxy, Vitamin D 34.9 30.0 - 100.0 ng/ml   CBC (No Diff)    Specimen: Blood   Result Value Ref Range    WBC 11.50 (H) 3.40 - 10.80 10*3/mm3    RBC 4.82 4.14 - 5.80 10*6/mm3    Hemoglobin 16.4 13.0 - 17.7 g/dL    Hematocrit 49.3 37.5 - 51.0 %    .3 (H) 79.0 - 97.0 fL    MCH 34.0 (H) 26.6 - 33.0 pg    MCHC 33.3 31.5 - 35.7 g/dL    RDW 14.1 12.3 - 15.4 %    RDW-SD 53.5 37.0 - 54.0 fl    MPV 11.7 6.0 - 12.0 fL    Platelets 357 140 - 450 10*3/mm3   Vitamin B12    Specimen: Blood   Result Value Ref Range    Vitamin B-12 <150 (L) 211 - 946 pg/mL   Comprehensive Metabolic Panel    Specimen: Blood   Result Value Ref Range    Glucose 97 65 - 99 mg/dL    BUN 9 6 - 20 mg/dL    Creatinine 0.97 0.76 - 1.27 mg/dL    Sodium 137 136 - 145 mmol/L    Potassium 3.7 3.5 - 5.2 mmol/L    Chloride 103 98 - 107 mmol/L    CO2 23.4 22.0 - 29.0 mmol/L    Calcium 9.2 8.6 - 10.5 mg/dL    Total Protein 7.4 6.0 - 8.5 g/dL    Albumin 4.20 3.50 - 5.20 g/dL    ALT (SGPT) 27 1 - 41 U/L    AST (SGOT) 28 1 - 40 U/L    Alkaline Phosphatase 84 39 - 117 U/L    Total Bilirubin 0.7 0.0 - 1.2 mg/dL    Globulin 3.2 gm/dL    A/G Ratio 1.3 g/dL    BUN/Creatinine Ratio 9.3 7.0 - 25.0    Anion Gap 10.6 5.0 - 15.0 mmol/L    eGFR 92.2 >60.0 mL/min/1.73   Results for orders placed or performed during the hospital encounter of 11/30/21   Tissue Pathology Exam    Specimen: A: Large Intestine; Tissue    B: Small Intestine, Ileum; Tissue     C: Large Intestine, Right / Ascending Colon; Tissue    D: Large Intestine, Transverse Colon; Tissue    E: Large Intestine, Left / Descending Colon; Tissue    F: Large Intestine, Sigmoid Colon; Tissue    G: Large Intestine, Rectum; Tissue   Result Value Ref Range    Case Report       Surgical Pathology Report                         Case: [id number]                                  Authorizing Provider:  Dewayne Beavers MD        Collected:           11/30/2021 10:23 AM          Ordering Location:     [facility]   Received:            11/30/2021 02:35 PM                                 [facility]                                                     Pathologist:           Jacek Villegas MD                                                          Specimens:   1) - Large Intestine, anastamosis MM                                                                2) - Small Intestine, Ileum, terminal ileum MM                                                      3) - Large Intestine, Right / Ascending Colon, MM                                                   4) - Large Intestine, Transverse Colon, MM                                                          5) - Large Intestine, Left / Descending Colon, MM                                                    6) - Large Intestine, Sigmoid Colon, MM                                                             7) - Large Intestine, Rectum, MM                                                           Final Diagnosis       SEE SCANNED REPORT       Results for orders placed or performed in visit on 11/04/21   CBC (No Diff)    Specimen: Blood   Result Value Ref Range    WBC 10.63 3.40 - 10.80 10*3/mm3    RBC 4.54 4.14 - 5.80 10*6/mm3    Hemoglobin 15.2 13.0 - 17.7 g/dL    Hematocrit 43.4 37.5 - 51.0 %    MCV 95.6 79.0 - 97.0 fL    MCH 33.5 (H) 26.6 - 33.0 pg    MCHC 35.0 31.5 - 35.7 g/dL    RDW 16.2 (H) 12.3 - 15.4 %    RDW-SD 57.2 (H) 37.0 - 54.0 fl     MPV 10.2 6.0 - 12.0 fL    Platelets 389 140 - 450 10*3/mm3   Comprehensive Metabolic Panel    Specimen: Blood   Result Value Ref Range    Glucose 99 65 - 99 mg/dL    BUN 5 (L) 6 - 20 mg/dL    Creatinine 0.93 0.76 - 1.27 mg/dL    Sodium 139 136 - 145 mmol/L    Potassium 3.7 3.5 - 5.2 mmol/L    Chloride 103 98 - 107 mmol/L    CO2 27.0 22.0 - 29.0 mmol/L    Calcium 9.3 8.6 - 10.5 mg/dL    Total Protein 7.5 6.0 - 8.5 g/dL    Albumin 4.70 3.50 - 5.20 g/dL    ALT (SGPT) 19 1 - 41 U/L    AST (SGOT) 20 1 - 40 U/L    Alkaline Phosphatase 103 39 - 117 U/L    Total Bilirubin 0.5 0.0 - 1.2 mg/dL    eGFR Non African Amer 85 >60 mL/min/1.73    Globulin 2.8 gm/dL    A/G Ratio 1.7 g/dL    BUN/Creatinine Ratio 5.4 (L) 7.0 - 25.0    Anion Gap 9.0 5.0 - 15.0 mmol/L     *Note: Due to a large number of results and/or encounters for the requested time period, some results have not been displayed. A complete set of results can be found in Results Review.

## 2023-07-26 NOTE — ANESTHESIA PREPROCEDURE EVALUATION
Anesthesia Evaluation     Patient summary reviewed and Nursing notes reviewed   NPO Solid Status: > 8 hours  NPO Liquid Status: > 2 hours           Airway   Mallampati: II  TM distance: >3 FB  Possible difficult intubation  Dental    (+) poor dentition    Pulmonary - normal exam   Cardiovascular - negative cardio ROS and normal exam        Neuro/Psych- negative ROS  GI/Hepatic/Renal/Endo    (+) GERD    ROS Comment: crohns  Portal HTN    Musculoskeletal (-) negative ROS    Abdominal  - normal exam   Substance History - negative use     OB/GYN negative ob/gyn ROS         Other                        Anesthesia Plan    ASA 2     general   total IV anesthesia  intravenous induction     Anesthetic plan, risks, benefits, and alternatives have been provided, discussed and informed consent has been obtained with: patient.      CODE STATUS:

## 2023-07-26 NOTE — ANESTHESIA POSTPROCEDURE EVALUATION
Patient: Jose Trent    Procedure Summary       Date: 07/26/23 Room / Location: [facility] ENDOSCOPY 1 / [facility] ENDOSCOPY    Anesthesia Start: 1018 Anesthesia Stop: 1034    Procedure: ESOPHAGOGASTRODUODENOSCOPY possible dilation Diagnosis:       Esophageal dysphagia      Gastroesophageal reflux disease with esophagitis without hemorrhage      (Esophageal dysphagia [R13.19])      (Gastroesophageal reflux disease with esophagitis without hemorrhage [K21.00])    Surgeons: Dewayne Beavers MD Provider: Hali Sanches CRNA    Anesthesia Type: general ASA Status: 2            Anesthesia Type: general    Vitals  No vitals data found for the desired time range.          Post Anesthesia Care and Evaluation    Patient location during evaluation: PHASE II  Patient participation: complete - patient cannot participate  Level of consciousness: sleepy but conscious  Pain score: 0  Pain management: adequate    Airway patency: patent  Anesthetic complications: No anesthetic complications  PONV Status: none  Cardiovascular status: acceptable  Respiratory status: acceptable  Hydration status: acceptable  No anesthesia care post op

## 2023-07-28 LAB — REF LAB TEST METHOD: NORMAL

## 2023-08-07 ENCOUNTER — OFFICE VISIT (OUTPATIENT)
Dept: GASTROENTEROLOGY | Facility: CLINIC | Age: 58
End: 2023-08-07
Payer: MEDICARE

## 2023-08-07 VITALS
SYSTOLIC BLOOD PRESSURE: 120 MMHG | DIASTOLIC BLOOD PRESSURE: 72 MMHG | HEART RATE: 69 BPM | HEIGHT: 72 IN | WEIGHT: 175.6 LBS | BODY MASS INDEX: 23.78 KG/M2

## 2023-08-07 DIAGNOSIS — K22.2 STRICTURE AND STENOSIS OF ESOPHAGUS: Primary | ICD-10-CM

## 2023-08-07 DIAGNOSIS — K21.00 GASTROESOPHAGEAL REFLUX DISEASE WITH ESOPHAGITIS WITHOUT HEMORRHAGE: ICD-10-CM

## 2023-08-07 PROCEDURE — 99213 OFFICE O/P EST LOW 20 MIN: CPT | Performed by: NURSE PRACTITIONER

## 2023-08-07 RX ORDER — SUCRALFATE ORAL 1 G/10ML
1 SUSPENSION ORAL
Qty: 1260 ML | Refills: 2 | Status: SHIPPED | OUTPATIENT
Start: 2023-08-07

## 2023-08-07 NOTE — PROGRESS NOTES
Chief Complaint   Patient presents with    Crohn's Disease    Difficulty Swallowing    Heartburn    endo f/u        Subjective    Jose Trent is a 57 y.o. male. he is here today for follow-up.                                                                  Assessment & Plan                                     1. Stricture and stenosis of esophagus    2. Gastroesophageal reflux disease with esophagitis without hemorrhage      Plan; we will start patient on Carafate before meals and bedtime continue with Prilosec 40 mg/day we will follow-up in 8 weeks to plan repeat EGD follow-up sooner if needed    Follow-up: Return in about 8 weeks (around 10/2/2023) for Recheck.     HPI  57-year-old male presents to discuss EGD results.  Reports dysphagia initially improved well after dilation however this week has felt tighter when swallowing.  Denies any abdominal pain nausea vomiting Crohn's disease well controlled with Humira injection every other week denies any flare of diarrhea blood in stool or melena.  EGD was completed 7/26/2023 noted benign-appearing stenosis dilated to 51 Turkmen grade 4 esophagitis gastritis and normal duodenum.  Esophageal biopsy consistent with chronic esophagitis with focal erosion negative for eosinophils or Da Silva's mucosa.  Antrum biopsy noted chronic gastritis, mild negative for H. pylori.    Review of Systems  Review of Systems   Constitutional:  Negative for activity change, appetite change, chills, diaphoresis, fatigue, fever and unexpected weight change.   HENT:  Positive for trouble swallowing (better but feels like starting to get tight again). Negative for sore throat.    Respiratory:  Negative for shortness of breath.    Gastrointestinal:  Negative for abdominal distention, abdominal pain, anal bleeding, blood in stool, constipation, diarrhea, nausea, rectal  "pain and vomiting.   Musculoskeletal:  Negative for arthralgias.   Skin:  Negative for pallor.   Neurological:  Negative for light-headedness.     /72 (BP Location: Left arm)   Pulse 69   Ht 182.9 cm (72\")   Wt 79.7 kg (175 lb 9.6 oz)   BMI 23.82 kg/mý     Objective      Physical Exam  Constitutional:       General: He is not in acute distress.     Appearance: Normal appearance. He is normal weight. He is not ill-appearing or toxic-appearing.   HENT:      Head: Normocephalic and atraumatic.   Pulmonary:      Effort: Pulmonary effort is normal.   Abdominal:      General: Abdomen is flat. Bowel sounds are normal. There is no distension.      Palpations: Abdomen is soft. There is no mass.      Tenderness: There is no abdominal tenderness.   Neurological:      Mental Status: He is alert.             The following portions of the patient's history were reviewed and updated as appropriate:   Past Medical History:   Diagnosis Date    Backache     of indeterminate etiology    Cobalamin deficiency     Crohn's disease     With ileocolic stricture now resected    Crohn's disease, small intestine     Drug therapy     long term    GERD (gastroesophageal reflux disease)     Partial obstruction of small intestine     Pharyngitis     Portal hypertension     Portal vein thrombosis     And splenic vein    Rhinitis     Tobacco dependence syndrome     encouraged to stop    Upper respiratory infection      Past Surgical History:   Procedure Laterality Date    COLECTOMY PARTIAL / TOTAL      Resection of segment of sigmoid colon, resection of 45 cm of ileum, right colon, with ileotransverse colon anastomosis; resection of segment of bladder, suprapubic cystostomy. 03/28/1984        COLONOSCOPY  07/02/2015    COLONOSCOPY N/A 2/22/2019    Procedure: COLONOSCOPY;  Surgeon: Dewayne Beavers MD;  Location: [facility] ENDOSCOPY;  Service: Gastroenterology    COLONOSCOPY N/A 9/23/2020    Procedure: COLONOSCOPY WED;  Surgeon: Dewayne Beavers " MD JAYLENE;  Location: [facility] ENDOSCOPY;  Service: Gastroenterology;  Laterality: N/A;    COLONOSCOPY N/A 11/30/2021    Procedure: COLONOSCOPY;  Surgeon: Dewayne Beavers MD;  Location: [facility] ENDOSCOPY;  Service: Gastroenterology;  Laterality: N/A;    COLONOSCOPY N/A 11/28/2022    Procedure: COLONOSCOPY;  Surgeon: Dewayne Beavers MD;  Location: [facility] ENDOSCOPY;  Service: Gastroenterology;  Laterality: N/A;    ENDOSCOPY      Prior resect of cecum & ascend colon found.Muoosa beyond strict has a normal appear.Stenosis of anastomosis, will not allow pass of scope.Linear ulcerat of anastomosis.Narrow strict & not long strict as reported by xray. Dilatation performed. 6/26/2014        ENDOSCOPY      Normal hypopharynx, esophagus, dudoenum, symmetrical & patent pylorus. Hiatus hernia in GE junction. Mild non-erosive gastritis in antrum. Biopsy taken. 01/31/2013        EXPLORATORY LAPAROTOMY      Exploratory laparotomy with adhesiolysis.Takedown of previous anastomosis.Resection of terminal ileum and proximal transverse colon w/a stapled CODY anastomosis between ileum and transverse colon. 03/07/2016        SPLENECTOMY       Splenomegaly and hypersplenism 11/02/2007     UPPER GASTROINTESTINAL ENDOSCOPY  01/31/2013     Family History   Problem Relation Age of Onset    Cancer Other        No Known Allergies  Social History     Socioeconomic History    Marital status:    Tobacco Use    Smoking status: Every Day     Packs/day: 1.00     Types: Cigarettes    Smokeless tobacco: Never   Vaping Use    Vaping Use: Never used   Substance and Sexual Activity    Alcohol use: Never    Drug use: Never    Sexual activity: Defer     Comment:      Current Medications:  Prior to Admission medications    Medication Sig Start Date End Date Taking? Authorizing Provider   adalimumab (Humira) 40 MG/0.8ML Prefilled Syringe Kit injection Inject 0.8 mL under the skin into the appropriate area as directed Every 14 (Fourteen) Days. 11/11/22   Yes Torrie Paul APRN   aspirin 81 MG EC tablet Take 1 tablet by mouth Daily.   Yes Provider, MD Chandrika   Azelastine HCl 137 MCG/SPRAY solution  23  Yes ProviderChandrika MD   EPINEPHrine (EPIPEN) 0.3 MG/0.3ML solution auto-injector injection  22  Yes ProviderChandrika MD   hydrOXYzine (ATARAX) 10 MG tablet Take 1 tablet by mouth At Night As Needed. 21  Yes Chandrika Bocanegra MD   omeprazole (priLOSEC) 20 MG capsule TAKE 1 CAPSULE TWICE DAILY 23  Yes Torrie Paul APRN     Orders placed during this encounter include:  No orders of the defined types were placed in this encounter.    * Surgery not found *  New Medications Ordered This Visit   Medications    sucralfate (Carafate) 1 GM/10ML suspension     Sig: Take 10 mL by mouth 4 (Four) Times a Day With Meals & at Bedtime.     Dispense:  1260 mL     Refill:  2         Review and/or summary of lab tests, radiology, procedures, medications. Review and summary of old records and obtaining of history. The risks and benefits of my recommendations, as well as other treatment options were discussed . Any questions/concerned were answered. Patient voiced understanding and agreement.          This document has been electronically signed by VEENA Cantu on 2023 10:56 CDT                                               Results for orders placed or performed during the hospital encounter of 23   TISSUE EXAM, [facility] ([facility])    Specimen: A: Esophagus, Distal; Tissue    B: Gastric, Antrum; Tissue   Result Value Ref Range    Reference Lab Report       [facility]  [address] [place] [address]  Phone: [phone] or [phone]  Fax: [phone]  Jacek Villegas M.D., Medical Director    PATIENT NAME                           LABORATORY NO.  JOSEMANUEL BURCIAGA.          [id number]  [id number]                         AGE              SEX  SSN           CLIENT REF  "#  [facility]           57      1965  M    [id number]   [id number]    [place]                       REQUESTING M.D.     ATTENDING M.D.     COPY TO.  [address] [place] [address]                 [place] [address]             DATE COLLECTED      DATE RECEIVED      DATE REPORTED  07/26/2023 07/26/2023 07/28/2023    DIAGNOSIS:  A.   ESOPHAGUS, BIOPSY, DISTAL:  Chronic esophagitis with focal erosion  Negative for eosinophils  Negative for specialized Da Silva's mucosa  B.   ANTRUM, BIOPSY:  Chronic gastritis, mild  Negative for Helicobacter pylori    JBS/pah    COMMENT:   I ordered H. pylori immunohistochemical (IHC) stain on block  B1 because a typical inflammatory infiltrate was present, and organisms were  not seen on H&E staining.  H. pylori IHC stain is negative for Helicobacter  pylori.  Control tissue stains as expected.    CLINICAL HISTORY:  Esophageal dysphagia, Gastroesophageal reflux disease with esophagitis without  hemorrhage    SPECIMENS RECEIVED:  A.  ESOPHAGUS, BIOPSY, DISTAL  B.  ANTRUM, BIOPSY    MICROSCOPIC DESCRIPTION:  Tissue blocks are prepared and slides are examined microscopically on all  specimens. See diagnosis for details.    The internal and external (both positive and negative) controls reacted  appropriately. Some of our immunohistochemical and in situ hybridization  studies are performed as analyte specific reagents. The following statement  applies to those tests: This test was developed, and its performance  characteristics determined by [facility]. It has not been  cleared or approved by the [place] Food  and Drug Administration. However, the  FDA has determined that approval and clearance are not necessary.    Professional interpretation rendered by Otoniel Yang M.D. at [place],  [facility], [address] [place] [address].    GROSS DESCRIPTION:  A.  Labeled \"distal esophagus cold biopsy\".  Consists of 1 piece " "of tan soft  tissue measuring 0.3 x 0.2 x 0.2 cm and is submitted entirely in 1 block.  ROXIE  B.  Labeled \"antrum cold biopsy\".  Consists of 2 pieces of tan soft tissue  measuring 0.4 x 0.3 x 0.2 cm in aggregate and is submitted entirely in 1  block.    REVIEWED, DIAGNOSED AND ELECTRONICALLY  SIGNED BY:    Otoniel Yang M.D.  CPT CODES:  88305x2, 27211     Results for orders placed or performed in visit on 22   KOH Prep - Nail, Hand, Digit Left    Specimen: Hand, Digit Left; Nail   Result Value Ref Range    KOH Prep No yeast or hyphal elements seen No yeast or hyphal elements seen   Fungus Culture    Specimen: Hand, Digit Left; Nail   Result Value Ref Range    Fungus Culture No fungus isolated at 4 weeks    Results for orders placed or performed during the hospital encounter of 22   TISSUE EXAM, [facility] ([facility])    Specimen: A: Large Intestine; Tissue    B: Small Intestine, Ileum; Tissue   Result Value Ref Range    Reference Lab Report       [facility]  [address] [place] [address]  Phone: [phone] or [phone]  Fax: [phone]  Jacek Villegas M.D., Medical Director    PATIENT NAME                                     LABORATORY NO.  JOSEMANUEL BURCIAGA.                    [id number]  [id number]                                 AGE                    SEX   N              CLIENT REF #  [facility]                   57        1965      GENNA     [id number]      [id number]    [place]                               REQUESTING M.D.           [place]END[place] [address]                     DATE COLLECTED            DATE RECEIVED          DATE REPORTED  2022    DIAGNOSIS:  A.     COLON, BIOPSY:  Colonic mucosa with no significant histopathologic abnormality  No evidence of " "dysplasia, carcinoma, or  microscopic colitis    B.     TERMINAL ILEUM BIOPSY:  Ileal mucosa with no significant histopathologic abnormality  Negative for dysplasia or carcinoma    CLINICAL HISTORY:  Crohn's disease of small intestine without complication    SPECIMENS RECEIVED:  A.    COLON, BIOPSY  B.    TERMINAL ILEUM BIOPSY    MICROSCOPIC DESCRIPTION:  Tissue blocks are prepared and slides are examined microscopically on all  specimens. See diagnosis for details.    Professional interpretation rendered by Asim Ronquillo M.D.,TRENA at [employer], [employer] [facility] [address] [place] [address].    GROSS DESCRIPTION:  A.    Specimen is received in 1 formalin filled container labeled \"colon mucosa  cold biopsy\" and consists of 3 portions of tan soft tissue measuring 0.5 x  0.5 x 0.1 cm.  The specimen is submitted entirely in 1 cassette.  BW  B.    Specimen is received in 1 formalin filled container labeled \"terminal ileum  cold biopsy\" and consists of 3 portions of tan soft tissue measuring 0.5 x  0.4 x 0.2 cm.   The smallest portions may not survive processing and the  specimen is submitted entirely in 1 cassette.    REVIEWED, DIAGNOSED AND ELECTRONICALLY  SIGNED BY:    Asim Ronquillo M.D.,F.C.A.P.  CPT CODES:  88305x2     Results for orders placed or performed in visit on 11/07/22   Hepatitis C Antibody    Specimen: Blood   Result Value Ref Range    Hepatitis C Ab Non-Reactive Non-Reactive   Hepatitis B Surface Antigen    Specimen: Blood   Result Value Ref Range    Hepatitis B Surface Ag Non-Reactive Non-Reactive   CBC (No Diff)    Specimen: Blood   Result Value Ref Range    WBC 11.35 (H) 3.40 - 10.80 10*3/mm3    RBC 4.88 4.14 - 5.80 10*6/mm3    Hemoglobin 16.1 13.0 - 17.7 g/dL    Hematocrit 48.1 37.5 - 51.0 %    MCV 98.6 (H) 79.0 - 97.0 fL    MCH 33.0 26.6 - 33.0 pg    MCHC 33.5 31.5 - 35.7 g/dL    RDW 14.0 12.3 - 15.4 %    RDW-SD 50.9 37.0 - 54.0 fl    MPV 11.1 6.0 - 12.0 fL    Platelets 434 140 - 450 10*3/mm3 "   Vitamin B12    Specimen: Blood   Result Value Ref Range    Vitamin B-12 <150 (L) 211 - 946 pg/mL   Comprehensive Metabolic Panel    Specimen: Blood   Result Value Ref Range    Glucose 90 65 - 99 mg/dL    BUN 8 6 - 20 mg/dL    Creatinine 0.92 0.76 - 1.27 mg/dL    Sodium 138 136 - 145 mmol/L    Potassium 3.8 3.5 - 5.2 mmol/L    Chloride 105 98 - 107 mmol/L    CO2 26.0 22.0 - 29.0 mmol/L    Calcium 9.5 8.6 - 10.5 mg/dL    Total Protein 7.5 6.0 - 8.5 g/dL    Albumin 4.40 3.50 - 5.20 g/dL    ALT (SGPT) 16 1 - 41 U/L    AST (SGOT) 23 1 - 40 U/L    Alkaline Phosphatase 101 39 - 117 U/L    Total Bilirubin 0.5 0.0 - 1.2 mg/dL    Globulin 3.1 gm/dL    A/G Ratio 1.4 g/dL    BUN/Creatinine Ratio 8.7 7.0 - 25.0    Anion Gap 7.0 5.0 - 15.0 mmol/L    eGFR 98.2 >60.0 mL/min/1.73   Results for orders placed or performed in visit on 05/04/22   QuantiFERON TB Plus Client Incubated    Specimen: Blood   Result Value Ref Range    QuantiFERON Criteria Comment     QUANTIFERON TB1 AG VALUE 0.04 IU/mL    QUANTIFERON TB2 AG VALUE 0.05 IU/mL    QuantiFERON Nil Value 0.05 IU/mL    QuantiFERON Mitogen Value >10.00 IU/mL    QUANTIFERON-TB GOLD PLUS Negative Negative   Vitamin D 25 Hydroxy    Specimen: Blood   Result Value Ref Range    25 Hydroxy, Vitamin D 34.9 30.0 - 100.0 ng/ml   CBC (No Diff)    Specimen: Blood   Result Value Ref Range    WBC 11.50 (H) 3.40 - 10.80 10*3/mm3    RBC 4.82 4.14 - 5.80 10*6/mm3    Hemoglobin 16.4 13.0 - 17.7 g/dL    Hematocrit 49.3 37.5 - 51.0 %    .3 (H) 79.0 - 97.0 fL    MCH 34.0 (H) 26.6 - 33.0 pg    MCHC 33.3 31.5 - 35.7 g/dL    RDW 14.1 12.3 - 15.4 %    RDW-SD 53.5 37.0 - 54.0 fl    MPV 11.7 6.0 - 12.0 fL    Platelets 357 140 - 450 10*3/mm3   Vitamin B12    Specimen: Blood   Result Value Ref Range    Vitamin B-12 <150 (L) 211 - 946 pg/mL   Comprehensive Metabolic Panel    Specimen: Blood   Result Value Ref Range    Glucose 97 65 - 99 mg/dL    BUN 9 6 - 20 mg/dL    Creatinine 0.97 0.76 - 1.27 mg/dL     Sodium 137 136 - 145 mmol/L    Potassium 3.7 3.5 - 5.2 mmol/L    Chloride 103 98 - 107 mmol/L    CO2 23.4 22.0 - 29.0 mmol/L    Calcium 9.2 8.6 - 10.5 mg/dL    Total Protein 7.4 6.0 - 8.5 g/dL    Albumin 4.20 3.50 - 5.20 g/dL    ALT (SGPT) 27 1 - 41 U/L    AST (SGOT) 28 1 - 40 U/L    Alkaline Phosphatase 84 39 - 117 U/L    Total Bilirubin 0.7 0.0 - 1.2 mg/dL    Globulin 3.2 gm/dL    A/G Ratio 1.3 g/dL    BUN/Creatinine Ratio 9.3 7.0 - 25.0    Anion Gap 10.6 5.0 - 15.0 mmol/L    eGFR 92.2 >60.0 mL/min/1.73   Results for orders placed or performed during the hospital encounter of 11/30/21   Tissue Pathology Exam    Specimen: A: Large Intestine; Tissue    B: Small Intestine, Ileum; Tissue    C: Large Intestine, Right / Ascending Colon; Tissue    D: Large Intestine, Transverse Colon; Tissue    E: Large Intestine, Left / Descending Colon; Tissue    F: Large Intestine, Sigmoid Colon; Tissue    G: Large Intestine, Rectum; Tissue   Result Value Ref Range    Case Report       Surgical Pathology Report                         Case: [id number]                                  Authorizing Provider:  Dewayne Beavers MD        Collected:           11/30/2021 10:23 AM          Ordering Location:     [facility]   Received:            11/30/2021 02:35 PM                                 [place] [facility]                                                     Pathologist:           Jacek Villegas MD                                                          Specimens:   1) - Large Intestine, anastamosis MM                                                                2) - Small Intestine, Ileum, terminal ileum MM                                                      3) - Large Intestine, Right / Ascending Colon, MM                                                   4) - Large Intestine, Transverse Colon, MM                                                          5) - Large Intestine, Left / Descending Colon, MM                                                     6) - Large Intestine, Sigmoid Colon, MM                                                             7) - Large Intestine, Rectum, MM                                                           Final Diagnosis       SEE SCANNED REPORT       Results for orders placed or performed in visit on 11/04/21   CBC (No Diff)    Specimen: Blood   Result Value Ref Range    WBC 10.63 3.40 - 10.80 10*3/mm3    RBC 4.54 4.14 - 5.80 10*6/mm3    Hemoglobin 15.2 13.0 - 17.7 g/dL    Hematocrit 43.4 37.5 - 51.0 %    MCV 95.6 79.0 - 97.0 fL    MCH 33.5 (H) 26.6 - 33.0 pg    MCHC 35.0 31.5 - 35.7 g/dL    RDW 16.2 (H) 12.3 - 15.4 %    RDW-SD 57.2 (H) 37.0 - 54.0 fl    MPV 10.2 6.0 - 12.0 fL    Platelets 389 140 - 450 10*3/mm3   Comprehensive Metabolic Panel    Specimen: Blood   Result Value Ref Range    Glucose 99 65 - 99 mg/dL    BUN 5 (L) 6 - 20 mg/dL    Creatinine 0.93 0.76 - 1.27 mg/dL    Sodium 139 136 - 145 mmol/L    Potassium 3.7 3.5 - 5.2 mmol/L    Chloride 103 98 - 107 mmol/L    CO2 27.0 22.0 - 29.0 mmol/L    Calcium 9.3 8.6 - 10.5 mg/dL    Total Protein 7.5 6.0 - 8.5 g/dL    Albumin 4.70 3.50 - 5.20 g/dL    ALT (SGPT) 19 1 - 41 U/L    AST (SGOT) 20 1 - 40 U/L    Alkaline Phosphatase 103 39 - 117 U/L    Total Bilirubin 0.5 0.0 - 1.2 mg/dL    eGFR Non African Amer 85 >60 mL/min/1.73    Globulin 2.8 gm/dL    A/G Ratio 1.7 g/dL    BUN/Creatinine Ratio 5.4 (L) 7.0 - 25.0    Anion Gap 9.0 5.0 - 15.0 mmol/L     *Note: Due to a large number of results and/or encounters for the requested time period, some results have not been displayed. A complete set of results can be found in Results Review.

## 2024-08-23 NOTE — TELEPHONE ENCOUNTER

## (undated) DEVICE — TRAP SXN POLYP QUICKCATCH LF

## (undated) DEVICE — SINGLE-USE BIOPSY FORCEPS: Brand: RADIAL JAW 4

## (undated) DEVICE — Device: Brand: DISPOSABLE ELECTROSURGICAL SNARE

## (undated) DEVICE — CANN SMPL SOFTECH BIFLO ETCO2 A/M 7FT